# Patient Record
Sex: FEMALE | Race: WHITE | NOT HISPANIC OR LATINO | Employment: OTHER | URBAN - METROPOLITAN AREA
[De-identification: names, ages, dates, MRNs, and addresses within clinical notes are randomized per-mention and may not be internally consistent; named-entity substitution may affect disease eponyms.]

---

## 2017-07-06 ENCOUNTER — TRANSCRIBE ORDERS (OUTPATIENT)
Dept: ADMINISTRATIVE | Facility: HOSPITAL | Age: 62
End: 2017-07-06

## 2017-07-06 DIAGNOSIS — Z12.31 VISIT FOR SCREENING MAMMOGRAM: Primary | ICD-10-CM

## 2017-07-14 ENCOUNTER — APPOINTMENT (OUTPATIENT)
Dept: MAMMOGRAPHY | Facility: HOSPITAL | Age: 62
End: 2017-07-14
Attending: FAMILY MEDICINE

## 2017-10-10 ENCOUNTER — HOSPITAL ENCOUNTER (OUTPATIENT)
Dept: MAMMOGRAPHY | Facility: HOSPITAL | Age: 62
Discharge: HOME | End: 2017-10-10
Attending: FAMILY MEDICINE | Admitting: FAMILY MEDICINE

## 2017-10-10 DIAGNOSIS — Z12.31 VISIT FOR SCREENING MAMMOGRAM: ICD-10-CM

## 2017-10-10 PROCEDURE — G0202 SCR MAMMO BI INCL CAD: HCPCS

## 2017-10-10 PROCEDURE — 77063 BREAST TOMOSYNTHESIS BI: CPT

## 2018-01-22 ENCOUNTER — TRANSCRIBE ORDERS (OUTPATIENT)
Dept: ADMINISTRATIVE | Facility: HOSPITAL | Age: 63
End: 2018-01-22

## 2018-01-22 ENCOUNTER — LAB (OUTPATIENT)
Dept: LAB | Facility: HOSPITAL | Age: 63
End: 2018-01-22

## 2018-01-22 DIAGNOSIS — Z79.899 ENCOUNTER FOR LONG-TERM (CURRENT) USE OF MEDICATIONS: Primary | ICD-10-CM

## 2018-01-22 DIAGNOSIS — L40.0 PSORIASIS VULGARIS: ICD-10-CM

## 2018-01-22 DIAGNOSIS — Z79.899 ENCOUNTER FOR LONG-TERM (CURRENT) USE OF MEDICATIONS: ICD-10-CM

## 2018-01-22 LAB
ALBUMIN SERPL-MCNC: 4.4 G/DL (ref 3.5–5.2)
ALBUMIN/GLOB SERPL: 1.7 G/DL
ALP SERPL-CCNC: 121 U/L (ref 40–129)
ALT SERPL W P-5'-P-CCNC: 19 U/L (ref 5–33)
ANION GAP SERPL CALCULATED.3IONS-SCNC: 14.1 MMOL/L
AST SERPL-CCNC: 17 U/L (ref 5–32)
BASOPHILS # BLD AUTO: 0.07 10*3/MM3 (ref 0–0.2)
BASOPHILS NFR BLD AUTO: 0.8 % (ref 0–2)
BILIRUB SERPL-MCNC: 0.4 MG/DL (ref 0.2–1.2)
BUN BLD-MCNC: 17 MG/DL (ref 8–23)
BUN/CREAT SERPL: 15.2 (ref 7–25)
CALCIUM SPEC-SCNC: 9.4 MG/DL (ref 8.8–10.5)
CHLORIDE SERPL-SCNC: 101 MMOL/L (ref 98–107)
CO2 SERPL-SCNC: 24.9 MMOL/L (ref 22–29)
CREAT BLD-MCNC: 1.12 MG/DL (ref 0.57–1)
DEPRECATED RDW RBC AUTO: 41.6 FL (ref 37–54)
EOSINOPHIL # BLD AUTO: 0.18 10*3/MM3 (ref 0.1–0.3)
EOSINOPHIL NFR BLD AUTO: 2 % (ref 0–4)
ERYTHROCYTE [DISTWIDTH] IN BLOOD BY AUTOMATED COUNT: 13 % (ref 11.5–14.5)
GFR SERPL CREATININE-BSD FRML MDRD: 49 ML/MIN/1.73
GLOBULIN UR ELPH-MCNC: 2.6 GM/DL
GLUCOSE BLD-MCNC: 208 MG/DL (ref 65–99)
HCT VFR BLD AUTO: 38.1 % (ref 37–47)
HGB BLD-MCNC: 12.8 G/DL (ref 12–16)
IMM GRANULOCYTES # BLD: 0.05 10*3/MM3 (ref 0–0.03)
IMM GRANULOCYTES NFR BLD: 0.6 % (ref 0–0.5)
LYMPHOCYTES # BLD AUTO: 2.49 10*3/MM3 (ref 0.6–4.8)
LYMPHOCYTES NFR BLD AUTO: 28.3 % (ref 20–45)
MCH RBC QN AUTO: 29.8 PG (ref 27–31)
MCHC RBC AUTO-ENTMCNC: 33.6 G/DL (ref 31–37)
MCV RBC AUTO: 88.6 FL (ref 81–99)
MONOCYTES # BLD AUTO: 0.7 10*3/MM3 (ref 0–1)
MONOCYTES NFR BLD AUTO: 8 % (ref 3–8)
NEUTROPHILS # BLD AUTO: 5.31 10*3/MM3 (ref 1.5–8.3)
NEUTROPHILS NFR BLD AUTO: 60.3 % (ref 45–70)
NRBC BLD MANUAL-RTO: 0 /100 WBC (ref 0–0)
PLATELET # BLD AUTO: 340 10*3/MM3 (ref 140–500)
PMV BLD AUTO: 10.2 FL (ref 7.4–10.4)
POTASSIUM BLD-SCNC: 4.3 MMOL/L (ref 3.5–5.2)
PROT SERPL-MCNC: 7 G/DL (ref 6–8.5)
RBC # BLD AUTO: 4.3 10*6/MM3 (ref 4.2–5.4)
SODIUM BLD-SCNC: 140 MMOL/L (ref 136–145)
WBC NRBC COR # BLD: 8.8 10*3/MM3 (ref 4.8–10.8)

## 2018-01-22 PROCEDURE — 80074 ACUTE HEPATITIS PANEL: CPT

## 2018-01-22 PROCEDURE — 86480 TB TEST CELL IMMUN MEASURE: CPT

## 2018-01-22 PROCEDURE — 85025 COMPLETE CBC W/AUTO DIFF WBC: CPT

## 2018-01-22 PROCEDURE — 36415 COLL VENOUS BLD VENIPUNCTURE: CPT

## 2018-01-22 PROCEDURE — 80053 COMPREHEN METABOLIC PANEL: CPT

## 2018-01-23 LAB
HAV IGM SERPL QL IA: NORMAL
HBV CORE IGM SERPL QL IA: NORMAL
HBV SURFACE AG SERPL QL IA: NORMAL
HCV AB SER DONR QL: NORMAL

## 2018-01-24 LAB
INTERPRETATION: NORMAL
M TB TUBERC IFN-G BLD QL: NEGATIVE
QFT TB AG MINUS NIL VALUE: <0 IU/ML
QUANTIFERON CRITERIA: NORMAL
QUANTIFERON INCUBATION: NORMAL
QUANTIFERON MITOGEN VALUE: >10 IU/ML
QUANTIFERON NIL VALUE: 0.06 IU/ML
QUANTIFERON TB AG VALUE: 0.04 IU/ML

## 2018-03-05 ENCOUNTER — TRANSCRIBE ORDERS (OUTPATIENT)
Dept: ADMINISTRATIVE | Facility: HOSPITAL | Age: 63
End: 2018-03-05

## 2018-03-05 DIAGNOSIS — K21.9 CHRONIC GERD: Primary | ICD-10-CM

## 2018-04-12 ENCOUNTER — APPOINTMENT (OUTPATIENT)
Dept: GENERAL RADIOLOGY | Facility: HOSPITAL | Age: 63
End: 2018-04-12
Attending: FAMILY MEDICINE

## 2018-07-09 ENCOUNTER — TRANSCRIBE ORDERS (OUTPATIENT)
Dept: ADMINISTRATIVE | Facility: HOSPITAL | Age: 63
End: 2018-07-09

## 2018-07-09 DIAGNOSIS — D86.9 SARCOIDOSIS: Primary | ICD-10-CM

## 2018-07-16 ENCOUNTER — HOSPITAL ENCOUNTER (OUTPATIENT)
Dept: CT IMAGING | Facility: HOSPITAL | Age: 63
Discharge: HOME | End: 2018-07-16
Attending: FAMILY MEDICINE | Admitting: FAMILY MEDICINE

## 2018-07-16 DIAGNOSIS — D86.9 SARCOIDOSIS: ICD-10-CM

## 2018-07-16 PROCEDURE — 71260 CT THORAX DX C+: CPT

## 2018-07-16 PROCEDURE — 0 IOPAMIDOL PER 1 ML: Performed by: FAMILY MEDICINE

## 2018-07-16 RX ADMIN — IOPAMIDOL 100 ML: 755 INJECTION, SOLUTION INTRAVENOUS at 13:30

## 2018-12-26 ENCOUNTER — TRANSCRIBE ORDERS (OUTPATIENT)
Dept: ADMINISTRATIVE | Facility: HOSPITAL | Age: 63
End: 2018-12-26

## 2018-12-26 DIAGNOSIS — Z12.39 SCREENING BREAST EXAMINATION: Primary | ICD-10-CM

## 2018-12-31 ENCOUNTER — LAB (OUTPATIENT)
Dept: LAB | Facility: HOSPITAL | Age: 63
End: 2018-12-31

## 2018-12-31 ENCOUNTER — TRANSCRIBE ORDERS (OUTPATIENT)
Dept: ADMINISTRATIVE | Facility: HOSPITAL | Age: 63
End: 2018-12-31

## 2018-12-31 DIAGNOSIS — Z79.899 HIGH RISK MEDICATION USE: ICD-10-CM

## 2018-12-31 DIAGNOSIS — L40.0 PSORIASIS VULGARIS: Primary | ICD-10-CM

## 2018-12-31 DIAGNOSIS — L40.0 PSORIASIS VULGARIS: ICD-10-CM

## 2018-12-31 LAB
ALBUMIN SERPL-MCNC: 4.6 G/DL (ref 3.5–5.2)
ALBUMIN/GLOB SERPL: 1.4 G/DL
ALP SERPL-CCNC: 128 U/L (ref 40–129)
ALT SERPL W P-5'-P-CCNC: 38 U/L (ref 5–33)
ANION GAP SERPL CALCULATED.3IONS-SCNC: 13.2 MMOL/L
AST SERPL-CCNC: 26 U/L (ref 5–32)
BASOPHILS # BLD AUTO: 0.07 10*3/MM3 (ref 0–0.2)
BASOPHILS NFR BLD AUTO: 0.9 % (ref 0–2)
BILIRUB SERPL-MCNC: 0.5 MG/DL (ref 0.2–1.2)
BUN BLD-MCNC: 28 MG/DL (ref 8–23)
BUN/CREAT SERPL: 24.6 (ref 7–25)
CALCIUM SPEC-SCNC: 10 MG/DL (ref 8.8–10.5)
CHLORIDE SERPL-SCNC: 99 MMOL/L (ref 98–107)
CO2 SERPL-SCNC: 25.8 MMOL/L (ref 22–29)
CREAT BLD-MCNC: 1.14 MG/DL (ref 0.57–1)
DEPRECATED RDW RBC AUTO: 43 FL (ref 37–54)
EOSINOPHIL # BLD AUTO: 0.16 10*3/MM3 (ref 0.1–0.3)
EOSINOPHIL NFR BLD AUTO: 2.2 % (ref 0–4)
ERYTHROCYTE [DISTWIDTH] IN BLOOD BY AUTOMATED COUNT: 13.2 % (ref 11.5–14.5)
GFR SERPL CREATININE-BSD FRML MDRD: 48 ML/MIN/1.73
GLOBULIN UR ELPH-MCNC: 3.4 GM/DL
GLUCOSE BLD-MCNC: 211 MG/DL (ref 65–99)
HCT VFR BLD AUTO: 41 % (ref 37–47)
HGB BLD-MCNC: 13.5 G/DL (ref 12–16)
IMM GRANULOCYTES # BLD AUTO: 0.07 10*3/MM3 (ref 0–0.03)
IMM GRANULOCYTES NFR BLD AUTO: 0.9 % (ref 0–0.5)
LYMPHOCYTES # BLD AUTO: 2.98 10*3/MM3 (ref 0.6–4.8)
LYMPHOCYTES NFR BLD AUTO: 40.2 % (ref 20–45)
MCH RBC QN AUTO: 29.5 PG (ref 27–31)
MCHC RBC AUTO-ENTMCNC: 32.9 G/DL (ref 31–37)
MCV RBC AUTO: 89.7 FL (ref 81–99)
MONOCYTES # BLD AUTO: 0.57 10*3/MM3 (ref 0–1)
MONOCYTES NFR BLD AUTO: 7.7 % (ref 3–8)
NEUTROPHILS # BLD AUTO: 3.57 10*3/MM3 (ref 1.5–8.3)
NEUTROPHILS NFR BLD AUTO: 48.1 % (ref 45–70)
NRBC BLD AUTO-RTO: 0 /100 WBC (ref 0–0)
PLATELET # BLD AUTO: 275 10*3/MM3 (ref 140–500)
PMV BLD AUTO: 10.2 FL (ref 7.4–10.4)
POTASSIUM BLD-SCNC: 4.3 MMOL/L (ref 3.5–5.2)
PROT SERPL-MCNC: 8 G/DL (ref 6–8.5)
RBC # BLD AUTO: 4.57 10*6/MM3 (ref 4.2–5.4)
SODIUM BLD-SCNC: 138 MMOL/L (ref 136–145)
WBC NRBC COR # BLD: 7.42 10*3/MM3 (ref 4.8–10.8)

## 2018-12-31 PROCEDURE — 36415 COLL VENOUS BLD VENIPUNCTURE: CPT

## 2018-12-31 PROCEDURE — 80053 COMPREHEN METABOLIC PANEL: CPT

## 2018-12-31 PROCEDURE — 85025 COMPLETE CBC W/AUTO DIFF WBC: CPT

## 2018-12-31 PROCEDURE — 86480 TB TEST CELL IMMUN MEASURE: CPT

## 2019-01-03 LAB
QUANTIFERON CRITERIA: NORMAL
QUANTIFERON INCUBATION: NORMAL
QUANTIFERON MITOGEN VALUE: >10 IU/ML
QUANTIFERON NIL VALUE: 0.02 IU/ML
QUANTIFERON TB1 AG VALUE: 0.05 IU/ML
QUANTIFERON TB2 AG VALUE: 0.04 IU/ML
QUANTIFERON-TB GOLD PLUS: NEGATIVE

## 2019-01-16 ENCOUNTER — HOSPITAL ENCOUNTER (OUTPATIENT)
Dept: MAMMOGRAPHY | Facility: HOSPITAL | Age: 64
Discharge: HOME | End: 2019-01-16
Attending: FAMILY MEDICINE | Admitting: FAMILY MEDICINE

## 2019-01-16 DIAGNOSIS — Z12.39 SCREENING BREAST EXAMINATION: ICD-10-CM

## 2019-01-16 PROCEDURE — 77067 SCR MAMMO BI INCL CAD: CPT

## 2019-01-16 PROCEDURE — 77063 BREAST TOMOSYNTHESIS BI: CPT

## 2019-04-11 ENCOUNTER — TRANSCRIBE ORDERS (OUTPATIENT)
Dept: ADMINISTRATIVE | Facility: HOSPITAL | Age: 64
End: 2019-04-11

## 2019-04-11 DIAGNOSIS — Z78.0 MENOPAUSE: Primary | ICD-10-CM

## 2019-06-17 ENCOUNTER — LAB (OUTPATIENT)
Dept: LAB | Facility: HOSPITAL | Age: 64
End: 2019-06-17

## 2019-06-17 ENCOUNTER — TRANSCRIBE ORDERS (OUTPATIENT)
Dept: ADMINISTRATIVE | Facility: HOSPITAL | Age: 64
End: 2019-06-17

## 2019-06-17 DIAGNOSIS — Z79.899 ENCOUNTER FOR LONG-TERM (CURRENT) USE OF MEDICATIONS: ICD-10-CM

## 2019-06-17 DIAGNOSIS — L40.0 PSORIASIS VULGARIS: ICD-10-CM

## 2019-06-17 DIAGNOSIS — Z79.899 ENCOUNTER FOR LONG-TERM (CURRENT) USE OF MEDICATIONS: Primary | ICD-10-CM

## 2019-06-17 LAB
ALBUMIN SERPL-MCNC: 4.7 G/DL (ref 3.5–5.2)
ALBUMIN/GLOB SERPL: 1.6 G/DL
ALP SERPL-CCNC: 95 U/L (ref 39–117)
ALT SERPL W P-5'-P-CCNC: 34 U/L (ref 1–33)
ANION GAP SERPL CALCULATED.3IONS-SCNC: 12.2 MMOL/L
AST SERPL-CCNC: 25 U/L (ref 1–32)
BASOPHILS # BLD AUTO: 0.08 10*3/MM3 (ref 0–0.2)
BASOPHILS NFR BLD AUTO: 1 % (ref 0–1.5)
BILIRUB SERPL-MCNC: 0.5 MG/DL (ref 0.2–1.2)
BUN BLD-MCNC: 28 MG/DL (ref 8–23)
BUN/CREAT SERPL: 25.2 (ref 7–25)
CALCIUM SPEC-SCNC: 10.2 MG/DL (ref 8.6–10.5)
CHLORIDE SERPL-SCNC: 97 MMOL/L (ref 98–107)
CO2 SERPL-SCNC: 27.8 MMOL/L (ref 22–29)
CREAT BLD-MCNC: 1.11 MG/DL (ref 0.57–1)
DEPRECATED RDW RBC AUTO: 43.2 FL (ref 37–54)
EOSINOPHIL # BLD AUTO: 0.14 10*3/MM3 (ref 0–0.4)
EOSINOPHIL NFR BLD AUTO: 1.7 % (ref 0.3–6.2)
ERYTHROCYTE [DISTWIDTH] IN BLOOD BY AUTOMATED COUNT: 13.2 % (ref 12.3–15.4)
GFR SERPL CREATININE-BSD FRML MDRD: 49 ML/MIN/1.73
GLOBULIN UR ELPH-MCNC: 2.9 GM/DL
GLUCOSE BLD-MCNC: 105 MG/DL (ref 65–99)
HCT VFR BLD AUTO: 40.2 % (ref 34–46.6)
HGB BLD-MCNC: 13.2 G/DL (ref 12–15.9)
IMM GRANULOCYTES # BLD AUTO: 0.07 10*3/MM3 (ref 0–0.05)
IMM GRANULOCYTES NFR BLD AUTO: 0.8 % (ref 0–0.5)
LYMPHOCYTES # BLD AUTO: 3.38 10*3/MM3 (ref 0.7–3.1)
LYMPHOCYTES NFR BLD AUTO: 40.4 % (ref 19.6–45.3)
MCH RBC QN AUTO: 29.9 PG (ref 26.6–33)
MCHC RBC AUTO-ENTMCNC: 32.8 G/DL (ref 31.5–35.7)
MCV RBC AUTO: 91.2 FL (ref 79–97)
MONOCYTES # BLD AUTO: 0.61 10*3/MM3 (ref 0.1–0.9)
MONOCYTES NFR BLD AUTO: 7.3 % (ref 5–12)
NEUTROPHILS # BLD AUTO: 4.08 10*3/MM3 (ref 1.7–7)
NEUTROPHILS NFR BLD AUTO: 48.8 % (ref 42.7–76)
NRBC BLD AUTO-RTO: 0 /100 WBC (ref 0–0.2)
PLATELET # BLD AUTO: 277 10*3/MM3 (ref 140–450)
PMV BLD AUTO: 11.2 FL (ref 6–12)
POTASSIUM BLD-SCNC: 3.9 MMOL/L (ref 3.5–5.2)
PROT SERPL-MCNC: 7.6 G/DL (ref 6–8.5)
RBC # BLD AUTO: 4.41 10*6/MM3 (ref 3.77–5.28)
SODIUM BLD-SCNC: 137 MMOL/L (ref 136–145)
WBC NRBC COR # BLD: 8.36 10*3/MM3 (ref 3.4–10.8)

## 2019-06-17 PROCEDURE — 36415 COLL VENOUS BLD VENIPUNCTURE: CPT

## 2019-06-17 PROCEDURE — 80053 COMPREHEN METABOLIC PANEL: CPT

## 2019-06-17 PROCEDURE — 85025 COMPLETE CBC W/AUTO DIFF WBC: CPT

## 2019-07-24 ENCOUNTER — APPOINTMENT (OUTPATIENT)
Dept: BONE DENSITY | Facility: HOSPITAL | Age: 64
End: 2019-07-24

## 2019-07-24 DIAGNOSIS — Z78.0 MENOPAUSE: ICD-10-CM

## 2019-07-24 PROCEDURE — 77080 DXA BONE DENSITY AXIAL: CPT

## 2019-12-17 ENCOUNTER — LAB (OUTPATIENT)
Dept: LAB | Facility: HOSPITAL | Age: 64
End: 2019-12-17

## 2019-12-17 ENCOUNTER — TRANSCRIBE ORDERS (OUTPATIENT)
Dept: ADMINISTRATIVE | Facility: HOSPITAL | Age: 64
End: 2019-12-17

## 2019-12-17 DIAGNOSIS — Z79.899 LONG TERM USE OF DRUG: ICD-10-CM

## 2019-12-17 DIAGNOSIS — L40.0 PSORIASIS VULGARIS: ICD-10-CM

## 2019-12-17 DIAGNOSIS — L40.0 PSORIASIS VULGARIS: Primary | ICD-10-CM

## 2019-12-17 LAB
ALBUMIN SERPL-MCNC: 4.3 G/DL (ref 3.5–5.2)
ALBUMIN/GLOB SERPL: 1.1 G/DL
ALP SERPL-CCNC: 102 U/L (ref 39–117)
ALT SERPL W P-5'-P-CCNC: 34 U/L (ref 1–33)
ANION GAP SERPL CALCULATED.3IONS-SCNC: 13.6 MMOL/L (ref 5–15)
AST SERPL-CCNC: 29 U/L (ref 1–32)
BASOPHILS # BLD AUTO: 0.09 10*3/MM3 (ref 0–0.2)
BASOPHILS NFR BLD AUTO: 1 % (ref 0–1.5)
BILIRUB SERPL-MCNC: 0.5 MG/DL (ref 0.2–1.2)
BUN BLD-MCNC: 33 MG/DL (ref 8–23)
BUN/CREAT SERPL: 26.6 (ref 7–25)
CALCIUM SPEC-SCNC: 10.2 MG/DL (ref 8.6–10.5)
CHLORIDE SERPL-SCNC: 96 MMOL/L (ref 98–107)
CO2 SERPL-SCNC: 25.4 MMOL/L (ref 22–29)
CREAT BLD-MCNC: 1.24 MG/DL (ref 0.57–1)
DEPRECATED RDW RBC AUTO: 42.8 FL (ref 37–54)
EOSINOPHIL # BLD AUTO: 0.15 10*3/MM3 (ref 0–0.4)
EOSINOPHIL NFR BLD AUTO: 1.6 % (ref 0.3–6.2)
ERYTHROCYTE [DISTWIDTH] IN BLOOD BY AUTOMATED COUNT: 13 % (ref 12.3–15.4)
GFR SERPL CREATININE-BSD FRML MDRD: 44 ML/MIN/1.73
GLOBULIN UR ELPH-MCNC: 3.9 GM/DL
GLUCOSE BLD-MCNC: 206 MG/DL (ref 65–99)
HCT VFR BLD AUTO: 38.6 % (ref 34–46.6)
HGB BLD-MCNC: 12.8 G/DL (ref 12–15.9)
IMM GRANULOCYTES # BLD AUTO: 0.1 10*3/MM3 (ref 0–0.05)
IMM GRANULOCYTES NFR BLD AUTO: 1.1 % (ref 0–0.5)
LYMPHOCYTES # BLD AUTO: 3.61 10*3/MM3 (ref 0.7–3.1)
LYMPHOCYTES NFR BLD AUTO: 39.5 % (ref 19.6–45.3)
MCH RBC QN AUTO: 29.7 PG (ref 26.6–33)
MCHC RBC AUTO-ENTMCNC: 33.2 G/DL (ref 31.5–35.7)
MCV RBC AUTO: 89.6 FL (ref 79–97)
MONOCYTES # BLD AUTO: 0.76 10*3/MM3 (ref 0.1–0.9)
MONOCYTES NFR BLD AUTO: 8.3 % (ref 5–12)
NEUTROPHILS # BLD AUTO: 4.44 10*3/MM3 (ref 1.7–7)
NEUTROPHILS NFR BLD AUTO: 48.5 % (ref 42.7–76)
NRBC BLD AUTO-RTO: 0 /100 WBC (ref 0–0.2)
PLATELET # BLD AUTO: 296 10*3/MM3 (ref 140–450)
PMV BLD AUTO: 10.7 FL (ref 6–12)
POTASSIUM BLD-SCNC: 4.1 MMOL/L (ref 3.5–5.2)
PROT SERPL-MCNC: 8.2 G/DL (ref 6–8.5)
RBC # BLD AUTO: 4.31 10*6/MM3 (ref 3.77–5.28)
SODIUM BLD-SCNC: 135 MMOL/L (ref 136–145)
WBC NRBC COR # BLD: 9.15 10*3/MM3 (ref 3.4–10.8)

## 2019-12-17 PROCEDURE — 86480 TB TEST CELL IMMUN MEASURE: CPT

## 2019-12-17 PROCEDURE — 85025 COMPLETE CBC W/AUTO DIFF WBC: CPT

## 2019-12-17 PROCEDURE — 36415 COLL VENOUS BLD VENIPUNCTURE: CPT

## 2019-12-17 PROCEDURE — 80053 COMPREHEN METABOLIC PANEL: CPT

## 2019-12-20 LAB
QUANTIFERON CRITERIA: NORMAL
QUANTIFERON INCUBATION: NORMAL
QUANTIFERON MITOGEN VALUE: >10 IU/ML
QUANTIFERON NIL VALUE: 0.04 IU/ML
QUANTIFERON TB1 AG VALUE: 0.08 IU/ML
QUANTIFERON TB2 AG VALUE: 0.05 IU/ML
QUANTIFERON-TB GOLD PLUS: NEGATIVE

## 2020-07-06 ENCOUNTER — TRANSCRIBE ORDERS (OUTPATIENT)
Dept: ADMINISTRATIVE | Facility: HOSPITAL | Age: 65
End: 2020-07-06

## 2020-07-06 DIAGNOSIS — N18.30 CHRONIC KIDNEY DISEASE, STAGE III (MODERATE) (HCC): Primary | ICD-10-CM

## 2020-07-16 ENCOUNTER — HOSPITAL ENCOUNTER (OUTPATIENT)
Dept: ULTRASOUND IMAGING | Facility: HOSPITAL | Age: 65
Discharge: HOME | End: 2020-07-16
Admitting: FAMILY MEDICINE

## 2020-07-16 DIAGNOSIS — N18.30 CHRONIC KIDNEY DISEASE, STAGE III (MODERATE) (HCC): ICD-10-CM

## 2020-07-16 PROCEDURE — 76775 US EXAM ABDO BACK WALL LIM: CPT

## 2021-02-03 ENCOUNTER — APPOINTMENT (OUTPATIENT)
Dept: ULTRASOUND IMAGING | Facility: HOSPITAL | Age: 66
End: 2021-02-03

## 2021-02-03 ENCOUNTER — APPOINTMENT (OUTPATIENT)
Dept: GENERAL RADIOLOGY | Facility: HOSPITAL | Age: 66
End: 2021-02-03

## 2021-02-03 ENCOUNTER — HOSPITAL ENCOUNTER (INPATIENT)
Facility: HOSPITAL | Age: 66
LOS: 8 days | Discharge: HOME | End: 2021-02-11
Attending: FAMILY MEDICINE | Admitting: FAMILY MEDICINE

## 2021-02-03 ENCOUNTER — HOSPITAL ENCOUNTER (OUTPATIENT)
Dept: INFUSION THERAPY | Facility: HOSPITAL | Age: 66
Discharge: HOME | End: 2021-02-03

## 2021-02-03 ENCOUNTER — APPOINTMENT (OUTPATIENT)
Dept: CT IMAGING | Facility: HOSPITAL | Age: 66
End: 2021-02-03

## 2021-02-03 ENCOUNTER — PREP FOR SURGERY (OUTPATIENT)
Dept: OTHER | Facility: HOSPITAL | Age: 66
End: 2021-02-03

## 2021-02-03 VITALS
HEART RATE: 68 BPM | TEMPERATURE: 97.8 F | SYSTOLIC BLOOD PRESSURE: 94 MMHG | WEIGHT: 180 LBS | HEIGHT: 60 IN | OXYGEN SATURATION: 96 % | DIASTOLIC BLOOD PRESSURE: 62 MMHG | RESPIRATION RATE: 16 BRPM | BODY MASS INDEX: 35.34 KG/M2

## 2021-02-03 DIAGNOSIS — K81.0 ACUTE GANGRENOUS CHOLECYSTITIS: Primary | ICD-10-CM

## 2021-02-03 DIAGNOSIS — A08.4 VIRAL ENTERITIS: ICD-10-CM

## 2021-02-03 DIAGNOSIS — N17.9 ACUTE RENAL FAILURE, UNSPECIFIED ACUTE RENAL FAILURE TYPE (HCC): Primary | ICD-10-CM

## 2021-02-03 PROBLEM — K52.9 GE (GASTROENTERITIS): Status: ACTIVE | Noted: 2021-02-03

## 2021-02-03 LAB
ADV 40+41 DNA STL QL NAA+NON-PROBE: NOT DETECTED
ALBUMIN SERPL-MCNC: 3.6 G/DL (ref 3.5–5.2)
ALBUMIN/GLOB SERPL: 0.7 G/DL
ALP SERPL-CCNC: 487 U/L (ref 39–117)
ALT SERPL W P-5'-P-CCNC: 43 U/L (ref 1–33)
ANION GAP SERPL CALCULATED.3IONS-SCNC: 12.5 MMOL/L (ref 5–15)
AST SERPL-CCNC: 39 U/L (ref 1–32)
ASTRO TYP 1-8 RNA STL QL NAA+NON-PROBE: NOT DETECTED
BASOPHILS # BLD AUTO: 0.16 10*3/MM3 (ref 0–0.2)
BASOPHILS NFR BLD AUTO: 0.9 % (ref 0–1.5)
BILIRUB SERPL-MCNC: 0.6 MG/DL (ref 0–1.2)
BUN SERPL-MCNC: 29 MG/DL (ref 8–23)
BUN/CREAT SERPL: 20.7 (ref 7–25)
C CAYETANENSIS DNA STL QL NAA+NON-PROBE: NOT DETECTED
CALCIUM SPEC-SCNC: 10 MG/DL (ref 8.6–10.5)
CAMPY SP DNA.DIARRHEA STL QL NAA+PROBE: NOT DETECTED
CHLORIDE SERPL-SCNC: 93 MMOL/L (ref 98–107)
CO2 SERPL-SCNC: 29.5 MMOL/L (ref 22–29)
CREAT SERPL-MCNC: 1.4 MG/DL (ref 0.57–1)
CRYPTOSP STL CULT: NOT DETECTED
D-LACTATE SERPL-SCNC: 1 MMOL/L (ref 0.5–2)
DEPRECATED RDW RBC AUTO: 44.2 FL (ref 37–54)
E HISTOLYT AG STL-ACNC: NOT DETECTED
EAEC PAA PLAS AGGR+AATA ST NAA+NON-PRB: NOT DETECTED
EC STX1 + STX2 GENES STL NAA+PROBE: NOT DETECTED
EOSINOPHIL # BLD AUTO: 0.05 10*3/MM3 (ref 0–0.4)
EOSINOPHIL NFR BLD AUTO: 0.3 % (ref 0.3–6.2)
EPEC EAE GENE STL QL NAA+NON-PROBE: NOT DETECTED
ERYTHROCYTE [DISTWIDTH] IN BLOOD BY AUTOMATED COUNT: 13.1 % (ref 12.3–15.4)
ETEC LTA+ST1A+ST1B TOX ST NAA+NON-PROBE: NOT DETECTED
G LAMBLIA DNA SPEC QL NAA+PROBE: NOT DETECTED
GFR SERPL CREATININE-BSD FRML MDRD: 38 ML/MIN/1.73
GLOBULIN UR ELPH-MCNC: 5 GM/DL
GLUCOSE BLDC GLUCOMTR-MCNC: 150 MG/DL (ref 70–130)
GLUCOSE SERPL-MCNC: 158 MG/DL (ref 65–99)
HCT VFR BLD AUTO: 36.4 % (ref 34–46.6)
HGB BLD-MCNC: 11.7 G/DL (ref 12–15.9)
IMM GRANULOCYTES # BLD AUTO: 0.97 10*3/MM3 (ref 0–0.05)
IMM GRANULOCYTES NFR BLD AUTO: 5.5 % (ref 0–0.5)
LYMPHOCYTES # BLD AUTO: 2.54 10*3/MM3 (ref 0.7–3.1)
LYMPHOCYTES NFR BLD AUTO: 14.3 % (ref 19.6–45.3)
MCH RBC QN AUTO: 29.4 PG (ref 26.6–33)
MCHC RBC AUTO-ENTMCNC: 32.1 G/DL (ref 31.5–35.7)
MCV RBC AUTO: 91.5 FL (ref 79–97)
MONOCYTES # BLD AUTO: 1.24 10*3/MM3 (ref 0.1–0.9)
MONOCYTES NFR BLD AUTO: 7 % (ref 5–12)
NEUTROPHILS NFR BLD AUTO: 12.78 10*3/MM3 (ref 1.7–7)
NEUTROPHILS NFR BLD AUTO: 72 % (ref 42.7–76)
NOROVIRUS GI+II RNA STL QL NAA+NON-PROBE: NOT DETECTED
NRBC BLD AUTO-RTO: 0 /100 WBC (ref 0–0.2)
P SHIGELLOIDES DNA STL QL NAA+PROBE: NOT DETECTED
PLAT MORPH BLD: NORMAL
PLATELET # BLD AUTO: 519 10*3/MM3 (ref 140–450)
PMV BLD AUTO: 9.3 FL (ref 6–12)
POTASSIUM SERPL-SCNC: 3.6 MMOL/L (ref 3.5–5.2)
PROT SERPL-MCNC: 8.6 G/DL (ref 6–8.5)
RBC # BLD AUTO: 3.98 10*6/MM3 (ref 3.77–5.28)
RBC MORPH BLD: NORMAL
RV RNA STL NAA+PROBE: NOT DETECTED
SALMONELLA DNA SPEC QL NAA+PROBE: NOT DETECTED
SAPO I+II+IV+V RNA STL QL NAA+NON-PROBE: NOT DETECTED
SARS-COV-2 RNA PNL SPEC NAA+PROBE: NOT DETECTED
SHIGELLA SP+EIEC IPAH STL QL NAA+PROBE: NOT DETECTED
SODIUM SERPL-SCNC: 135 MMOL/L (ref 136–145)
V CHOLERAE DNA SPEC QL NAA+PROBE: NOT DETECTED
VIBRIO DNA SPEC NAA+PROBE: NOT DETECTED
WBC # BLD AUTO: 17.74 10*3/MM3 (ref 3.4–10.8)
WBC MORPH BLD: NORMAL
YERSINIA STL CULT: NOT DETECTED

## 2021-02-03 PROCEDURE — 76705 ECHO EXAM OF ABDOMEN: CPT

## 2021-02-03 PROCEDURE — 63710000001 INSULIN ASPART PER 5 UNITS: Performed by: FAMILY MEDICINE

## 2021-02-03 PROCEDURE — 36415 COLL VENOUS BLD VENIPUNCTURE: CPT

## 2021-02-03 PROCEDURE — 85007 BL SMEAR W/DIFF WBC COUNT: CPT | Performed by: FAMILY MEDICINE

## 2021-02-03 PROCEDURE — 25010000002 CEFTRIAXONE SODIUM-DEXTROSE 1-3.74 GM-%(50ML) RECONSTITUTED SOLUTION: Performed by: FAMILY MEDICINE

## 2021-02-03 PROCEDURE — 96361 HYDRATE IV INFUSION ADD-ON: CPT

## 2021-02-03 PROCEDURE — 83605 ASSAY OF LACTIC ACID: CPT | Performed by: FAMILY MEDICINE

## 2021-02-03 PROCEDURE — 85025 COMPLETE CBC W/AUTO DIFF WBC: CPT | Performed by: FAMILY MEDICINE

## 2021-02-03 PROCEDURE — 71046 X-RAY EXAM CHEST 2 VIEWS: CPT

## 2021-02-03 PROCEDURE — 0 DIATRIZOATE MEGLUMINE & SODIUM PER 1 ML: Performed by: FAMILY MEDICINE

## 2021-02-03 PROCEDURE — 82962 GLUCOSE BLOOD TEST: CPT

## 2021-02-03 PROCEDURE — 74176 CT ABD & PELVIS W/O CONTRAST: CPT

## 2021-02-03 PROCEDURE — 96360 HYDRATION IV INFUSION INIT: CPT

## 2021-02-03 PROCEDURE — 87635 SARS-COV-2 COVID-19 AMP PRB: CPT | Performed by: FAMILY MEDICINE

## 2021-02-03 PROCEDURE — 0097U HC BIOFIRE FILMARRAY GI PANEL: CPT | Performed by: FAMILY MEDICINE

## 2021-02-03 PROCEDURE — 80053 COMPREHEN METABOLIC PANEL: CPT | Performed by: FAMILY MEDICINE

## 2021-02-03 RX ORDER — ACETAMINOPHEN 325 MG/1
650 TABLET ORAL EVERY 4 HOURS PRN
Status: DISCONTINUED | OUTPATIENT
Start: 2021-02-03 | End: 2021-02-04

## 2021-02-03 RX ORDER — ACETAMINOPHEN 650 MG/1
650 SUPPOSITORY RECTAL EVERY 4 HOURS PRN
Status: DISCONTINUED | OUTPATIENT
Start: 2021-02-03 | End: 2021-02-04

## 2021-02-03 RX ORDER — ACETAMINOPHEN 325 MG/1
650 TABLET ORAL EVERY 4 HOURS PRN
Status: CANCELLED | OUTPATIENT
Start: 2021-02-03

## 2021-02-03 RX ORDER — SODIUM CHLORIDE 0.9 % (FLUSH) 0.9 %
10 SYRINGE (ML) INJECTION AS NEEDED
Status: CANCELLED | OUTPATIENT
Start: 2021-02-03

## 2021-02-03 RX ORDER — SODIUM CHLORIDE 9 MG/ML
150 INJECTION, SOLUTION INTRAVENOUS ONCE
Status: COMPLETED | OUTPATIENT
Start: 2021-02-03 | End: 2021-02-03

## 2021-02-03 RX ORDER — SODIUM CHLORIDE 0.9 % (FLUSH) 0.9 %
10 SYRINGE (ML) INJECTION EVERY 12 HOURS SCHEDULED
Status: DISCONTINUED | OUTPATIENT
Start: 2021-02-03 | End: 2021-02-11 | Stop reason: HOSPADM

## 2021-02-03 RX ORDER — SODIUM CHLORIDE 0.9 % (FLUSH) 0.9 %
1-10 SYRINGE (ML) INJECTION AS NEEDED
Status: CANCELLED | OUTPATIENT
Start: 2021-02-03

## 2021-02-03 RX ORDER — SODIUM CHLORIDE, SODIUM LACTATE, POTASSIUM CHLORIDE, CALCIUM CHLORIDE 600; 310; 30; 20 MG/100ML; MG/100ML; MG/100ML; MG/100ML
100 INJECTION, SOLUTION INTRAVENOUS CONTINUOUS
Status: CANCELLED | OUTPATIENT
Start: 2021-02-03

## 2021-02-03 RX ORDER — ONDANSETRON 4 MG/1
4 TABLET, FILM COATED ORAL EVERY 6 HOURS PRN
Status: CANCELLED | OUTPATIENT
Start: 2021-02-03

## 2021-02-03 RX ORDER — DEXTROSE MONOHYDRATE 25 G/50ML
25 INJECTION, SOLUTION INTRAVENOUS
Status: DISCONTINUED | OUTPATIENT
Start: 2021-02-03 | End: 2021-02-11 | Stop reason: HOSPADM

## 2021-02-03 RX ORDER — PREGABALIN 75 MG/1
75 CAPSULE ORAL EVERY 12 HOURS SCHEDULED
Status: DISCONTINUED | OUTPATIENT
Start: 2021-02-03 | End: 2021-02-11 | Stop reason: HOSPADM

## 2021-02-03 RX ORDER — SODIUM CHLORIDE 0.9 % (FLUSH) 0.9 %
10 SYRINGE (ML) INJECTION EVERY 12 HOURS SCHEDULED
Status: CANCELLED | OUTPATIENT
Start: 2021-02-03

## 2021-02-03 RX ORDER — ONDANSETRON 2 MG/ML
4 INJECTION INTRAMUSCULAR; INTRAVENOUS EVERY 6 HOURS PRN
Status: CANCELLED | OUTPATIENT
Start: 2021-02-03

## 2021-02-03 RX ORDER — CEFTRIAXONE 1 G/50ML
1 INJECTION, SOLUTION INTRAVENOUS EVERY 24 HOURS
Status: DISCONTINUED | OUTPATIENT
Start: 2021-02-03 | End: 2021-02-09

## 2021-02-03 RX ORDER — PANTOPRAZOLE SODIUM 40 MG/1
40 TABLET, DELAYED RELEASE ORAL EVERY MORNING
Status: DISCONTINUED | OUTPATIENT
Start: 2021-02-04 | End: 2021-02-11 | Stop reason: HOSPADM

## 2021-02-03 RX ORDER — ACETAMINOPHEN 160 MG/5ML
650 SOLUTION ORAL EVERY 4 HOURS PRN
Status: DISCONTINUED | OUTPATIENT
Start: 2021-02-03 | End: 2021-02-04

## 2021-02-03 RX ORDER — ACETAMINOPHEN 650 MG/1
650 SUPPOSITORY RECTAL EVERY 4 HOURS PRN
Status: CANCELLED | OUTPATIENT
Start: 2021-02-03

## 2021-02-03 RX ORDER — ALLOPURINOL 100 MG/1
100 TABLET ORAL 2 TIMES DAILY
Status: DISCONTINUED | OUTPATIENT
Start: 2021-02-03 | End: 2021-02-11 | Stop reason: HOSPADM

## 2021-02-03 RX ORDER — ACETAMINOPHEN 160 MG/5ML
650 SOLUTION ORAL EVERY 4 HOURS PRN
Status: CANCELLED | OUTPATIENT
Start: 2021-02-03

## 2021-02-03 RX ORDER — LISINOPRIL 2.5 MG/1
2.5 TABLET ORAL DAILY
COMMUNITY

## 2021-02-03 RX ORDER — ONDANSETRON 2 MG/ML
4 INJECTION INTRAMUSCULAR; INTRAVENOUS EVERY 6 HOURS PRN
Status: DISCONTINUED | OUTPATIENT
Start: 2021-02-03 | End: 2021-02-04

## 2021-02-03 RX ORDER — NICOTINE POLACRILEX 4 MG
15 LOZENGE BUCCAL
Status: DISCONTINUED | OUTPATIENT
Start: 2021-02-03 | End: 2021-02-11 | Stop reason: HOSPADM

## 2021-02-03 RX ORDER — SODIUM CHLORIDE, SODIUM LACTATE, POTASSIUM CHLORIDE, CALCIUM CHLORIDE 600; 310; 30; 20 MG/100ML; MG/100ML; MG/100ML; MG/100ML
100 INJECTION, SOLUTION INTRAVENOUS CONTINUOUS
Status: DISCONTINUED | OUTPATIENT
Start: 2021-02-03 | End: 2021-02-04

## 2021-02-03 RX ORDER — SODIUM CHLORIDE 0.9 % (FLUSH) 0.9 %
10 SYRINGE (ML) INJECTION AS NEEDED
Status: DISCONTINUED | OUTPATIENT
Start: 2021-02-03 | End: 2021-02-11 | Stop reason: HOSPADM

## 2021-02-03 RX ORDER — ATORVASTATIN CALCIUM 20 MG/1
20 TABLET, FILM COATED ORAL DAILY
Status: DISCONTINUED | OUTPATIENT
Start: 2021-02-03 | End: 2021-02-09

## 2021-02-03 RX ORDER — SODIUM CHLORIDE 9 MG/ML
40 INJECTION, SOLUTION INTRAVENOUS AS NEEDED
Status: DISCONTINUED | OUTPATIENT
Start: 2021-02-03 | End: 2021-02-11 | Stop reason: HOSPADM

## 2021-02-03 RX ORDER — PREGABALIN 75 MG/1
75 CAPSULE ORAL
COMMUNITY

## 2021-02-03 RX ORDER — SODIUM CHLORIDE 0.9 % (FLUSH) 0.9 %
1-10 SYRINGE (ML) INJECTION AS NEEDED
Status: DISCONTINUED | OUTPATIENT
Start: 2021-02-03 | End: 2021-02-11 | Stop reason: HOSPADM

## 2021-02-03 RX ORDER — PREDNISONE 1 MG/1
1 TABLET ORAL DAILY
Status: DISCONTINUED | OUTPATIENT
Start: 2021-02-03 | End: 2021-02-11 | Stop reason: HOSPADM

## 2021-02-03 RX ORDER — ONDANSETRON 4 MG/1
4 TABLET, FILM COATED ORAL EVERY 6 HOURS PRN
Status: DISCONTINUED | OUTPATIENT
Start: 2021-02-03 | End: 2021-02-04

## 2021-02-03 RX ORDER — SODIUM CHLORIDE 9 MG/ML
40 INJECTION, SOLUTION INTRAVENOUS AS NEEDED
Status: CANCELLED | OUTPATIENT
Start: 2021-02-03

## 2021-02-03 RX ADMIN — PREGABALIN 75 MG: 75 CAPSULE ORAL at 21:43

## 2021-02-03 RX ADMIN — SODIUM CHLORIDE, POTASSIUM CHLORIDE, SODIUM LACTATE AND CALCIUM CHLORIDE 100 ML/HR: 600; 310; 30; 20 INJECTION, SOLUTION INTRAVENOUS at 18:41

## 2021-02-03 RX ADMIN — ACETAMINOPHEN 650 MG: 325 TABLET, FILM COATED ORAL at 21:43

## 2021-02-03 RX ADMIN — INSULIN ASPART 2 UNITS: 100 INJECTION, SOLUTION INTRAVENOUS; SUBCUTANEOUS at 17:33

## 2021-02-03 RX ADMIN — SODIUM CHLORIDE, POTASSIUM CHLORIDE, SODIUM LACTATE AND CALCIUM CHLORIDE 100 ML/HR: 600; 310; 30; 20 INJECTION, SOLUTION INTRAVENOUS at 13:51

## 2021-02-03 RX ADMIN — CEFTRIAXONE 1 G: 1 INJECTION, SOLUTION INTRAVENOUS at 17:31

## 2021-02-03 RX ADMIN — ATORVASTATIN CALCIUM 20 MG: 20 TABLET, FILM COATED ORAL at 17:33

## 2021-02-03 RX ADMIN — DIATRIZOATE MEGLUMINE AND DIATRIZOATE SODIUM 30 ML: 600; 100 SOLUTION ORAL; RECTAL at 14:00

## 2021-02-03 RX ADMIN — SODIUM CHLORIDE 200 ML/HR: 9 INJECTION, SOLUTION INTRAVENOUS at 10:55

## 2021-02-03 NOTE — H&P
HISTORY AND PHYSICAL      Patient Care Team:  Hazel Fan MD as PCP - General  Hazel Fan MD as PCP - Family Medicine    CHIEF COMPLAINT: Diarrhea weakness     HISTORY OF PRESENT ILLNESS:    66-year-old white female with history of diabetes hypertension hyperlipidemia chronic kidney disease who became ill a week ago with nausea 1 episode of vomiting and fatigue.  She had several episodes of diarrhea was seen in urgent care diagnosed with a viral infection was Covid negative.  Patient continued feeling extremely fatigued and worn out  throughout the week with intermittent episodes of diarrhea was seen in my office on 2/1/2021.  Exam was unremarkable but labs done in the office showed worsening renal functions with creatinine 1.6 baseline being between 1.2-1.3 and BUN elevated to 50 elevated liver enzymes are marked elevated alk phos..  She denies any abdominal pain vomiting.  Initial plan for with the patient to get some IV fluids and be discharged home but her white count came back 17,000 and CT of the abdomen and ultrasound gallbladder showed possible gangrenous cholecystitis.  Patient is being admitted for further evaluation and treatment.  She has no abdominal symptoms consistent with chronic cholecystits or cholelithiasis.    Past Medical History:   Diagnosis Date   • Arthritis    • Diabetes mellitus (CMS/HCC)    • Gout    • Hyperlipidemia    • Hypertension      Past Surgical History:   Procedure Laterality Date   • HERNIA REPAIR     • HYSTERECTOMY       Family History   Problem Relation Age of Onset   • Breast cancer Neg Hx      Social History     Tobacco Use   • Smoking status: Never Smoker   • Smokeless tobacco: Never Used   Substance Use Topics   • Alcohol use: Never     Frequency: Never   • Drug use: Never     Medications Prior to Admission   Medication Sig Dispense Refill Last Dose   • allopurinol (ZYLOPRIM) 100 MG tablet Take 100 mg by mouth 2 (Two) Times a Day.    2/2/2021 at Unknown time   • atenolol-chlorthalidone (TENORETIC) 50-25 MG per tablet Take 1 tablet by mouth Daily.   2/2/2021 at Unknown time   • atorvastatin (LIPITOR) 20 MG tablet Take  by mouth Daily.   2/2/2021 at Unknown time   • glipizide (GLUCOTROL XL) 10 MG 24 hr tablet Take 10 mg by mouth Daily.   2/2/2021 at Unknown time   • lisinopril (PRINIVIL,ZESTRIL) 2.5 MG tablet Take 2.5 mg by mouth Daily.   2/2/2021 at Unknown time   • omeprazole (priLOSEC) 20 MG capsule Take 20 mg by mouth Daily.   2/2/2021 at Unknown time   • predniSONE (DELTASONE) 1 MG tablet Take  by mouth Daily.   2/2/2021 at Unknown time   • pregabalin (LYRICA) 75 MG capsule Take 75 mg by mouth every night at bedtime.   2/2/2021 at Unknown time   • Tresiba FlexTouch 200 UNIT/ML solution pen-injector pen injection INJECT 25 UNITS UNDER SKIN EVERY DAY.   2/2/2021 at Unknown time   • clobetasol (TEMOVATE) 0.05 % cream APPLY THIN LAYER TO SCALP AND RIGHT FOREARM TWICE DAILY AS NEEDED   Unknown at Unknown time     Allergies:  Patient has no known allergies.     Review of Systems   Constitutional: Positive for fatigue. Negative for activity change and appetite change.   HENT: Negative for congestion.    Respiratory: Negative for cough, chest tightness, shortness of breath and wheezing.    Cardiovascular: Negative for chest pain.   Gastrointestinal: Positive for diarrhea. Negative for abdominal distention, abdominal pain, nausea and vomiting.   Endocrine: Negative for polyphagia and polyuria.   Genitourinary: Negative for frequency.   Skin: Negative for rash.   Neurological: Positive for weakness (Generalized). Negative for light-headedness.   Hematological: Does not bruise/bleed easily.   Psychiatric/Behavioral: Negative for agitation and behavioral problems.       Vital Signs  Temp:  [97.8 °F (36.6 °C)-97.9 °F (36.6 °C)] 97.9 °F (36.6 °C)  Heart Rate:  [68-72] 72  Resp:  [16-18] 18  BP: ()/(62-63) 125/63  Oxygen Therapy  SpO2: 100 %  Device  "(Oxygen Therapy): room air}  Body mass index is 34.76 kg/m².  Flowsheet Rows      First Filed Value   Admission Height  152.4 cm (60\") Documented at 02/03/2021 1330   Admission Weight  80.7 kg (178 lb) Documented at 02/03/2021 1330                 Physical Exam  Vitals signs and nursing note reviewed.   Constitutional:       Appearance: She is well-developed. She is obese.   HENT:      Head: Normocephalic.   Eyes:      Conjunctiva/sclera: Conjunctivae normal.   Neck:      Musculoskeletal: Normal range of motion.      Thyroid: No thyromegaly.      Vascular: No JVD.   Cardiovascular:      Rate and Rhythm: Normal rate and regular rhythm.      Heart sounds: Normal heart sounds. No murmur.   Pulmonary:      Effort: Pulmonary effort is normal. No respiratory distress.      Breath sounds: Normal breath sounds. No wheezing or rales.   Abdominal:      General: Bowel sounds are normal. There is no distension.      Palpations: Abdomen is soft.      Tenderness: There is no abdominal tenderness. There is no guarding.   Skin:     General: Skin is warm and dry.      Findings: No rash.   Neurological:      Mental Status: She is alert.          Debilities/Disabilities Identified: None    Emotional Behavior: Appropriate    Results Review:    I reviewed the patient's new clinical results.  Lab Results (most recent)     None          Imaging Results (Most Recent)     Procedure Component Value Units Date/Time    XR Chest 2 View [[id number]] Resulted: 02/03/21 1503     Updated: 02/03/21 1503        reviewed chest x-ray from previous admission    ECG/EMG Results (most recent)     None        Not reviewed    Assessment/Plan       1.  Acute possible gangrenous cholecystitis patient was kept n.p.o. surgical consult be obtained IV fluids IV antibiotics    2.  Intermittent episodes of diarrhea likely viral etiology GI panel C. difficile been ordered    3.  Adult-onset diabetes mellitus insulin-dependent controlled continue with Accu-Chek " sliding cell insulin    4.  Hypertension nothing acute home medication will be continued      5.  Chronic kidney stage III recent mild elevation creatinine back to baseline at this time will monitor    6.  History of pulmonary sarcoidosis in remission continue with low-dose steroids      7.  Hyperuricemia nothing acute resume allopurinol once taking p.o.    8.  DVT prophylaxis SCD      I discussed the patients findings and my recommendations with patient and and Dr. Ricki Fan MD  02/03/21  15:05 EST    Much of this encounter note is an electronic transcription/translation of spoken language to printed text using Dragon Software

## 2021-02-03 NOTE — NURSING NOTE
NURSING PROGRESS NOTE: Patient arrived to [facility] from MD office at 0945.  Here for LABS and hydration.  States is not having any diarrhea at this time and only seems to have any if she eats anything.  History and medications reviewed.  Resting quietly in recliner for infusion. STEWART Keita.  Received call from Dr. Fan at 1200.  Patient to be admitted when room is available.  Neela RN

## 2021-02-03 NOTE — NURSING NOTE
Patient transferred to room 1310 per W/C.  Normal Saline continues to infuse at 125ml/hr as ordered.  Report given to STEWART Tolbetr

## 2021-02-04 ENCOUNTER — ANESTHESIA EVENT (OUTPATIENT)
Dept: PERIOP | Facility: HOSPITAL | Age: 66
End: 2021-02-04

## 2021-02-04 ENCOUNTER — APPOINTMENT (OUTPATIENT)
Dept: GENERAL RADIOLOGY | Facility: HOSPITAL | Age: 66
End: 2021-02-04

## 2021-02-04 ENCOUNTER — ANESTHESIA (OUTPATIENT)
Dept: PERIOP | Facility: HOSPITAL | Age: 66
End: 2021-02-04

## 2021-02-04 LAB
ALBUMIN SERPL-MCNC: 3 G/DL (ref 3.5–5.2)
ALP SERPL-CCNC: 455 U/L (ref 39–117)
ALT SERPL W P-5'-P-CCNC: 45 U/L (ref 1–33)
ANION GAP SERPL CALCULATED.3IONS-SCNC: 11.5 MMOL/L (ref 5–15)
ANION GAP SERPL CALCULATED.3IONS-SCNC: 18 MMOL/L (ref 5–15)
AST SERPL-CCNC: 45 U/L (ref 1–32)
BILIRUB CONJ SERPL-MCNC: 0.2 MG/DL (ref 0–0.3)
BILIRUB INDIRECT SERPL-MCNC: 0.4 MG/DL
BILIRUB SERPL-MCNC: 0.6 MG/DL (ref 0–1.2)
BUN SERPL-MCNC: 24 MG/DL (ref 8–23)
BUN SERPL-MCNC: 24 MG/DL (ref 8–23)
BUN/CREAT SERPL: 16.6 (ref 7–25)
BUN/CREAT SERPL: 20.7 (ref 7–25)
C DIFF GDH STL QL: NEGATIVE
CALCIUM SPEC-SCNC: 9.3 MG/DL (ref 8.6–10.5)
CALCIUM SPEC-SCNC: 9.5 MG/DL (ref 8.6–10.5)
CHLORIDE SERPL-SCNC: 92 MMOL/L (ref 98–107)
CHLORIDE SERPL-SCNC: 94 MMOL/L (ref 98–107)
CO2 SERPL-SCNC: 23 MMOL/L (ref 22–29)
CO2 SERPL-SCNC: 30.5 MMOL/L (ref 22–29)
CREAT SERPL-MCNC: 1.16 MG/DL (ref 0.57–1)
CREAT SERPL-MCNC: 1.45 MG/DL (ref 0.57–1)
DEPRECATED RDW RBC AUTO: 44 FL (ref 37–54)
ERYTHROCYTE [DISTWIDTH] IN BLOOD BY AUTOMATED COUNT: 13.1 % (ref 12.3–15.4)
GFR SERPL CREATININE-BSD FRML MDRD: 36 ML/MIN/1.73
GFR SERPL CREATININE-BSD FRML MDRD: 47 ML/MIN/1.73
GLUCOSE BLDC GLUCOMTR-MCNC: 116 MG/DL (ref 70–130)
GLUCOSE BLDC GLUCOMTR-MCNC: 119 MG/DL (ref 70–130)
GLUCOSE BLDC GLUCOMTR-MCNC: 126 MG/DL (ref 70–130)
GLUCOSE SERPL-MCNC: 116 MG/DL (ref 65–99)
GLUCOSE SERPL-MCNC: 183 MG/DL (ref 65–99)
HCT VFR BLD AUTO: 33.4 % (ref 34–46.6)
HGB BLD-MCNC: 10.6 G/DL (ref 12–15.9)
MCH RBC QN AUTO: 29.2 PG (ref 26.6–33)
MCHC RBC AUTO-ENTMCNC: 31.7 G/DL (ref 31.5–35.7)
MCV RBC AUTO: 92 FL (ref 79–97)
PLATELET # BLD AUTO: 430 10*3/MM3 (ref 140–450)
PMV BLD AUTO: 9.4 FL (ref 6–12)
POTASSIUM SERPL-SCNC: 3.8 MMOL/L (ref 3.5–5.2)
POTASSIUM SERPL-SCNC: 4 MMOL/L (ref 3.5–5.2)
PROT SERPL-MCNC: 7.7 G/DL (ref 6–8.5)
RBC # BLD AUTO: 3.63 10*6/MM3 (ref 3.77–5.28)
SODIUM SERPL-SCNC: 133 MMOL/L (ref 136–145)
SODIUM SERPL-SCNC: 136 MMOL/L (ref 136–145)
WBC # BLD AUTO: 14.88 10*3/MM3 (ref 3.4–10.8)

## 2021-02-04 PROCEDURE — 87324 CLOSTRIDIUM AG IA: CPT | Performed by: FAMILY MEDICINE

## 2021-02-04 PROCEDURE — 0FJ44ZZ INSPECTION OF GALLBLADDER, PERCUTANEOUS ENDOSCOPIC APPROACH: ICD-10-PCS | Performed by: SURGERY

## 2021-02-04 PROCEDURE — 94799 UNLISTED PULMONARY SVC/PX: CPT

## 2021-02-04 PROCEDURE — 25010000002 FENTANYL CITRATE (PF) 100 MCG/2ML SOLUTION: Performed by: NURSE ANESTHETIST, CERTIFIED REGISTERED

## 2021-02-04 PROCEDURE — 0FT40ZZ RESECTION OF GALLBLADDER, OPEN APPROACH: ICD-10-PCS | Performed by: SURGERY

## 2021-02-04 PROCEDURE — 25010000002 ONDANSETRON PER 1 MG: Performed by: NURSE ANESTHETIST, CERTIFIED REGISTERED

## 2021-02-04 PROCEDURE — 25010000002 PROPOFOL 10 MG/ML EMULSION: Performed by: NURSE ANESTHETIST, CERTIFIED REGISTERED

## 2021-02-04 PROCEDURE — 80048 BASIC METABOLIC PNL TOTAL CA: CPT | Performed by: FAMILY MEDICINE

## 2021-02-04 PROCEDURE — 25010000002 DEXAMETHASONE PER 1 MG: Performed by: NURSE ANESTHETIST, CERTIFIED REGISTERED

## 2021-02-04 PROCEDURE — 88304 TISSUE EXAM BY PATHOLOGIST: CPT | Performed by: SURGERY

## 2021-02-04 PROCEDURE — 99221 1ST HOSP IP/OBS SF/LOW 40: CPT | Performed by: SURGERY

## 2021-02-04 PROCEDURE — 47605 CHOLECYSTECTOMY W/CHOLANG: CPT | Performed by: SURGERY

## 2021-02-04 PROCEDURE — 80076 HEPATIC FUNCTION PANEL: CPT | Performed by: FAMILY MEDICINE

## 2021-02-04 PROCEDURE — 80048 BASIC METABOLIC PNL TOTAL CA: CPT | Performed by: SURGERY

## 2021-02-04 PROCEDURE — 93005 ELECTROCARDIOGRAM TRACING: CPT | Performed by: NURSE ANESTHETIST, CERTIFIED REGISTERED

## 2021-02-04 PROCEDURE — 25010000002 HYDROMORPHONE PER 4 MG: Performed by: NURSE ANESTHETIST, CERTIFIED REGISTERED

## 2021-02-04 PROCEDURE — 25010000002 IOPAMIDOL 61 % SOLUTION: Performed by: SURGERY

## 2021-02-04 PROCEDURE — 25010000002 SUCCINYLCHOLINE PER 20 MG: Performed by: NURSE ANESTHETIST, CERTIFIED REGISTERED

## 2021-02-04 PROCEDURE — 87449 NOS EACH ORGANISM AG IA: CPT | Performed by: FAMILY MEDICINE

## 2021-02-04 PROCEDURE — 93010 ELECTROCARDIOGRAM REPORT: CPT | Performed by: INTERNAL MEDICINE

## 2021-02-04 PROCEDURE — 25010000002 NEOSTIGMINE 10 MG/10ML SOLUTION: Performed by: NURSE ANESTHETIST, CERTIFIED REGISTERED

## 2021-02-04 PROCEDURE — 82962 GLUCOSE BLOOD TEST: CPT

## 2021-02-04 PROCEDURE — 85027 COMPLETE CBC AUTOMATED: CPT | Performed by: FAMILY MEDICINE

## 2021-02-04 PROCEDURE — 47605 CHOLECYSTECTOMY W/CHOLANG: CPT | Performed by: SPECIALIST/TECHNOLOGIST, OTHER

## 2021-02-04 DEVICE — FLOSEAL HEMOSTATIC MATRIX, 5ML
Type: IMPLANTABLE DEVICE | Site: ABDOMEN | Status: FUNCTIONAL
Brand: FLOSEAL HEMOSTATIC MATRIX

## 2021-02-04 DEVICE — CLIP LIGAT VASC HORIZON TI MD/LG GRN 6CT: Type: IMPLANTABLE DEVICE | Site: ABDOMEN | Status: FUNCTIONAL

## 2021-02-04 RX ORDER — HYDROCODONE BITARTRATE AND ACETAMINOPHEN 7.5; 325 MG/1; MG/1
1 TABLET ORAL EVERY 4 HOURS PRN
Status: DISCONTINUED | OUTPATIENT
Start: 2021-02-04 | End: 2021-02-11 | Stop reason: HOSPADM

## 2021-02-04 RX ORDER — SODIUM CHLORIDE 9 MG/ML
INJECTION, SOLUTION INTRAVENOUS AS NEEDED
Status: DISCONTINUED | OUTPATIENT
Start: 2021-02-04 | End: 2021-02-04 | Stop reason: HOSPADM

## 2021-02-04 RX ORDER — HYDROMORPHONE HCL 110MG/55ML
0.5 PATIENT CONTROLLED ANALGESIA SYRINGE INTRAVENOUS
Status: DISCONTINUED | OUTPATIENT
Start: 2021-02-04 | End: 2021-02-10

## 2021-02-04 RX ORDER — KETAMINE HYDROCHLORIDE 10 MG/ML
INJECTION INTRAMUSCULAR; INTRAVENOUS AS NEEDED
Status: DISCONTINUED | OUTPATIENT
Start: 2021-02-04 | End: 2021-02-04 | Stop reason: SURG

## 2021-02-04 RX ORDER — SUCCINYLCHOLINE CHLORIDE 20 MG/ML
INJECTION INTRAMUSCULAR; INTRAVENOUS AS NEEDED
Status: DISCONTINUED | OUTPATIENT
Start: 2021-02-04 | End: 2021-02-04 | Stop reason: SURG

## 2021-02-04 RX ORDER — ACETAMINOPHEN 500 MG
1000 TABLET ORAL ONCE
Status: COMPLETED | OUTPATIENT
Start: 2021-02-04 | End: 2021-02-04

## 2021-02-04 RX ORDER — FAMOTIDINE 10 MG/ML
20 INJECTION, SOLUTION INTRAVENOUS
Status: DISCONTINUED | OUTPATIENT
Start: 2021-02-04 | End: 2021-02-04 | Stop reason: HOSPADM

## 2021-02-04 RX ORDER — SODIUM CHLORIDE, SODIUM LACTATE, POTASSIUM CHLORIDE, CALCIUM CHLORIDE 600; 310; 30; 20 MG/100ML; MG/100ML; MG/100ML; MG/100ML
50 INJECTION, SOLUTION INTRAVENOUS CONTINUOUS
Status: DISCONTINUED | OUTPATIENT
Start: 2021-02-04 | End: 2021-02-07

## 2021-02-04 RX ORDER — ACETAMINOPHEN 650 MG/1
650 SUPPOSITORY RECTAL EVERY 4 HOURS PRN
Status: DISCONTINUED | OUTPATIENT
Start: 2021-02-04 | End: 2021-02-11 | Stop reason: HOSPADM

## 2021-02-04 RX ORDER — HYDROMORPHONE HYDROCHLORIDE 1 MG/ML
0.5 INJECTION, SOLUTION INTRAMUSCULAR; INTRAVENOUS; SUBCUTANEOUS
Status: DISCONTINUED | OUTPATIENT
Start: 2021-02-04 | End: 2021-02-04 | Stop reason: HOSPADM

## 2021-02-04 RX ORDER — ONDANSETRON 4 MG/1
4 TABLET, FILM COATED ORAL 4 TIMES DAILY PRN
Status: DISCONTINUED | OUTPATIENT
Start: 2021-02-04 | End: 2021-02-11 | Stop reason: HOSPADM

## 2021-02-04 RX ORDER — ONDANSETRON 2 MG/ML
4 INJECTION INTRAMUSCULAR; INTRAVENOUS ONCE AS NEEDED
Status: DISCONTINUED | OUTPATIENT
Start: 2021-02-04 | End: 2021-02-04 | Stop reason: HOSPADM

## 2021-02-04 RX ORDER — DEXAMETHASONE SODIUM PHOSPHATE 4 MG/ML
4 INJECTION, SOLUTION INTRA-ARTICULAR; INTRALESIONAL; INTRAMUSCULAR; INTRAVENOUS; SOFT TISSUE ONCE AS NEEDED
Status: COMPLETED | OUTPATIENT
Start: 2021-02-04 | End: 2021-02-04

## 2021-02-04 RX ORDER — NALOXONE HCL 0.4 MG/ML
0.1 VIAL (ML) INJECTION
Status: DISCONTINUED | OUTPATIENT
Start: 2021-02-04 | End: 2021-02-10

## 2021-02-04 RX ORDER — HYDROMORPHONE HYDROCHLORIDE 1 MG/ML
0.25 INJECTION, SOLUTION INTRAMUSCULAR; INTRAVENOUS; SUBCUTANEOUS
Status: DISCONTINUED | OUTPATIENT
Start: 2021-02-04 | End: 2021-02-04 | Stop reason: HOSPADM

## 2021-02-04 RX ORDER — FENTANYL CITRATE 50 UG/ML
INJECTION, SOLUTION INTRAMUSCULAR; INTRAVENOUS AS NEEDED
Status: DISCONTINUED | OUTPATIENT
Start: 2021-02-04 | End: 2021-02-04 | Stop reason: SURG

## 2021-02-04 RX ORDER — ROCURONIUM BROMIDE 10 MG/ML
INJECTION, SOLUTION INTRAVENOUS AS NEEDED
Status: DISCONTINUED | OUTPATIENT
Start: 2021-02-04 | End: 2021-02-04 | Stop reason: SURG

## 2021-02-04 RX ORDER — ONDANSETRON 2 MG/ML
4 INJECTION INTRAMUSCULAR; INTRAVENOUS ONCE
Status: COMPLETED | OUTPATIENT
Start: 2021-02-04 | End: 2021-02-04

## 2021-02-04 RX ORDER — BUPIVACAINE HYDROCHLORIDE AND EPINEPHRINE 5; 5 MG/ML; UG/ML
INJECTION, SOLUTION EPIDURAL; INTRACAUDAL; PERINEURAL AS NEEDED
Status: DISCONTINUED | OUTPATIENT
Start: 2021-02-04 | End: 2021-02-04 | Stop reason: HOSPADM

## 2021-02-04 RX ORDER — NEOSTIGMINE METHYLSULFATE 1 MG/ML
INJECTION, SOLUTION INTRAVENOUS AS NEEDED
Status: DISCONTINUED | OUTPATIENT
Start: 2021-02-04 | End: 2021-02-04 | Stop reason: SURG

## 2021-02-04 RX ORDER — ACETAMINOPHEN 325 MG/1
650 TABLET ORAL EVERY 4 HOURS PRN
Status: DISCONTINUED | OUTPATIENT
Start: 2021-02-04 | End: 2021-02-11 | Stop reason: HOSPADM

## 2021-02-04 RX ORDER — ONDANSETRON 2 MG/ML
4 INJECTION INTRAMUSCULAR; INTRAVENOUS 4 TIMES DAILY PRN
Status: DISCONTINUED | OUTPATIENT
Start: 2021-02-04 | End: 2021-02-11 | Stop reason: HOSPADM

## 2021-02-04 RX ORDER — FENTANYL CITRATE 50 UG/ML
50 INJECTION, SOLUTION INTRAMUSCULAR; INTRAVENOUS
Status: DISCONTINUED | OUTPATIENT
Start: 2021-02-04 | End: 2021-02-04 | Stop reason: HOSPADM

## 2021-02-04 RX ORDER — HYDROCODONE BITARTRATE AND ACETAMINOPHEN 5; 325 MG/1; MG/1
1 TABLET ORAL EVERY 4 HOURS PRN
Status: DISCONTINUED | OUTPATIENT
Start: 2021-02-04 | End: 2021-02-11 | Stop reason: HOSPADM

## 2021-02-04 RX ORDER — PROPOFOL 10 MG/ML
VIAL (ML) INTRAVENOUS AS NEEDED
Status: DISCONTINUED | OUTPATIENT
Start: 2021-02-04 | End: 2021-02-04 | Stop reason: SURG

## 2021-02-04 RX ORDER — LIDOCAINE HYDROCHLORIDE 20 MG/ML
INJECTION, SOLUTION INFILTRATION; PERINEURAL AS NEEDED
Status: DISCONTINUED | OUTPATIENT
Start: 2021-02-04 | End: 2021-02-04 | Stop reason: SURG

## 2021-02-04 RX ORDER — ESMOLOL HYDROCHLORIDE 10 MG/ML
INJECTION INTRAVENOUS AS NEEDED
Status: DISCONTINUED | OUTPATIENT
Start: 2021-02-04 | End: 2021-02-04 | Stop reason: SURG

## 2021-02-04 RX ORDER — GLYCOPYRROLATE 0.2 MG/ML
INJECTION INTRAMUSCULAR; INTRAVENOUS AS NEEDED
Status: DISCONTINUED | OUTPATIENT
Start: 2021-02-04 | End: 2021-02-04 | Stop reason: SURG

## 2021-02-04 RX ORDER — MAGNESIUM HYDROXIDE 1200 MG/15ML
LIQUID ORAL AS NEEDED
Status: DISCONTINUED | OUTPATIENT
Start: 2021-02-04 | End: 2021-02-04 | Stop reason: HOSPADM

## 2021-02-04 RX ADMIN — KETAMINE HYDROCHLORIDE 20 MG: 10 INJECTION, SOLUTION INTRAMUSCULAR; INTRAVENOUS at 15:19

## 2021-02-04 RX ADMIN — ESMOLOL HYDROCHLORIDE 30 MG: 10 INJECTION, SOLUTION INTRAVENOUS at 15:27

## 2021-02-04 RX ADMIN — ONDANSETRON 4 MG: 2 INJECTION, SOLUTION INTRAMUSCULAR; INTRAVENOUS at 15:09

## 2021-02-04 RX ADMIN — NEOSTIGMINE METHYLSULFATE 2.5 MG: 1 INJECTION INTRAVENOUS at 17:20

## 2021-02-04 RX ADMIN — FENTANYL CITRATE 50 MCG: 50 INJECTION INTRAMUSCULAR; INTRAVENOUS at 18:42

## 2021-02-04 RX ADMIN — PANTOPRAZOLE SODIUM 40 MG: 40 TABLET, DELAYED RELEASE ORAL at 06:16

## 2021-02-04 RX ADMIN — GLYCOPYRROLATE 0.4 MG: 0.2 INJECTION INTRAMUSCULAR; INTRAVENOUS at 17:20

## 2021-02-04 RX ADMIN — DEXAMETHASONE SODIUM PHOSPHATE 4 MG: 4 INJECTION, SOLUTION INTRAMUSCULAR; INTRAVENOUS at 15:09

## 2021-02-04 RX ADMIN — FAMOTIDINE 20 MG: 10 INJECTION, SOLUTION INTRAVENOUS at 15:09

## 2021-02-04 RX ADMIN — PROPOFOL 150 MG: 10 INJECTION, EMULSION INTRAVENOUS at 15:19

## 2021-02-04 RX ADMIN — SODIUM CHLORIDE, PRESERVATIVE FREE 10 ML: 5 INJECTION INTRAVENOUS at 20:52

## 2021-02-04 RX ADMIN — SODIUM CHLORIDE, PRESERVATIVE FREE 10 ML: 5 INJECTION INTRAVENOUS at 20:51

## 2021-02-04 RX ADMIN — ALLOPURINOL 100 MG: 100 TABLET ORAL at 20:11

## 2021-02-04 RX ADMIN — FENTANYL CITRATE 50 MCG: 50 INJECTION, SOLUTION INTRAMUSCULAR; INTRAVENOUS at 15:43

## 2021-02-04 RX ADMIN — ROCURONIUM BROMIDE 30 MG: 10 INJECTION, SOLUTION INTRAVENOUS at 15:25

## 2021-02-04 RX ADMIN — SODIUM CHLORIDE, POTASSIUM CHLORIDE, SODIUM LACTATE AND CALCIUM CHLORIDE 100 ML/HR: 600; 310; 30; 20 INJECTION, SOLUTION INTRAVENOUS at 20:11

## 2021-02-04 RX ADMIN — ACETAMINOPHEN 1000 MG: 500 TABLET, FILM COATED ORAL at 15:08

## 2021-02-04 RX ADMIN — FENTANYL CITRATE 100 MCG: 50 INJECTION, SOLUTION INTRAMUSCULAR; INTRAVENOUS at 17:24

## 2021-02-04 RX ADMIN — HYDROCODONE BITARTRATE AND ACETAMINOPHEN 1 TABLET: 7.5; 325 TABLET ORAL at 20:58

## 2021-02-04 RX ADMIN — METOPROLOL TARTRATE 2.5 MG: 1 INJECTION, SOLUTION INTRAVENOUS at 15:36

## 2021-02-04 RX ADMIN — SUCCINYLCHOLINE CHLORIDE 100 MG: 20 INJECTION, SOLUTION INTRAMUSCULAR; INTRAVENOUS at 15:19

## 2021-02-04 RX ADMIN — FENTANYL CITRATE 50 MCG: 50 INJECTION, SOLUTION INTRAMUSCULAR; INTRAVENOUS at 15:19

## 2021-02-04 RX ADMIN — PREGABALIN 75 MG: 75 CAPSULE ORAL at 20:11

## 2021-02-04 RX ADMIN — HYDROMORPHONE HYDROCHLORIDE 0.25 MG: 1 INJECTION, SOLUTION INTRAMUSCULAR; INTRAVENOUS; SUBCUTANEOUS at 18:16

## 2021-02-04 RX ADMIN — METOPROLOL TARTRATE 2.5 MG: 1 INJECTION, SOLUTION INTRAVENOUS at 15:44

## 2021-02-04 RX ADMIN — ROCURONIUM BROMIDE 10 MG: 10 INJECTION, SOLUTION INTRAVENOUS at 16:18

## 2021-02-04 RX ADMIN — SODIUM CHLORIDE, POTASSIUM CHLORIDE, SODIUM LACTATE AND CALCIUM CHLORIDE 100 ML/HR: 600; 310; 30; 20 INJECTION, SOLUTION INTRAVENOUS at 04:15

## 2021-02-04 RX ADMIN — SODIUM CHLORIDE, POTASSIUM CHLORIDE, SODIUM LACTATE AND CALCIUM CHLORIDE 100 ML/HR: 600; 310; 30; 20 INJECTION, SOLUTION INTRAVENOUS at 15:00

## 2021-02-04 RX ADMIN — HYDROMORPHONE HYDROCHLORIDE 0.5 MG: 1 INJECTION, SOLUTION INTRAMUSCULAR; INTRAVENOUS; SUBCUTANEOUS at 18:30

## 2021-02-04 RX ADMIN — LIDOCAINE HYDROCHLORIDE 100 MG: 20 INJECTION, SOLUTION INFILTRATION; PERINEURAL at 15:19

## 2021-02-04 RX ADMIN — FENTANYL CITRATE 50 MCG: 50 INJECTION INTRAMUSCULAR; INTRAVENOUS at 19:02

## 2021-02-04 NOTE — PROGRESS NOTES
"Daily Progress Note:      Chief complaint: Follow-up of cholecystitis, hypertension, chronic kidney disease, diarrhea    Subjective: Continues to have intermittent diarrhea.  Patient does complain of some mild right upper quadrant abdominal pain since admission.  No nausea vomiting     LOS: 1 day     Vital Signs  Temp:  [97.6 °F (36.4 °C)-100.6 °F (38.1 °C)] 97.9 °F (36.6 °C)  Heart Rate:  [68-77] 73  Resp:  [16-18] 18  BP: ()/(61-71) 127/68  Oxygen Therapy  SpO2: 95 %  Device (Oxygen Therapy): room air}  Body mass index is 34.76 kg/m².  Flowsheet Rows      First Filed Value   Admission Height  152.4 cm (60\") Documented at 02/03/2021 1330   Admission Weight  80.7 kg (178 lb) Documented at 02/03/2021 1330                   Documented weights    02/03/21 1330   Weight: 80.7 kg (178 lb)           Patient Vitals for the past 24 hrs:   BP Temp Temp src Pulse Resp SpO2 Height Weight   02/04/21 0500 127/68 97.9 °F (36.6 °C) Oral 73 18 95 % -- --   02/03/21 2253 -- 97.6 °F (36.4 °C) Oral -- -- -- -- --   02/03/21 2057 144/71 (!) 100.6 °F (38.1 °C) Oral 77 18 98 % -- --   02/03/21 1535 120/61 97.6 °F (36.4 °C) Oral 68 18 100 % -- --   02/03/21 1412 -- -- -- -- -- 100 % -- --   02/03/21 1330 125/63 97.9 °F (36.6 °C) Oral 72 18 (!) 84 % 152.4 cm (60\") 80.7 kg (178 lb)       80.7 kg (178 lb)      Intake/Output Summary (Last 24 hours) at 2/4/2021 0730  Last data filed at 2/3/2021 1841  Gross per 24 hour   Intake 1450 ml   Output 100 ml   Net 1350 ml       Review of Systems   Constitutional: Positive for fatigue. Negative for activity change and appetite change.   HENT: Negative for congestion.    Respiratory: Negative for cough, chest tightness, shortness of breath and wheezing.    Cardiovascular: Negative for chest pain.   Gastrointestinal: Positive for abdominal pain and diarrhea. Negative for abdominal distention, nausea and vomiting.   Endocrine: Negative for polyphagia and polyuria.   Genitourinary: Negative for " frequency.   Skin: Negative for rash.   Neurological: Positive for weakness. Negative for light-headedness.   Hematological: Does not bruise/bleed easily.   Psychiatric/Behavioral: Negative for agitation and behavioral problems.       Physical Exam  Vitals signs and nursing note reviewed.   Constitutional:       Appearance: She is well-developed. She is obese.   HENT:      Head: Normocephalic.   Eyes:      Conjunctiva/sclera: Conjunctivae normal.   Neck:      Musculoskeletal: Normal range of motion.      Thyroid: No thyromegaly.      Vascular: No JVD.   Cardiovascular:      Rate and Rhythm: Normal rate and regular rhythm.      Heart sounds: Normal heart sounds. No murmur.   Pulmonary:      Effort: Pulmonary effort is normal. No respiratory distress.      Breath sounds: Normal breath sounds. No wheezing or rales.   Abdominal:      General: Bowel sounds are normal. There is no distension.      Palpations: Abdomen is soft.      Tenderness: There is abdominal tenderness in the right upper quadrant. There is no guarding.   Skin:     General: Skin is warm and dry.      Findings: No rash.   Neurological:      Mental Status: She is alert.         Medication Review:   I have reviewed the patient's current medication list  Scheduled Meds:allopurinol, 100 mg, Oral, BID  atorvastatin, 20 mg, Oral, Daily  cefTRIAXone, 1 g, Intravenous, Q24H  insulin aspart, 0-7 Units, Subcutaneous, TID AC  pantoprazole, 40 mg, Oral, QAM  predniSONE, 1 mg, Oral, Daily  pregabalin, 75 mg, Oral, Q12H  sodium chloride, 10 mL, Intravenous, Q12H  sodium chloride, 10 mL, Intravenous, Q12H      Continuous Infusions:lactated ringers, 100 mL/hr, Last Rate: 100 mL/hr (02/04/21 2084)      PRN Meds:.•  acetaminophen **OR** acetaminophen **OR** acetaminophen  •  dextrose  •  dextrose  •  glucagon (human recombinant)  •  ondansetron **OR** ondansetron  •  sodium chloride  •  sodium chloride  •  sodium chloride      Labs:  Results from last 7 days   Lab Units  02/04/21  0351 02/03/21  1032   WBC 10*3/mm3 14.88* 17.74*   HEMOGLOBIN g/dL 10.6* 11.7*   HEMATOCRIT % 33.4* 36.4   PLATELETS 10*3/mm3 430 519*     Results from last 7 days   Lab Units 02/04/21  0351 02/03/21  1032   SODIUM mmol/L 136 135*   POTASSIUM mmol/L 3.8 3.6   CHLORIDE mmol/L 94* 93*   CO2 mmol/L 30.5* 29.5*   BUN mg/dL 24* 29*   CREATININE mg/dL 1.16* 1.40*   CALCIUM mg/dL 9.5 10.0   BILIRUBIN mg/dL 0.6 0.6   ALK PHOS U/L 455* 487*   ALT (SGPT) U/L 45* 43*   AST (SGOT) U/L 45* 39*   GLUCOSE mg/dL 116* 158*           Results from last 7 days   Lab Units 02/04/21  0351 02/03/21  1540 02/03/21  1032   AST (SGOT) U/L 45*  --  39*   ALT (SGPT) U/L 45*  --  43*   LACTATE mmol/L  --  1.0  --    PLATELETS 10*3/mm3 430  --  519*         Lab Results (last 24 hours)     Procedure Component Value Units Date/Time    POC Glucose Once [[id number]]  (Normal) Collected: 02/04/21 0717    Specimen: Blood Updated: 02/04/21 0724     Glucose 116 mg/dL     Hepatic Function Panel [[id number]]  (Abnormal) Collected: 02/04/21 0351    Specimen: Blood Updated: 02/04/21 0721     Total Protein 7.7 g/dL      Albumin 3.00 g/dL      ALT (SGPT) 45 U/L      AST (SGOT) 45 U/L      Alkaline Phosphatase 455 U/L      Total Bilirubin 0.6 mg/dL      Bilirubin, Direct 0.2 mg/dL      Bilirubin, Indirect 0.4 mg/dL     Basic Metabolic Panel [[id number]]  (Abnormal) Collected: 02/04/21 0351    Specimen: Blood Updated: 02/04/21 0620     Glucose 116 mg/dL      BUN 24 mg/dL      Creatinine 1.16 mg/dL      Sodium 136 mmol/L      Potassium 3.8 mmol/L      Chloride 94 mmol/L      CO2 30.5 mmol/L      Calcium 9.5 mg/dL      eGFR Non African Amer 47 mL/min/1.73      BUN/Creatinine Ratio 20.7     Anion Gap 11.5 mmol/L     Narrative:      GFR Normal >60  Chronic Kidney Disease <60  Kidney Failure <15      CBC (No Diff) [[id number]]  (Abnormal) Collected: 02/04/21 0351    Specimen: Blood Updated: 02/04/21 0450     WBC 14.88 10*3/mm3      RBC 3.63 10*6/mm3       Hemoglobin 10.6 g/dL      Hematocrit 33.4 %      MCV 92.0 fL      MCH 29.2 pg      MCHC 31.7 g/dL      RDW 13.1 %      RDW-SD 44.0 fl      MPV 9.4 fL      Platelets 430 10*3/mm3     POC Glucose Once [[id number]]  (Normal) Collected: 02/04/21 0005    Specimen: Blood Updated: 02/04/21 0011     Glucose 126 mg/dL     COVID PRE-OP / PRE-PROCEDURE SCREENING ORDER (NO ISOLATION) - Swab, Nasal Cavity [[id number]]  (Normal) Collected: 02/03/21 2146    Specimen: Swab from Nasal Cavity Updated: 02/03/21 2237    Narrative:      The following orders were created for panel order COVID PRE-OP / PRE-PROCEDURE SCREENING ORDER (NO ISOLATION) - Swab, Nasal Cavity.  Procedure                               Abnormality         Status                     ---------                               -----------         ------                     COVID-19,[facility]...[[id number]]  Normal              Final result                 Please view results for these tests on the individual orders.    COVID-19,[facility] IN-HOUSE,Nasal Swab No Transport Media 3-4 HR TAT - Swab, Nasal Cavity [[id number]]  (Normal) Collected: 02/03/21 2146    Specimen: Swab from Nasal Cavity Updated: 02/03/21 2237     COVID19 Not Detected    Narrative:      Fact sheet for providers: https://www.fda.gov/media/823202/download     Fact sheet for patients: https://www.fda.gov/media/145011/download    Test performed by PCR.    Consider negative results in combination with clinical observations, patient history, and epidemiological information.    Gastrointestinal Panel, PCR - Stool, Per Rectum [[id number]]  (Normal) Collected: 02/03/21 1627    Specimen: Stool from Per Rectum Updated: 02/03/21 2058     Campylobacter Not Detected     Plesiomonas shigelloides Not Detected     Salmonella Not Detected     Vibrio Not Detected     Vibrio cholerae Not Detected     Yersinia enterocolitica Not Detected     Enteroaggregative E. coli (EAEC) Not Detected     Enteropathogenic E. coli (EPEC)  Not Detected     Enterotoxigenic E. coli (ETEC) lt/st Not Detected     Shiga-like toxin-producing E. coli (STEC) stx1/stx2 Not Detected     Shigella/Enteroinvasive E. coli (EIEC) Not Detected     Cryptosporidium Not Detected     Cyclospora cayetanensis Not Detected     Entamoeba histolytica Not Detected     Giardia lamblia Not Detected     Adenovirus F40/41 Not Detected     Astrovirus Not Detected     Norovirus GI/GII Not Detected     Rotavirus A Not Detected     Sapovirus (I, II, IV or V) Not Detected    Narrative:      If Aeromonas, Staphylococcus aureus or Bacillus cereus are suspected, please order [id number]: Stool Culture, Aeromonas, S aureus, B Cereus.    POC Glucose Once [[id number]]  (Abnormal) Collected: 02/03/21 1629    Specimen: Blood Updated: 02/03/21 1643     Glucose 150 mg/dL     Lactic Acid, Plasma [[id number]]  (Normal) Collected: 02/03/21 1540    Specimen: Blood Updated: 02/03/21 1606     Lactate 1.0 mmol/L                                         Glucose   Date/Time Value Ref Range Status   02/04/2021 0717 116 70 - 130 mg/dL Final   02/04/2021 0005 126 70 - 130 mg/dL Final   02/03/2021 1629 150 (H) 70 - 130 mg/dL Final     Results from last 7 days   Lab Units 02/03/21  1540   LACTATE mmol/L 1.0             Results from last 7 days   Lab Units 02/03/21  1627   ADENOVIRUS  Not Detected         Radiology:  Imaging Results (Last 24 Hours)     Procedure Component Value Units Date/Time    XR Chest 2 View [[id number]] Collected: 02/03/21 1521     Updated: 02/03/21 1523    Narrative:      CR Chest 2 Vws    INDICATION:    66-year-old female with shortness of air for several weeks.    COMPARISON:    Chest 8/9/2013    FINDINGS:   PA and lateral views of the chest.  Heart and mediastinal contours are normal. The lungs are clear. No pneumothorax or pleural effusion.        Impression:      No acute cardiopulmonary findings.    Signer Name: Sonu Bautista MD   Signed: 2/3/2021 3:21 PM   Workstation Name: [id number]     [facility]          Cardiology:  ECG/EMG Results (last 24 hours)     ** No results found for the last 24 hours. **          I have reviewed recent labs results and consult notes.  Parts of this note may have been copied and pasted but patient was examined and interviewed by me today    Assessment and Plan:  1.  Acute possible gangrenous cholecystitis white count is improved now with some abdominal discomfort await surgical evaluation     2.  Intermittent episodes of diarrhea likely viral etiology GI panel C. difficile been ordered     3.  Adult-onset diabetes mellitus insulin-dependent controlled continue with Accu-Chek sliding cell insulin     4.  Hypertension nothing acute home medication will be continued        5.  Chronic kidney stage III recent mild elevation creatinine back to baseline at this time will monitor     6.  History of pulmonary sarcoidosis in remission continue with low-dose steroids        7.  Hyperuricemia nothing acute resume allopurinol once taking p.o.     8.  DVT prophylaxis SCD    Much of this encounter note is an electronic transcription/translation of spoken language to printed text using Dragon Software

## 2021-02-04 NOTE — OP NOTE
Op note:     Preop diagnosis: Gangrenous gallbladder    Postop diagnosis: Same    Procedure: Laparoscopic converted to open cholecystectomy    Surgeon:  Andie Robison MD    Assist: Hudson Mota    Anesthesia:  GET    Specimen: Gallbladder    Complications: None    EBL: 500 cc    Findings: Gangrenous gallbladder, gallbladder was full of pus and was not necrotic    Indications: This is a pleasant 66-year-old female patient who is having ongoing diarrhea.  Patient was admitted for further work-up with a CT scan and blood work.  Patient had white count of 17,000 and a CT scan that revealed a gangrenous gallbladder.    Procedure:   After general endotracheal anesthesia, patient was prepped and draped in the usual sterile fashion.  A periumbilical skin incision was performed with an 11 blade scalpel.  The subcutaneous tissue was bluntly dissected using S retractors exposing the fascia.  The fascia then was grasped with tonsils to the right and left and incised with an 11 blade scalpel.  Under direct visualization, a 10 mm trocar was placed and the abdomen was insufflated 15 mmHg.  Upon entering the abdomen, limited visual exploration was performed.  Which revealed a gallbladder with multiple omental adhesions.  A 10 mm trocar was placed in the subxiphoid area as well as two 5 mm trochars in the right upper quadrant all under direct visualization.  The gallbladder was quite tense.  A decompressive needle was attempted for drainage.  However upon trying to grasp the gallbladder it was necrotic and fell apart expelling copious amounts of purulent fluid.  Approximately, 200 cc of purulent fluid was suctioned.  Multiple attempts at grasping the gallbladder for retraction was performed however the gallbladder was necrotic.  Therefore, the procedure was converted to open.  All trochars were removed and the abdomen was desufflated.  A right upper quadrant skin incision was performed extending from the lateral 5 mm trocar to  the subxiphoid area.  Subcutaneous tissue was dissected using cautery.  The anterior rectus abdominis sheath then was incised exposing the rectus muscle.  Using a [employer] retractor, the rectus abdominis muscle was elevated and divided.  Peritoneum then was opened and extended throughout the length of the incision.  A rolled lap was then placed above the liver for exposure.  The gallbladder was unable to be grasped with instruments secondary to necrosis.  Therefore, finger fracture manipulation of the gallbladder from the gallbladder bed was carried out as well as anteriorly off of the transverse colon.  This was performed from the dome down from fundus to neck of the gallbladder.  The neck of the gallbladder was dissected using a right angle and blunt dissection with peanut applicators.  Once the cystic duct was identified, it was dissected in a circumferential manner.  The right angle was placed posterior to the cystic duct.  A 2-0 silk tie then was placed proximally and distally.  The duct then was divided with Metzenbaum scissors.  A right angle was placed on the proximal end of the cystic duct and a second 2-0 silk tie was secured.  The cystic artery then had double clips placed proximally and one distally was transected.  The gallbladder then was able to be delivered and submitted to pathology.  Copious irrigations was carried out with warm saline.  Hemostasis was obtained using cautery and FloSeal.  A 19 Tel[place] Shaw drain then was laid in the gallbladder fossa and right gutter.  It was brought up through the trocar site at the subxiphoid area and secured to the skin with a 2-0 silk suture.  All needles and sponge counts were correct x2.  The rectus abdominis muscle was closed with a running #1 PDS.  The subcutaneous tissue was closed with 3-0 Vicryl.  The subcutaneous tissue was irrigated with saline and Betadine.  All skin incisions were closed with staples.  The infraumbilical trocar site was closed in a  2 layer fashion.  Closing the fascia with figure 8-0 Vicryl.  The skin was closed with staples.  Sterile dressings were applied and the patient was transferred to recovery room in stable condition.      Andie Robison MD  General, Minimally Invasive and Endoscopic Surgery  [facility] [place] [id number] [address]    [address]  [address] [address]    P: [phone]  F: [phone]    Cc:  Hazel Fan MD

## 2021-02-04 NOTE — NURSING NOTE
Discharge Planning Assessment   Ashlyn Hilliard     Patient Name: Lisbeth Lucas  MRN: [id number]  Today's Date: 2/4/2021    Admit Date: 2/3/2021    Discharge Needs Assessment     Row Name 02/04/21 0930       Living Environment    Lives With  spouse    Current Living Arrangements  home/apartment/condo    Primary Care Provided by  self    Provides Primary Care For  no one    Family Caregiver if Needed  child(adarsh), adult;spouse    Quality of Family Relationships  helpful;involved;supportive    Able to Return to Prior Arrangements  yes       Resource/Environmental Concerns    Resource/Environmental Concerns  none       Transition Planning    Patient/Family Anticipates Transition to  home with family    Transportation Anticipated  family or friend will provide       Discharge Needs Assessment    Readmission Within the Last 30 Days  no previous admission in last 30 days    Equipment Currently Used at Home  none    Concerns to be Addressed  discharge planning    Anticipated Changes Related to Illness  none    Equipment Needed After Discharge  none        Discharge Plan     Row Name 02/04/21 0931       Plan    Plan  Home when stable    Provided Post Acute Provider List?  N/A    N/A Provider List Comment  Not at this time    Patient/Family in Agreement with Plan  yes    Plan Comments  Spoke with Mrs Lucas at bedside.  She and her  live in a home in [place].  She does not have any DME, oxygen or home health.  She is independent with her ADL's and drives herself. Her pharmacy is [facility] in [place]. She has an advanced directive on file.  Plan is home when stable.  Will continue to follow        Continued Care and Services - Admitted Since 2/3/2021    Coordination has not been started for this encounter.         Demographic Summary     Row Name 02/04/21 0926       General Information    Admission Type  inpatient    Arrived From  home    Required Notices Provided  Important Message from Medicare Signed by patient     Referral Source  admission list    Reason for Consult  discharge planning    Preferred Language  English     Used During This Interaction  no       Contact Information    Permission Granted to Share Info With          Functional Status    No documentation.       Psychosocial    No documentation.       Abuse/Neglect    No documentation.       Legal    No documentation.       Substance Abuse    No documentation.       Patient Forms    No documentation.           Ramila Avendaño RN

## 2021-02-04 NOTE — CONSULTS
CC:  Diarrhea     Patient is a 66 y.o. female referred by Hazel Fan MD for evaluation of gangrenous cholecystitis.  Patient had started having problems about a week ago with an episode of nausea and vomiting x1.  Then she began having intermittent diarrhea.  Patient had been evaluated at urgent care center and was Covid negative.  Patient then presented to Dr. Fan for continued diarrhea and fatigue.  Patient underwent CT scan that revealed an gangrenous cholecystitis with elevated white count of 17.  Patient denied any abdominal pain.  Patient denies jaundice, edward colored stools or dark urine.  Patient denies fever or chills.    Past Medical History:   Diagnosis Date   • Arthritis    • Diabetes mellitus (CMS/HCC)    • Gout    • Hyperlipidemia    • Hypertension      Past Surgical History:   Procedure Laterality Date   • HERNIA REPAIR     • HYSTERECTOMY       Family History   Problem Relation Age of Onset   • Breast cancer Neg Hx      Social History     Tobacco Use   • Smoking status: Never Smoker   • Smokeless tobacco: Never Used   Substance Use Topics   • Alcohol use: Never     Frequency: Never   • Drug use: Never     No Known Allergies    Current Facility-Administered Medications:   •  acetaminophen (TYLENOL) tablet 650 mg, 650 mg, Oral, Q4H PRN, 650 mg at 02/03/21 2143 **OR** acetaminophen (TYLENOL) 160 MG/5ML solution 650 mg, 650 mg, Oral, Q4H PRN **OR** acetaminophen (TYLENOL) suppository 650 mg, 650 mg, Rectal, Q4H PRN, Hazel Fan MD  •  allopurinol (ZYLOPRIM) tablet 100 mg, 100 mg, Oral, BID, Hazel Fan MD  •  atorvastatin (LIPITOR) tablet 20 mg, 20 mg, Oral, Daily, Hazel Fan MD, 20 mg at 02/03/21 1733  •  cefTRIAXone (ROCEPHIN) IVPB 1 g/50ml dextrose (premix), 1 g, Intravenous, Q24H, Hazel Fan MD, Last Rate: 100 mL/hr at 02/03/21 1731, 1 g at 02/03/21 1731  •  dextrose (D50W) 25 g/ 50mL Intravenous Solution 25 g, 25 g,  Intravenous, Q15 Min PRN, Hazel Fan MD  •  dextrose (GLUTOSE) oral gel 15 g, 15 g, Oral, Q15 Min PRN, Hazel Fan MD  •  glucagon (GLUCAGEN) injection 1 mg, 1 mg, Subcutaneous, Q15 Min PRN, Hazel Fan MD  •  insulin aspart (novoLOG) injection 0-7 Units, 0-7 Units, Subcutaneous, TID AC, Hazel Fan MD, 2 Units at 02/03/21 1733  •  lactated ringers infusion, 100 mL/hr, Intravenous, Continuous, Hazel Fan MD, Last Rate: 100 mL/hr at 02/04/21 0415, 100 mL/hr at 02/04/21 0415  •  ondansetron (ZOFRAN) tablet 4 mg, 4 mg, Oral, Q6H PRN **OR** ondansetron (ZOFRAN) injection 4 mg, 4 mg, Intravenous, Q6H PRN, Hazel Fan MD  •  pantoprazole (PROTONIX) EC tablet 40 mg, 40 mg, Oral, QAM, Hazel Fan MD, 40 mg at 02/04/21 0616  •  predniSONE (DELTASONE) tablet 1 mg, 1 mg, Oral, Daily, Hazel Fan MD  •  pregabalin (LYRICA) capsule 75 mg, 75 mg, Oral, Q12H, Hazel Fan MD, 75 mg at 02/03/21 2143  •  sodium chloride 0.9 % flush 1-10 mL, 1-10 mL, Intravenous, PRN, Hazel Fan MD  •  sodium chloride 0.9 % flush 10 mL, 10 mL, Intravenous, Q12H, Hazel Fan MD  •  sodium chloride 0.9 % flush 10 mL, 10 mL, Intravenous, PRN, Hazel Fan MD  •  sodium chloride 0.9 % flush 10 mL, 10 mL, Intravenous, Q12H, Hazel Fan MD  •  sodium chloride 0.9 % infusion 40 mL, 40 mL, Intravenous, PRN, Hazel Fan MD    Review of Systems  General: Patient reports fatigue  Eyes: Wears corrective lenses  Ears, nose, mouth and throat: No rhinitis, no hearing problems, no chronic cough  Cardiovascular/heart: Denies palpitations, syncope or chest pain  Respiratory/lung: Denies shortness of breath, hemoptysis, dyspnea on exertion   Genital/urinary: No frequency, hematuria or dysuria  Hematological/lymphatic: Denies anemia or other  problems  Musculoskeletal: No joint pain, no defects  Skin: No psoriasis or other skin issues  Neurological: No seizures or other neurological problems  Psychiatric: None  Endocrine: Negative  Gastro-intestinal: No constipation, positive diarrhea, no melena, no hematochezia  Vitals:    02/03/21 1535 02/03/21 2057 02/03/21 2253 02/04/21 0500   BP: 120/61 144/71  127/68   BP Location: Right arm Right arm  Right arm   Patient Position: Lying Lying  Lying   Pulse: 68 77  73   Resp: 18 18  18   Temp: 97.6 °F (36.4 °C) (!) 100.6 °F (38.1 °C) 97.6 °F (36.4 °C) 97.9 °F (36.6 °C)   TempSrc: Oral Oral Oral Oral   SpO2: 100% 98%  95%   Weight:       Height:           Physical Exam  General/physcological:   Alert and oriented x3, in no acute distress  HEENT: Normal cephalic, atraumatic, PERRLA, EOMI, sclera anicteric, moist mucous membranes, neck is supple, no JVD, no carotid bruits, no thyromegaly no adenopathy  Respiratory: CTA and percussion  CVA: RRR, normal S1-S2, no murmurs, no gallops or rubs  GI: Positive BS, soft, nondistended, nontender, no rebound, no guarding, no hernias, no organomegaly and no masses  Musculoskeletal: Moves all 4 ext, no clubbing, no cyanosis or edema  Neurovascular: Grossly intact  Debilities: none  Emotional behavior: appropriate   Results from last 7 days   Lab Units 02/04/21  0351   WBC 10*3/mm3 14.88*   HEMOGLOBIN g/dL 10.6*   HEMATOCRIT % 33.4*   PLATELETS 10*3/mm3 430     Results from last 7 days   Lab Units 02/04/21  0351   SODIUM mmol/L 136   POTASSIUM mmol/L 3.8   CHLORIDE mmol/L 94*   CO2 mmol/L 30.5*   BUN mg/dL 24*   CREATININE mg/dL 1.16*   CALCIUM mg/dL 9.5   BILIRUBIN mg/dL 0.6   ALK PHOS U/L 455*   ALT (SGPT) U/L 45*   AST (SGOT) U/L 45*   GLUCOSE mg/dL 116*     CT scan was reviewed which reveals grossly dilated thickened inflamed gallbladder    Assessment:  Acute gangrenous cholecystitis  Plan:  I have discussed this diagnosis with the patient and advised the patient that she  should undergo a laparoscopic cholecystectomy and intraoperative cholangiogram.  I have advised the patient that sometimes when the gallbladder is are gangrenous we do have to convert to an open cholecystectomy.  I have discussed the procedures in detail.  I have discussed the risks, benefits and alternatives.  I have discussed the risk of anesthesia, bleeding, infection, bowel injuries and common bile duct injuries.  Patient understands these risks, benefits and alternatives and wishes to proceed.    Andie Robison MD  General, Minimally Invasive and Endoscopic Surgery  [facility]      [address] [facility] [address] [place] [address]   [address]    [address]  [address] [address]    P: [phone]  F: [phone]    Cc:  Hazel Fan MD

## 2021-02-04 NOTE — PAYOR COMM NOTE
"Lisbeth Blanca (66 y.o. Female)    ATTN; NURSE REVIEWER   RE:  INPATIENT AUTH REQUEST FOR INPATIENT FOR [facility]  REF#[id number]  PLS REPLY TO GUILLERMO GREEN [phone] FAX# [phone]       Date of Birth Social Security Number Address Home Phone MRN    1955  [address] [place] [address] [phone] [id number]    Sikh Marital Status          None        Admission Date Admission Type Admitting Provider Attending Provider Department, Room/Bed    2/3/21 Urgent Hazel Fan MD Kemparajurs, Plavakeerthi, MD [facility] MED SURG, 1419/1    Discharge Date Discharge Disposition Discharge Destination                       Attending Provider: Hazel Fan MD    Allergies: No Known Allergies    Isolation: Spore   Infection: C.difficile (rule out) (02/04/21)   Code Status: CPR    Ht: 152.4 cm (60\")   Wt: 80.7 kg (178 lb)    Admission Cmt: None   Principal Problem: None                Active Insurance as of 2/3/2021     Primary Coverage     Payor Plan Insurance Group Employer/Plan Group    [facility]IC[facility]PLAC[facility]      Payor Plan Address Payor Plan Phone Number Payor Plan Fax Number Effective Dates    [address] [address] [phone]  7/8/2020 - None Entered    [facility] [address]       Subscriber Name Subscriber Birth Date Member ID       LISBETH BLANCA 1955 [id number]                 Emergency Contacts      (Rel.) Home Phone Work Phone Mobile Phone    Nate Ramos (Son) [phone] -- --    HalMilana etienne (Daughter) [phone] -- --               History & Physical      Hazel Fan MD at 02/03/21 1505              HISTORY AND PHYSICAL      Patient Care Team:  Hazel Fan MD as PCP - General  Hazel Fan MD as PCP - Family Medicine    CHIEF COMPLAINT: Diarrhea weakness     HISTORY OF PRESENT ILLNESS:    66-year-old white female with " history of diabetes hypertension hyperlipidemia chronic kidney disease who became ill a week ago with nausea 1 episode of vomiting and fatigue.  She had several episodes of diarrhea was seen in urgent care diagnosed with a viral infection was Covid negative.  Patient continued feeling extremely fatigued and worn out  throughout the week with intermittent episodes of diarrhea was seen in my office on 2/1/2021.  Exam was unremarkable but labs done in the office showed worsening renal functions with creatinine 1.6 baseline being between 1.2-1.3 and BUN elevated to 50 elevated liver enzymes are marked elevated alk phos..  She denies any abdominal pain vomiting.  Initial plan for with the patient to get some IV fluids and be discharged home but her white count came back 17,000 and CT of the abdomen and ultrasound gallbladder showed possible gangrenous cholecystitis.  Patient is being admitted for further evaluation and treatment.  She has no abdominal symptoms consistent with chronic cholecystits or cholelithiasis.    Past Medical History:   Diagnosis Date   • Arthritis    • Diabetes mellitus (CMS/HCC)    • Gout    • Hyperlipidemia    • Hypertension      Past Surgical History:   Procedure Laterality Date   • HERNIA REPAIR     • HYSTERECTOMY       Family History   Problem Relation Age of Onset   • Breast cancer Neg Hx      Social History     Tobacco Use   • Smoking status: Never Smoker   • Smokeless tobacco: Never Used   Substance Use Topics   • Alcohol use: Never     Frequency: Never   • Drug use: Never     Medications Prior to Admission   Medication Sig Dispense Refill Last Dose   • allopurinol (ZYLOPRIM) 100 MG tablet Take 100 mg by mouth 2 (Two) Times a Day.   2/2/2021 at Unknown time   • atenolol-chlorthalidone (TENORETIC) 50-25 MG per tablet Take 1 tablet by mouth Daily.   2/2/2021 at Unknown time   • atorvastatin (LIPITOR) 20 MG tablet Take  by mouth Daily.   2/2/2021 at Unknown time   • glipizide (GLUCOTROL XL) 10  "MG 24 hr tablet Take 10 mg by mouth Daily.   2/2/2021 at Unknown time   • lisinopril (PRINIVIL,ZESTRIL) 2.5 MG tablet Take 2.5 mg by mouth Daily.   2/2/2021 at Unknown time   • omeprazole (priLOSEC) 20 MG capsule Take 20 mg by mouth Daily.   2/2/2021 at Unknown time   • predniSONE (DELTASONE) 1 MG tablet Take  by mouth Daily.   2/2/2021 at Unknown time   • pregabalin (LYRICA) 75 MG capsule Take 75 mg by mouth every night at bedtime.   2/2/2021 at Unknown time   • Tresiba FlexTouch 200 UNIT/ML solution pen-injector pen injection INJECT 25 UNITS UNDER SKIN EVERY DAY.   2/2/2021 at Unknown time   • clobetasol (TEMOVATE) 0.05 % cream APPLY THIN LAYER TO SCALP AND RIGHT FOREARM TWICE DAILY AS NEEDED   Unknown at Unknown time     Allergies:  Patient has no known allergies.     Review of Systems   Constitutional: Positive for fatigue. Negative for activity change and appetite change.   HENT: Negative for congestion.    Respiratory: Negative for cough, chest tightness, shortness of breath and wheezing.    Cardiovascular: Negative for chest pain.   Gastrointestinal: Positive for diarrhea. Negative for abdominal distention, abdominal pain, nausea and vomiting.   Endocrine: Negative for polyphagia and polyuria.   Genitourinary: Negative for frequency.   Skin: Negative for rash.   Neurological: Positive for weakness (Generalized). Negative for light-headedness.   Hematological: Does not bruise/bleed easily.   Psychiatric/Behavioral: Negative for agitation and behavioral problems.       Vital Signs  Temp:  [97.8 °F (36.6 °C)-97.9 °F (36.6 °C)] 97.9 °F (36.6 °C)  Heart Rate:  [68-72] 72  Resp:  [16-18] 18  BP: ()/(62-63) 125/63  Oxygen Therapy  SpO2: 100 %  Device (Oxygen Therapy): room air}  Body mass index is 34.76 kg/m².  Flowsheet Rows      First Filed Value   Admission Height  152.4 cm (60\") Documented at 02/03/2021 1330   Admission Weight  80.7 kg (178 lb) Documented at 02/03/2021 1330                 Physical " Exam  Vitals signs and nursing note reviewed.   Constitutional:       Appearance: She is well-developed. She is obese.   HENT:      Head: Normocephalic.   Eyes:      Conjunctiva/sclera: Conjunctivae normal.   Neck:      Musculoskeletal: Normal range of motion.      Thyroid: No thyromegaly.      Vascular: No JVD.   Cardiovascular:      Rate and Rhythm: Normal rate and regular rhythm.      Heart sounds: Normal heart sounds. No murmur.   Pulmonary:      Effort: Pulmonary effort is normal. No respiratory distress.      Breath sounds: Normal breath sounds. No wheezing or rales.   Abdominal:      General: Bowel sounds are normal. There is no distension.      Palpations: Abdomen is soft.      Tenderness: There is no abdominal tenderness. There is no guarding.   Skin:     General: Skin is warm and dry.      Findings: No rash.   Neurological:      Mental Status: She is alert.          Debilities/Disabilities Identified: None    Emotional Behavior: Appropriate    Results Review:    I reviewed the patient's new clinical results.  Lab Results (most recent)     None          Imaging Results (Most Recent)     Procedure Component Value Units Date/Time    XR Chest 2 View [[id number]] Resulted: 02/03/21 1503     Updated: 02/03/21 1503        reviewed chest x-ray from previous admission    ECG/EMG Results (most recent)     None        Not reviewed    Assessment/Plan       1.  Acute possible gangrenous cholecystitis patient was kept n.p.o. surgical consult be obtained IV fluids IV antibiotics    2.  Intermittent episodes of diarrhea likely viral etiology GI panel C. difficile been ordered    3.  Adult-onset diabetes mellitus insulin-dependent controlled continue with Accu-Chek sliding cell insulin    4.  Hypertension nothing acute home medication will be continued      5.  Chronic kidney stage III recent mild elevation creatinine back to baseline at this time will monitor    6.  History of pulmonary sarcoidosis in remission continue  with low-dose steroids      7.  Hyperuricemia nothing acute resume allopurinol once taking p.o.    8.  DVT prophylaxis SCD      I discussed the patients findings and my recommendations with patient and and Dr. Ricki Fan MD  02/03/21  15:05 EST    Much of this encounter note is an electronic transcription/translation of spoken language to printed text using Dragon Software      Electronically signed by Hazel Fan MD at 02/04/21 0729       Vital Signs (last day)     Date/Time   Temp   Temp src   Pulse   Resp   BP   Patient Position   SpO2    02/04/21 1135   98.9 (37.2)   Oral   79   20   126/66   Lying   97    02/04/21 0500   97.9 (36.6)   Oral   73   18   127/68   Lying   95    02/03/21 2253   97.6 (36.4)   Oral   --   --   --   --   --    02/03/21 2057   (!) 100.6 (38.1)   Oral   77   18   144/71   Lying   98    02/03/21 1535   97.6 (36.4)   Oral   68   18   120/61   Lying   100    02/03/21 1412   --   --   --   --   --   --   100    02/03/21 1330   97.9 (36.6)   Oral   72   18   125/63   Sitting   (!) 84              Lines, Drains & Airways    Active LDAs     Name:   Placement date:   Placement time:   Site:   Days:    Peripheral IV 02/03/21 1025 Left Antecubital   02/03/21    1025    Antecubital   1         Inactive LDAs     None                  Facility-Administered Medications as of 2/4/2021   Medication Dose Route Frequency Provider Last Rate Last Admin   • acetaminophen (TYLENOL) tablet 650 mg  650 mg Oral Q4H PRN Hazel Fan MD   650 mg at 02/03/21 2143    Or   • acetaminophen (TYLENOL) 160 MG/5ML solution 650 mg  650 mg Oral Q4H PRN Hazel Fan MD        Or   • acetaminophen (TYLENOL) suppository 650 mg  650 mg Rectal Q4H PRN Hazel Fan MD       • allopurinol (ZYLOPRIM) tablet 100 mg  100 mg Oral BID Hazel Fan MD       • atorvastatin (LIPITOR) tablet 20 mg  20 mg Oral Daily Hazel Fan MD    20 mg at 02/03/21 1733   • cefTRIAXone (ROCEPHIN) IVPB 1 g/50ml dextrose (premix)  1 g Intravenous Q24H Hazel Fan  mL/hr at 02/03/21 1731 1 g at 02/03/21 1731   • dextrose (D50W) 25 g/ 50mL Intravenous Solution 25 g  25 g Intravenous Q15 Min PRN Hazel Fan MD       • dextrose (GLUTOSE) oral gel 15 g  15 g Oral Q15 Min PRN Hazel Fan MD       • [COMPLETED] diatrizoate meglumine-sodium (GASTROGRAFIN) 66-10 % solution 30 mL  30 mL Oral Once Hazel Fan MD   30 mL at 02/03/21 1400   • glucagon (GLUCAGEN) injection 1 mg  1 mg Subcutaneous Q15 Min PRN Hazel Fan MD       • insulin aspart (novoLOG) injection 0-7 Units  0-7 Units Subcutaneous TID AC Hazel Fan MD   2 Units at 02/03/21 1733   • lactated ringers infusion  100 mL/hr Intravenous Continuous Hazel Fan  mL/hr at 02/04/21 0415 100 mL/hr at 02/04/21 0415   • ondansetron (ZOFRAN) tablet 4 mg  4 mg Oral Q6H PRN Hazel Fan MD        Or   • ondansetron (ZOFRAN) injection 4 mg  4 mg Intravenous Q6H PRN Hazel Fan MD       • pantoprazole (PROTONIX) EC tablet 40 mg  40 mg Oral QAM Hazel Fan MD   40 mg at 02/04/21 0616   • predniSONE (DELTASONE) tablet 1 mg  1 mg Oral Daily Hazel Fan MD       • pregabalin (LYRICA) capsule 75 mg  75 mg Oral Q12H Hazel Fan MD   75 mg at 02/03/21 2143   • sodium chloride 0.9 % flush 1-10 mL  1-10 mL Intravenous PRN Hazel Fan MD       • sodium chloride 0.9 % flush 10 mL  10 mL Intravenous Q12H Hazel Fan MD       • sodium chloride 0.9 % flush 10 mL  10 mL Intravenous PRN Hazel Fan MD       • sodium chloride 0.9 % flush 10 mL  10 mL Intravenous Q12H Hazel Fan MD       • sodium chloride 0.9 % infusion 40 mL  40 mL Intravenous PRN Hazel Fan MD       •  [COMPLETED] sodium chloride 0.9 % infusion  150 mL/hr Intravenous Once Hazel Fan  mL/hr at 02/03/21 1204 150 mL/hr at 02/03/21 1204     Blood Administration Record (From admission, onward)    None          Lab Results (last 24 hours)     Procedure Component Value Units Date/Time    POC Glucose Once [[id number]]  (Normal) Collected: 02/04/21 1220    Specimen: Blood Updated: 02/04/21 1235     Glucose 119 mg/dL     Clostridium Difficile Toxin - Stool, Per Rectum [[id number]]  (Normal) Collected: 02/04/21 0959    Specimen: Stool from Per Rectum Updated: 02/04/21 1116    Narrative:      The following orders were created for panel order Clostridium Difficile Toxin - Stool, Per Rectum.  Procedure                               Abnormality         Status                     ---------                               -----------         ------                     Clostridium Difficile EI...[[id number]]  Normal              Final result                 Please view results for these tests on the individual orders.    Clostridium Difficile EIA - Stool, Per Rectum [[id number]]  (Normal) Collected: 02/04/21 0959    Specimen: Stool from Per Rectum Updated: 02/04/21 1116     C Diff GDH / Toxin Negative    POC Glucose Once [[id number]]  (Normal) Collected: 02/04/21 0717    Specimen: Blood Updated: 02/04/21 0724     Glucose 116 mg/dL     Hepatic Function Panel [[id number]]  (Abnormal) Collected: 02/04/21 0351    Specimen: Blood Updated: 02/04/21 0721     Total Protein 7.7 g/dL      Albumin 3.00 g/dL      ALT (SGPT) 45 U/L      AST (SGOT) 45 U/L      Alkaline Phosphatase 455 U/L      Total Bilirubin 0.6 mg/dL      Bilirubin, Direct 0.2 mg/dL      Bilirubin, Indirect 0.4 mg/dL     Basic Metabolic Panel [[id number]]  (Abnormal) Collected: 02/04/21 0351    Specimen: Blood Updated: 02/04/21 0620     Glucose 116 mg/dL      BUN 24 mg/dL      Creatinine 1.16 mg/dL      Sodium 136 mmol/L      Potassium 3.8 mmol/L      Chloride 94  mmol/L      CO2 30.5 mmol/L      Calcium 9.5 mg/dL      eGFR Non African Amer 47 mL/min/1.73      BUN/Creatinine Ratio 20.7     Anion Gap 11.5 mmol/L     Narrative:      GFR Normal >60  Chronic Kidney Disease <60  Kidney Failure <15      CBC (No Diff) [[id number]]  (Abnormal) Collected: 02/04/21 0351    Specimen: Blood Updated: 02/04/21 0450     WBC 14.88 10*3/mm3      RBC 3.63 10*6/mm3      Hemoglobin 10.6 g/dL      Hematocrit 33.4 %      MCV 92.0 fL      MCH 29.2 pg      MCHC 31.7 g/dL      RDW 13.1 %      RDW-SD 44.0 fl      MPV 9.4 fL      Platelets 430 10*3/mm3     POC Glucose Once [[id number]]  (Normal) Collected: 02/04/21 0005    Specimen: Blood Updated: 02/04/21 0011     Glucose 126 mg/dL     COVID PRE-OP / PRE-PROCEDURE SCREENING ORDER (NO ISOLATION) - Swab, Nasal Cavity [[id number]]  (Normal) Collected: 02/03/21 2146    Specimen: Swab from Nasal Cavity Updated: 02/03/21 2237    Narrative:      The following orders were created for panel order COVID PRE-OP / PRE-PROCEDURE SCREENING ORDER (NO ISOLATION) - Swab, Nasal Cavity.  Procedure                               Abnormality         Status                     ---------                               -----------         ------                     COVID-19,[facility] IN-NAYELI...[[id number]]  Normal              Final result                 Please view results for these tests on the individual orders.    COVID-19,[facility] IN-HOUSE,Nasal Swab No Transport Media 3-4 HR TAT - Swab, Nasal Cavity [[id number]]  (Normal) Collected: 02/03/21 2146    Specimen: Swab from Nasal Cavity Updated: 02/03/21 2237     COVID19 Not Detected    Narrative:      Fact sheet for providers: https://www.fda.gov/media/007129/download     Fact sheet for patients: https://www.fda.gov/media/887304/download    Test performed by PCR.    Consider negative results in combination with clinical observations, patient history, and epidemiological information.    Gastrointestinal Panel, PCR - Stool, Per  Rectum [[id number]]  (Normal) Collected: 02/03/21 1627    Specimen: Stool from Per Rectum Updated: 02/03/21 2058     Campylobacter Not Detected     Plesiomonas shigelloides Not Detected     Salmonella Not Detected     Vibrio Not Detected     Vibrio cholerae Not Detected     Yersinia enterocolitica Not Detected     Enteroaggregative E. coli (EAEC) Not Detected     Enteropathogenic E. coli (EPEC) Not Detected     Enterotoxigenic E. coli (ETEC) lt/st Not Detected     Shiga-like toxin-producing E. coli (STEC) stx1/stx2 Not Detected     Shigella/Enteroinvasive E. coli (EIEC) Not Detected     Cryptosporidium Not Detected     Cyclospora cayetanensis Not Detected     Entamoeba histolytica Not Detected     Giardia lamblia Not Detected     Adenovirus F40/41 Not Detected     Astrovirus Not Detected     Norovirus GI/GII Not Detected     Rotavirus A Not Detected     Sapovirus (I, II, IV or V) Not Detected    Narrative:      If Aeromonas, Staphylococcus aureus or Bacillus cereus are suspected, please order [id number]: Stool Culture, Aeromonas, S aureus, B Cereus.    POC Glucose Once [[id number]]  (Abnormal) Collected: 02/03/21 1629    Specimen: Blood Updated: 02/03/21 1643     Glucose 150 mg/dL     Lactic Acid, Plasma [[id number]]  (Normal) Collected: 02/03/21 1540    Specimen: Blood Updated: 02/03/21 1606     Lactate 1.0 mmol/L         Imaging Results (Last 24 Hours)     Procedure Component Value Units Date/Time    XR Chest 2 View [[id number]] Collected: 02/03/21 1521     Updated: 02/03/21 1523    Narrative:      CR Chest 2 Vws    INDICATION:    66-year-old female with shortness of air for several weeks.    COMPARISON:    Chest 8/9/2013    FINDINGS:   PA and lateral views of the chest.  Heart and mediastinal contours are normal. The lungs are clear. No pneumothorax or pleural effusion.        Impression:      No acute cardiopulmonary findings.    Signer Name: Sonu Bautista MD   Signed: 2/3/2021 3:21 PM   Workstation Name: [email or web address]     "[facility]        ECG/EMG Results (last 24 hours)     ** No results found for the last 24 hours. **        Ventilator/Non-Invasive Ventilation Settings (From admission, onward)    None        Operative/Procedure Notes (last 24 hours) (Notes from 02/03/21 1325 through 02/04/21 1325)    No notes of this type exist for this encounter.            Physician Progress Notes (last 24 hours) (Notes from 02/03/21 1325 through 02/04/21 1325)      Hazel Fan MD at 02/04/21 0730          Daily Progress Note:      Chief complaint: Follow-up of cholecystitis, hypertension, chronic kidney disease, diarrhea    Subjective: Continues to have intermittent diarrhea.  Patient does complain of some mild right upper quadrant abdominal pain since admission.  No nausea vomiting     LOS: 1 day     Vital Signs  Temp:  [97.6 °F (36.4 °C)-100.6 °F (38.1 °C)] 97.9 °F (36.6 °C)  Heart Rate:  [68-77] 73  Resp:  [16-18] 18  BP: ()/(61-71) 127/68  Oxygen Therapy  SpO2: 95 %  Device (Oxygen Therapy): room air}  Body mass index is 34.76 kg/m².  Flowsheet Rows      First Filed Value   Admission Height  152.4 cm (60\") Documented at 02/03/2021 1330   Admission Weight  80.7 kg (178 lb) Documented at 02/03/2021 1330                   Documented weights    02/03/21 1330   Weight: 80.7 kg (178 lb)           Patient Vitals for the past 24 hrs:   BP Temp Temp src Pulse Resp SpO2 Height Weight   02/04/21 0500 127/68 97.9 °F (36.6 °C) Oral 73 18 95 % -- --   02/03/21 2253 -- 97.6 °F (36.4 °C) Oral -- -- -- -- --   02/03/21 2057 144/71 (!) 100.6 °F (38.1 °C) Oral 77 18 98 % -- --   02/03/21 1535 120/61 97.6 °F (36.4 °C) Oral 68 18 100 % -- --   02/03/21 1412 -- -- -- -- -- 100 % -- --   02/03/21 1330 125/63 97.9 °F (36.6 °C) Oral 72 18 (!) 84 % 152.4 cm (60\") 80.7 kg (178 lb)       80.7 kg (178 lb)      Intake/Output Summary (Last 24 hours) at 2/4/2021 0730  Last data filed at 2/3/2021 1841  Gross per 24 hour   Intake " 1450 ml   Output 100 ml   Net 1350 ml       Review of Systems   Constitutional: Positive for fatigue. Negative for activity change and appetite change.   HENT: Negative for congestion.    Respiratory: Negative for cough, chest tightness, shortness of breath and wheezing.    Cardiovascular: Negative for chest pain.   Gastrointestinal: Positive for abdominal pain and diarrhea. Negative for abdominal distention, nausea and vomiting.   Endocrine: Negative for polyphagia and polyuria.   Genitourinary: Negative for frequency.   Skin: Negative for rash.   Neurological: Positive for weakness. Negative for light-headedness.   Hematological: Does not bruise/bleed easily.   Psychiatric/Behavioral: Negative for agitation and behavioral problems.       Physical Exam  Vitals signs and nursing note reviewed.   Constitutional:       Appearance: She is well-developed. She is obese.   HENT:      Head: Normocephalic.   Eyes:      Conjunctiva/sclera: Conjunctivae normal.   Neck:      Musculoskeletal: Normal range of motion.      Thyroid: No thyromegaly.      Vascular: No JVD.   Cardiovascular:      Rate and Rhythm: Normal rate and regular rhythm.      Heart sounds: Normal heart sounds. No murmur.   Pulmonary:      Effort: Pulmonary effort is normal. No respiratory distress.      Breath sounds: Normal breath sounds. No wheezing or rales.   Abdominal:      General: Bowel sounds are normal. There is no distension.      Palpations: Abdomen is soft.      Tenderness: There is abdominal tenderness in the right upper quadrant. There is no guarding.   Skin:     General: Skin is warm and dry.      Findings: No rash.   Neurological:      Mental Status: She is alert.         Medication Review:   I have reviewed the patient's current medication list  Scheduled Meds:allopurinol, 100 mg, Oral, BID  atorvastatin, 20 mg, Oral, Daily  cefTRIAXone, 1 g, Intravenous, Q24H  insulin aspart, 0-7 Units, Subcutaneous, TID AC  pantoprazole, 40 mg, Oral,  QAM  predniSONE, 1 mg, Oral, Daily  pregabalin, 75 mg, Oral, Q12H  sodium chloride, 10 mL, Intravenous, Q12H  sodium chloride, 10 mL, Intravenous, Q12H      Continuous Infusions:lactated ringers, 100 mL/hr, Last Rate: 100 mL/hr (02/04/21 0415)      PRN Meds:.•  acetaminophen **OR** acetaminophen **OR** acetaminophen  •  dextrose  •  dextrose  •  glucagon (human recombinant)  •  ondansetron **OR** ondansetron  •  sodium chloride  •  sodium chloride  •  sodium chloride      Labs:  Results from last 7 days   Lab Units 02/04/21  0351 02/03/21  1032   WBC 10*3/mm3 14.88* 17.74*   HEMOGLOBIN g/dL 10.6* 11.7*   HEMATOCRIT % 33.4* 36.4   PLATELETS 10*3/mm3 430 519*     Results from last 7 days   Lab Units 02/04/21  0351 02/03/21  1032   SODIUM mmol/L 136 135*   POTASSIUM mmol/L 3.8 3.6   CHLORIDE mmol/L 94* 93*   CO2 mmol/L 30.5* 29.5*   BUN mg/dL 24* 29*   CREATININE mg/dL 1.16* 1.40*   CALCIUM mg/dL 9.5 10.0   BILIRUBIN mg/dL 0.6 0.6   ALK PHOS U/L 455* 487*   ALT (SGPT) U/L 45* 43*   AST (SGOT) U/L 45* 39*   GLUCOSE mg/dL 116* 158*           Results from last 7 days   Lab Units 02/04/21  0351 02/03/21  1540 02/03/21  1032   AST (SGOT) U/L 45*  --  39*   ALT (SGPT) U/L 45*  --  43*   LACTATE mmol/L  --  1.0  --    PLATELETS 10*3/mm3 430  --  519*         Lab Results (last 24 hours)     Procedure Component Value Units Date/Time    POC Glucose Once [[id number]]  (Normal) Collected: 02/04/21 0717    Specimen: Blood Updated: 02/04/21 0724     Glucose 116 mg/dL     Hepatic Function Panel [[id number]]  (Abnormal) Collected: 02/04/21 0351    Specimen: Blood Updated: 02/04/21 0721     Total Protein 7.7 g/dL      Albumin 3.00 g/dL      ALT (SGPT) 45 U/L      AST (SGOT) 45 U/L      Alkaline Phosphatase 455 U/L      Total Bilirubin 0.6 mg/dL      Bilirubin, Direct 0.2 mg/dL      Bilirubin, Indirect 0.4 mg/dL     Basic Metabolic Panel [[id number]]  (Abnormal) Collected: 02/04/21 0351    Specimen: Blood Updated: 02/04/21 0620     Glucose  116 mg/dL      BUN 24 mg/dL      Creatinine 1.16 mg/dL      Sodium 136 mmol/L      Potassium 3.8 mmol/L      Chloride 94 mmol/L      CO2 30.5 mmol/L      Calcium 9.5 mg/dL      eGFR Non African Amer 47 mL/min/1.73      BUN/Creatinine Ratio 20.7     Anion Gap 11.5 mmol/L     Narrative:      GFR Normal >60  Chronic Kidney Disease <60  Kidney Failure <15      CBC (No Diff) [[id number]]  (Abnormal) Collected: 02/04/21 0351    Specimen: Blood Updated: 02/04/21 0450     WBC 14.88 10*3/mm3      RBC 3.63 10*6/mm3      Hemoglobin 10.6 g/dL      Hematocrit 33.4 %      MCV 92.0 fL      MCH 29.2 pg      MCHC 31.7 g/dL      RDW 13.1 %      RDW-SD 44.0 fl      MPV 9.4 fL      Platelets 430 10*3/mm3     POC Glucose Once [[id number]]  (Normal) Collected: 02/04/21 0005    Specimen: Blood Updated: 02/04/21 0011     Glucose 126 mg/dL     COVID PRE-OP / PRE-PROCEDURE SCREENING ORDER (NO ISOLATION) - Swab, Nasal Cavity [[id number]]  (Normal) Collected: 02/03/21 2146    Specimen: Swab from Nasal Cavity Updated: 02/03/21 2237    Narrative:      The following orders were created for panel order COVID PRE-OP / PRE-PROCEDURE SCREENING ORDER (NO ISOLATION) - Swab, Nasal Cavity.  Procedure                               Abnormality         Status                     ---------                               -----------         ------                     COVID-19,[facility] IN-NAYELI...[[id number]]  Normal              Final result                 Please view results for these tests on the individual orders.    COVID-19,[facility] IN-HOUSE,Nasal Swab No Transport Media 3-4 HR TAT - Swab, Nasal Cavity [[id number]]  (Normal) Collected: 02/03/21 2146    Specimen: Swab from Nasal Cavity Updated: 02/03/21 2237     COVID19 Not Detected    Narrative:      Fact sheet for providers: https://www.fda.gov/media/943154/download     Fact sheet for patients: https://www.fda.gov/media/159695/download    Test performed by PCR.    Consider negative results in combination with  clinical observations, patient history, and epidemiological information.    Gastrointestinal Panel, PCR - Stool, Per Rectum [[id number]]  (Normal) Collected: 02/03/21 1627    Specimen: Stool from Per Rectum Updated: 02/03/21 2058     Campylobacter Not Detected     Plesiomonas shigelloides Not Detected     Salmonella Not Detected     Vibrio Not Detected     Vibrio cholerae Not Detected     Yersinia enterocolitica Not Detected     Enteroaggregative E. coli (EAEC) Not Detected     Enteropathogenic E. coli (EPEC) Not Detected     Enterotoxigenic E. coli (ETEC) lt/st Not Detected     Shiga-like toxin-producing E. coli (STEC) stx1/stx2 Not Detected     Shigella/Enteroinvasive E. coli (EIEC) Not Detected     Cryptosporidium Not Detected     Cyclospora cayetanensis Not Detected     Entamoeba histolytica Not Detected     Giardia lamblia Not Detected     Adenovirus F40/41 Not Detected     Astrovirus Not Detected     Norovirus GI/GII Not Detected     Rotavirus A Not Detected     Sapovirus (I, II, IV or V) Not Detected    Narrative:      If Aeromonas, Staphylococcus aureus or Bacillus cereus are suspected, please order [id number]: Stool Culture, Aeromonas, S aureus, B Cereus.    POC Glucose Once [[id number]]  (Abnormal) Collected: 02/03/21 1629    Specimen: Blood Updated: 02/03/21 1643     Glucose 150 mg/dL     Lactic Acid, Plasma [[id number]]  (Normal) Collected: 02/03/21 1540    Specimen: Blood Updated: 02/03/21 1606     Lactate 1.0 mmol/L                                         Glucose   Date/Time Value Ref Range Status   02/04/2021 0717 116 70 - 130 mg/dL Final   02/04/2021 0005 126 70 - 130 mg/dL Final   02/03/2021 1629 150 (H) 70 - 130 mg/dL Final     Results from last 7 days   Lab Units 02/03/21  1540   LACTATE mmol/L 1.0             Results from last 7 days   Lab Units 02/03/21  1627   ADENOVIRUS  Not Detected         Radiology:  Imaging Results (Last 24 Hours)     Procedure Component Value Units Date/Time    XR Chest 2 View  [[id number]] Collected: 02/03/21 1521     Updated: 02/03/21 1523    Narrative:      CR Chest 2 Vws    INDICATION:    66-year-old female with shortness of air for several weeks.    COMPARISON:    Chest 8/9/2013    FINDINGS:   PA and lateral views of the chest.  Heart and mediastinal contours are normal. The lungs are clear. No pneumothorax or pleural effusion.        Impression:      No acute cardiopulmonary findings.    Signer Name: Sonu Bautista MD   Signed: 2/3/2021 3:21 PM   Workstation Name: [id number]    [facility]          Cardiology:  ECG/EMG Results (last 24 hours)     ** No results found for the last 24 hours. **          I have reviewed recent labs results and consult notes.  Parts of this note may have been copied and pasted but patient was examined and interviewed by me today    Assessment and Plan:  1.  Acute possible gangrenous cholecystitis white count is improved now with some abdominal discomfort await surgical evaluation     2.  Intermittent episodes of diarrhea likely viral etiology GI panel C. difficile been ordered     3.  Adult-onset diabetes mellitus insulin-dependent controlled continue with Accu-Chek sliding cell insulin     4.  Hypertension nothing acute home medication will be continued        5.  Chronic kidney stage III recent mild elevation creatinine back to baseline at this time will monitor     6.  History of pulmonary sarcoidosis in remission continue with low-dose steroids        7.  Hyperuricemia nothing acute resume allopurinol once taking p.o.     8.  DVT prophylaxis SCD    Much of this encounter note is an electronic transcription/translation of spoken language to printed text using Dragon Software            Electronically signed by Hazel Fan MD at 02/04/21 0732          Consult Notes (last 24 hours) (Notes from 02/03/21 1325 through 02/04/21 1325)      Andie Robison MD at 02/04/21 0821          CC:  Diarrhea     Patient is a 66 y.o.  female referred by Hazel Fan MD for evaluation of gangrenous cholecystitis.  Patient had started having problems about a week ago with an episode of nausea and vomiting x1.  Then she began having intermittent diarrhea.  Patient had been evaluated at urgent care center and was Covid negative.  Patient then presented to Dr. Fan for continued diarrhea and fatigue.  Patient underwent CT scan that revealed an gangrenous cholecystitis with elevated white count of 17.  Patient denied any abdominal pain.  Patient denies jaundice, edward colored stools or dark urine.  Patient denies fever or chills.    Past Medical History:   Diagnosis Date   • Arthritis    • Diabetes mellitus (CMS/HCC)    • Gout    • Hyperlipidemia    • Hypertension      Past Surgical History:   Procedure Laterality Date   • HERNIA REPAIR     • HYSTERECTOMY       Family History   Problem Relation Age of Onset   • Breast cancer Neg Hx      Social History     Tobacco Use   • Smoking status: Never Smoker   • Smokeless tobacco: Never Used   Substance Use Topics   • Alcohol use: Never     Frequency: Never   • Drug use: Never     No Known Allergies    Current Facility-Administered Medications:   •  acetaminophen (TYLENOL) tablet 650 mg, 650 mg, Oral, Q4H PRN, 650 mg at 02/03/21 2143 **OR** acetaminophen (TYLENOL) 160 MG/5ML solution 650 mg, 650 mg, Oral, Q4H PRN **OR** acetaminophen (TYLENOL) suppository 650 mg, 650 mg, Rectal, Q4H PRN, Hazel Fan MD  •  allopurinol (ZYLOPRIM) tablet 100 mg, 100 mg, Oral, BID, Hazel Fan MD  •  atorvastatin (LIPITOR) tablet 20 mg, 20 mg, Oral, Daily, Hazel Fan MD, 20 mg at 02/03/21 1733  •  cefTRIAXone (ROCEPHIN) IVPB 1 g/50ml dextrose (premix), 1 g, Intravenous, Q24H, aHzel Fan MD, Last Rate: 100 mL/hr at 02/03/21 1731, 1 g at 02/03/21 1731  •  dextrose (D50W) 25 g/ 50mL Intravenous Solution 25 g, 25 g, Intravenous, Q15 Min PRN, Meng  MD Haezl  •  dextrose (GLUTOSE) oral gel 15 g, 15 g, Oral, Q15 Min PRN, Hazel Fan MD  •  glucagon (GLUCAGEN) injection 1 mg, 1 mg, Subcutaneous, Q15 Min PRN, Hazel Fan MD  •  insulin aspart (novoLOG) injection 0-7 Units, 0-7 Units, Subcutaneous, TID AC, Hazel Fan MD, 2 Units at 02/03/21 1733  •  lactated ringers infusion, 100 mL/hr, Intravenous, Continuous, Hazel Fan MD, Last Rate: 100 mL/hr at 02/04/21 0415, 100 mL/hr at 02/04/21 0415  •  ondansetron (ZOFRAN) tablet 4 mg, 4 mg, Oral, Q6H PRN **OR** ondansetron (ZOFRAN) injection 4 mg, 4 mg, Intravenous, Q6H PRN, Hazel Fan MD  •  pantoprazole (PROTONIX) EC tablet 40 mg, 40 mg, Oral, QAM, Hazel Fan MD, 40 mg at 02/04/21 0616  •  predniSONE (DELTASONE) tablet 1 mg, 1 mg, Oral, Daily, Hazel Fan MD  •  pregabalin (LYRICA) capsule 75 mg, 75 mg, Oral, Q12H, Hazel Fan MD, 75 mg at 02/03/21 2143  •  sodium chloride 0.9 % flush 1-10 mL, 1-10 mL, Intravenous, PRN, Hazel Fan MD  •  sodium chloride 0.9 % flush 10 mL, 10 mL, Intravenous, Q12H, Hazel Fan MD  •  sodium chloride 0.9 % flush 10 mL, 10 mL, Intravenous, PRN, Hazel Fan MD  •  sodium chloride 0.9 % flush 10 mL, 10 mL, Intravenous, Q12H, Hazel Fan MD  •  sodium chloride 0.9 % infusion 40 mL, 40 mL, Intravenous, PRN, Hazel Fan MD    Review of Systems  General: Patient reports fatigue  Eyes: Wears corrective lenses  Ears, nose, mouth and throat: No rhinitis, no hearing problems, no chronic cough  Cardiovascular/heart: Denies palpitations, syncope or chest pain  Respiratory/lung: Denies shortness of breath, hemoptysis, dyspnea on exertion   Genital/urinary: No frequency, hematuria or dysuria  Hematological/lymphatic: Denies anemia or other problems  Musculoskeletal: No joint pain, no defects  Skin:  No psoriasis or other skin issues  Neurological: No seizures or other neurological problems  Psychiatric: None  Endocrine: Negative  Gastro-intestinal: No constipation, positive diarrhea, no melena, no hematochezia  Vitals:    02/03/21 1535 02/03/21 2057 02/03/21 2253 02/04/21 0500   BP: 120/61 144/71  127/68   BP Location: Right arm Right arm  Right arm   Patient Position: Lying Lying  Lying   Pulse: 68 77  73   Resp: 18 18  18   Temp: 97.6 °F (36.4 °C) (!) 100.6 °F (38.1 °C) 97.6 °F (36.4 °C) 97.9 °F (36.6 °C)   TempSrc: Oral Oral Oral Oral   SpO2: 100% 98%  95%   Weight:       Height:           Physical Exam  General/physcological:   Alert and oriented x3, in no acute distress  HEENT: Normal cephalic, atraumatic, PERRLA, EOMI, sclera anicteric, moist mucous membranes, neck is supple, no JVD, no carotid bruits, no thyromegaly no adenopathy  Respiratory: CTA and percussion  CVA: RRR, normal S1-S2, no murmurs, no gallops or rubs  GI: Positive BS, soft, nondistended, nontender, no rebound, no guarding, no hernias, no organomegaly and no masses  Musculoskeletal: Moves all 4 ext, no clubbing, no cyanosis or edema  Neurovascular: Grossly intact  Debilities: none  Emotional behavior: appropriate   Results from last 7 days   Lab Units 02/04/21  0351   WBC 10*3/mm3 14.88*   HEMOGLOBIN g/dL 10.6*   HEMATOCRIT % 33.4*   PLATELETS 10*3/mm3 430     Results from last 7 days   Lab Units 02/04/21  0351   SODIUM mmol/L 136   POTASSIUM mmol/L 3.8   CHLORIDE mmol/L 94*   CO2 mmol/L 30.5*   BUN mg/dL 24*   CREATININE mg/dL 1.16*   CALCIUM mg/dL 9.5   BILIRUBIN mg/dL 0.6   ALK PHOS U/L 455*   ALT (SGPT) U/L 45*   AST (SGOT) U/L 45*   GLUCOSE mg/dL 116*     CT scan was reviewed which reveals grossly dilated thickened inflamed gallbladder    Assessment:  Acute gangrenous cholecystitis  Plan:  I have discussed this diagnosis with the patient and advised the patient that she should undergo a laparoscopic cholecystectomy and intraoperative  cholangiogram.  I have advised the patient that sometimes when the gallbladder is are gangrenous we do have to convert to an open cholecystectomy.  I have discussed the procedures in detail.  I have discussed the risks, benefits and alternatives.  I have discussed the risk of anesthesia, bleeding, infection, bowel injuries and common bile duct injuries.  Patient understands these risks, benefits and alternatives and wishes to proceed.    Andie Robison MD  General, Minimally Invasive and Endoscopic Surgery  [facility]      [address] [facility] [address] [place] [address]    [address]  [address] [address]    P: [phone]  F: [phone]    Cc:  Hazel Fan MD        Electronically signed by Andie Robison MD at 02/04/21 9333

## 2021-02-04 NOTE — PROGRESS NOTES
Adult Nutrition  Assessment/PES    Patient Name:  Lisbeth Lucas  YOB: 1955  MRN: [id number]  Admit Date:  2/3/2021    Assessment Date:  2/4/2021    Comments:  Advance diet as indicated post op. Will remain available and cont to follow.    Reason for Assessment     Row Name 02/04/21 1138          Reason for Assessment    Reason For Assessment  identified at risk by screening criteria     Diagnosis  surgery/postoperative complications gangenerous cholecytitis hx DM HTN CKD     Identified At Risk by Screening Criteria  MST SCORE 2+         Nutrition/Diet History     Row Name 02/04/21 1139          Nutrition/Diet History    Typical Food/Fluid Intake  Pt to have surgery this afternoon per RN.         Anthropometrics     Row Name 02/04/21 1139          Anthropometrics    Weight  -- 178#        Body Mass Index (BMI)    BMI Assessment  BMI 30-34.9: obesity grade I         Labs/Tests/Procedures/Meds     Row Name 02/04/21 1139          Labs/Procedures/Meds    Lab Results Reviewed  reviewed     Lab Results Comments  ALT 45 H, BUN 24/Creat 1.1 H        Diagnostic Tests/Procedures    Diagnostic Test/Procedure Reviewed  reviewed        Medications    Pertinent Medications Reviewed  reviewed     Pertinent Medications Comments  novolog, prednisone           Estimated/Assessed Needs     Row Name 02/04/21 1140          Estimated/Assessed Needs    Additional Documentation  Calorie Requirements (Group);Fluid Requirements (Group);Protein Requirements (Group)        Calorie Requirements    Estimated Calorie Need Method  Nash-[employer] Ferreira     Estimated Calorie Requirement Comment  1525 kcal ( mifflin 1.2 )        Protein Requirements    Est Protein Requirement Amount (gms/kg)  1.0 gm protein 81 gm pro        Fluid Requirements    Estimated Fluid Requirement Method  RDA Method 1525 ml         Nutrition Prescription Ordered     Row Name 02/04/21 1140          Nutrition Prescription PO    Current PO Diet  NPO          Evaluation of Received Nutrient/Fluid Intake     Row Name 02/04/21 1140          Fluid Intake Evaluation    IV Fluid (mL)  1400 92%        PO Evaluation    Number of Days PO Intake Evaluated  Insufficient Data               Problem/Interventions:  Problem 1     Row Name 02/04/21 1141          Nutrition Diagnoses Problem 1    Problem 1  Altered GI Function     Etiology (related to)  Medical Diagnosis     Signs/Symptoms (evidenced by)  Report/Observation;NPO               Intervention Goal     Row Name 02/04/21 1142          Intervention Goal    General  Meet nutritional needs for age/condition     PO  Establish PO;PO intake (%)     PO Intake %  50 % or greater         Nutrition Intervention     Row Name 02/04/21 1142          Nutrition Intervention    RD/Tech Action  Follow Tx progress           Education/Evaluation     Row Name 02/04/21 1142          Education    Education  Education not appropriate at this time        Monitor/Evaluation    Monitor  Per protocol;I&O;PO intake;Pertinent labs;Weight;Symptoms           Electronically signed by:  Izzy Rust RD  02/04/21 11:43 EST

## 2021-02-04 NOTE — ANESTHESIA PROCEDURE NOTES
Airway  Urgency: elective    Date/Time: 2/4/2021 3:21 PM  Airway not difficult    General Information and Staff    Patient location during procedure: OR  CRNA: Joe Cortes CRNA    Indications and Patient Condition  Indications for airway management: airway protection    Preoxygenated: yes  Mask difficulty assessment: 0 - not attempted    Final Airway Details  Final airway type: endotracheal airway      Successful airway: ETT  Cuffed: yes   Successful intubation technique: direct laryngoscopy  Endotracheal tube insertion site: oral  Blade: Ga  Blade size: 2  ETT size (mm): 7.0  Cormack-Lehane Classification: grade I - full view of glottis  Placement verified by: chest auscultation and capnometry   Measured from: lips  ETT/EBT  to lips (cm): 21  Number of attempts at approach: 1  Assessment: lips, teeth, and gum same as pre-op and atraumatic intubation

## 2021-02-04 NOTE — PLAN OF CARE
Goal Outcome Evaluation:  Plan of Care Reviewed With: patient  Progress: no change  Outcome Summary: Patient admitted to [facility]ked surg. Oriented to room and unit. Plan explained. All questions answered. Call light in reach. Abdominal and pelvic CT done today. Patient ambulating in room. Voiding without difficulty. Start IVFs and IV antibiotics.

## 2021-02-04 NOTE — ANESTHESIA PREPROCEDURE EVALUATION
Anesthesia Evaluation     Patient summary reviewed and Nursing notes reviewed   no history of anesthetic complications:  NPO Solid Status: > 8 hours  NPO Liquid Status: > 8 hours           Airway   Mallampati: II  TM distance: >3 FB  Neck ROM: full  No difficulty expected  Dental - normal exam     Pulmonary - negative pulmonary ROS and normal exam    breath sounds clear to auscultation  Cardiovascular - normal exam  Exercise tolerance: good (4-7 METS)    Rhythm: regular  Rate: normal    (+) hypertension, hyperlipidemia,       Neuro/Psych- negative ROS  GI/Hepatic/Renal/Endo    (+) obesity,  GERD well controlled,  diabetes mellitus type 2,     Musculoskeletal     Abdominal   (+) obese,    Substance History - negative use     OB/GYN negative ob/gyn ROS         Other   arthritis,                      Anesthesia Plan    ASA 2     general     intravenous induction     Anesthetic plan, all risks, benefits, and alternatives have been provided, discussed and informed consent has been obtained with: patient.  Use of blood products discussed with patient  Consented to blood products.

## 2021-02-05 LAB
ALBUMIN SERPL-MCNC: 2.5 G/DL (ref 3.5–5.2)
ALBUMIN/GLOB SERPL: 0.5 G/DL
ALP SERPL-CCNC: 375 U/L (ref 39–117)
ALT SERPL W P-5'-P-CCNC: 65 U/L (ref 1–33)
ANION GAP SERPL CALCULATED.3IONS-SCNC: 14.6 MMOL/L (ref 5–15)
AST SERPL-CCNC: 78 U/L (ref 1–32)
BASOPHILS # BLD AUTO: 0.1 10*3/MM3 (ref 0–0.2)
BASOPHILS NFR BLD AUTO: 0.4 % (ref 0–1.5)
BILIRUB SERPL-MCNC: 0.5 MG/DL (ref 0–1.2)
BUN SERPL-MCNC: 30 MG/DL (ref 8–23)
BUN/CREAT SERPL: 16.4 (ref 7–25)
CALCIUM SPEC-SCNC: 9 MG/DL (ref 8.6–10.5)
CHLORIDE SERPL-SCNC: 94 MMOL/L (ref 98–107)
CO2 SERPL-SCNC: 25.4 MMOL/L (ref 22–29)
CREAT SERPL-MCNC: 1.83 MG/DL (ref 0.57–1)
DEPRECATED RDW RBC AUTO: 44.2 FL (ref 37–54)
EOSINOPHIL # BLD AUTO: 0 10*3/MM3 (ref 0–0.4)
EOSINOPHIL NFR BLD AUTO: 0 % (ref 0.3–6.2)
ERYTHROCYTE [DISTWIDTH] IN BLOOD BY AUTOMATED COUNT: 13.2 % (ref 12.3–15.4)
GFR SERPL CREATININE-BSD FRML MDRD: 28 ML/MIN/1.73
GLOBULIN UR ELPH-MCNC: 4.6 GM/DL
GLUCOSE BLDC GLUCOMTR-MCNC: 147 MG/DL (ref 70–130)
GLUCOSE BLDC GLUCOMTR-MCNC: 168 MG/DL (ref 70–130)
GLUCOSE BLDC GLUCOMTR-MCNC: 199 MG/DL (ref 70–130)
GLUCOSE BLDC GLUCOMTR-MCNC: 204 MG/DL (ref 70–130)
GLUCOSE SERPL-MCNC: 193 MG/DL (ref 65–99)
HCT VFR BLD AUTO: 31.1 % (ref 34–46.6)
HGB BLD-MCNC: 10 G/DL (ref 12–15.9)
IMM GRANULOCYTES # BLD AUTO: 0.9 10*3/MM3 (ref 0–0.05)
IMM GRANULOCYTES NFR BLD AUTO: 3.2 % (ref 0–0.5)
LYMPHOCYTES # BLD AUTO: 1.38 10*3/MM3 (ref 0.7–3.1)
LYMPHOCYTES NFR BLD AUTO: 4.9 % (ref 19.6–45.3)
MCH RBC QN AUTO: 29.8 PG (ref 26.6–33)
MCHC RBC AUTO-ENTMCNC: 32.2 G/DL (ref 31.5–35.7)
MCV RBC AUTO: 92.6 FL (ref 79–97)
MONOCYTES # BLD AUTO: 1.07 10*3/MM3 (ref 0.1–0.9)
MONOCYTES NFR BLD AUTO: 3.8 % (ref 5–12)
NEUTROPHILS NFR BLD AUTO: 24.61 10*3/MM3 (ref 1.7–7)
NEUTROPHILS NFR BLD AUTO: 87.7 % (ref 42.7–76)
NRBC BLD AUTO-RTO: 0 /100 WBC (ref 0–0.2)
PLATELET # BLD AUTO: 512 10*3/MM3 (ref 140–450)
PMV BLD AUTO: 9.8 FL (ref 6–12)
POTASSIUM SERPL-SCNC: 4.8 MMOL/L (ref 3.5–5.2)
PROT SERPL-MCNC: 7.1 G/DL (ref 6–8.5)
QT INTERVAL: 373 MS
RBC # BLD AUTO: 3.36 10*6/MM3 (ref 3.77–5.28)
SODIUM SERPL-SCNC: 134 MMOL/L (ref 136–145)
WBC # BLD AUTO: 28.06 10*3/MM3 (ref 3.4–10.8)

## 2021-02-05 PROCEDURE — 25010000002 CEFTRIAXONE SODIUM-DEXTROSE 1-3.74 GM-%(50ML) RECONSTITUTED SOLUTION: Performed by: SURGERY

## 2021-02-05 PROCEDURE — 63710000001 INSULIN ASPART PER 5 UNITS: Performed by: SURGERY

## 2021-02-05 PROCEDURE — 63710000001 PREDNISONE PER 5 MG: Performed by: SURGERY

## 2021-02-05 PROCEDURE — 85025 COMPLETE CBC W/AUTO DIFF WBC: CPT | Performed by: SURGERY

## 2021-02-05 PROCEDURE — 80053 COMPREHEN METABOLIC PANEL: CPT | Performed by: SURGERY

## 2021-02-05 PROCEDURE — 99024 POSTOP FOLLOW-UP VISIT: CPT | Performed by: SURGERY

## 2021-02-05 PROCEDURE — 82962 GLUCOSE BLOOD TEST: CPT

## 2021-02-05 RX ADMIN — SODIUM CHLORIDE, POTASSIUM CHLORIDE, SODIUM LACTATE AND CALCIUM CHLORIDE 150 ML/HR: 600; 310; 30; 20 INJECTION, SOLUTION INTRAVENOUS at 15:24

## 2021-02-05 RX ADMIN — HYDROCODONE BITARTRATE AND ACETAMINOPHEN 1 TABLET: 7.5; 325 TABLET ORAL at 11:11

## 2021-02-05 RX ADMIN — PREDNISONE 1 MG: 1 TABLET ORAL at 08:25

## 2021-02-05 RX ADMIN — PREGABALIN 75 MG: 75 CAPSULE ORAL at 08:17

## 2021-02-05 RX ADMIN — INSULIN ASPART 3 UNITS: 100 INJECTION, SOLUTION INTRAVENOUS; SUBCUTANEOUS at 08:23

## 2021-02-05 RX ADMIN — HYDROCODONE BITARTRATE AND ACETAMINOPHEN 1 TABLET: 7.5; 325 TABLET ORAL at 17:37

## 2021-02-05 RX ADMIN — ALLOPURINOL 100 MG: 100 TABLET ORAL at 20:23

## 2021-02-05 RX ADMIN — SODIUM CHLORIDE, PRESERVATIVE FREE 10 ML: 5 INJECTION INTRAVENOUS at 20:24

## 2021-02-05 RX ADMIN — HYDROCODONE BITARTRATE AND ACETAMINOPHEN 1 TABLET: 7.5; 325 TABLET ORAL at 06:08

## 2021-02-05 RX ADMIN — CEFTRIAXONE 1 G: 1 INJECTION, SOLUTION INTRAVENOUS at 17:38

## 2021-02-05 RX ADMIN — PREGABALIN 75 MG: 75 CAPSULE ORAL at 20:23

## 2021-02-05 RX ADMIN — SODIUM CHLORIDE, PRESERVATIVE FREE 10 ML: 5 INJECTION INTRAVENOUS at 08:17

## 2021-02-05 RX ADMIN — PANTOPRAZOLE SODIUM 40 MG: 40 TABLET, DELAYED RELEASE ORAL at 06:08

## 2021-02-05 RX ADMIN — ALLOPURINOL 100 MG: 100 TABLET ORAL at 08:17

## 2021-02-05 RX ADMIN — HYDROCODONE BITARTRATE AND ACETAMINOPHEN 1 TABLET: 7.5; 325 TABLET ORAL at 00:18

## 2021-02-05 RX ADMIN — ATORVASTATIN CALCIUM 20 MG: 20 TABLET, FILM COATED ORAL at 08:17

## 2021-02-05 RX ADMIN — INSULIN ASPART 2 UNITS: 100 INJECTION, SOLUTION INTRAVENOUS; SUBCUTANEOUS at 12:15

## 2021-02-05 RX ADMIN — SODIUM CHLORIDE, POTASSIUM CHLORIDE, SODIUM LACTATE AND CALCIUM CHLORIDE 150 ML/HR: 600; 310; 30; 20 INJECTION, SOLUTION INTRAVENOUS at 08:17

## 2021-02-05 NOTE — PROGRESS NOTES
Chief Complaint: POD # 1    Subjective   Patient reports she has a lot of pain in the right upper quadrant and it is sore.  Patient denies any nausea or vomiting.    Objective     Vital signs in last 24 hours:  Temp:  [97.5 °F (36.4 °C)-98.8 °F (37.1 °C)] 97.5 °F (36.4 °C)  Heart Rate:  [71-91] 81  Resp:  [12-20] 18  BP: (102-140)/(60-81) 127/69    Intake/Output last 3 shifts:  I/O last 3 completed shifts:  In: 740 [P.O.:240; I.V.:500]  Out: 525 [Drains:25; Blood:500]    Intake/Output this shift:  I/O this shift:  In: 2068.5 [I.V.:2068.5]  Out: 225 [Urine:200; Drains:25]    Physical Exam:  General: Patient is alert and oriented x3 in no acute distress  Respiratory: CTA, good inspiratory effort  CV: RRR  Abd: Hypoactive BS, soft, ND, usual post-op tenderness, no rebound, no guarding, incision dressing is dry, FAMILIA dark blood 25 cc no evidence of bile leak    Results from last 7 days   Lab Units 02/05/21  0337   WBC 10*3/mm3 28.06*   HEMOGLOBIN g/dL 10.0*   HEMATOCRIT % 31.1*   PLATELETS 10*3/mm3 512*     Results from last 7 days   Lab Units 02/05/21  0337   SODIUM mmol/L 134*   POTASSIUM mmol/L 4.8   CHLORIDE mmol/L 94*   CO2 mmol/L 25.4   BUN mg/dL 30*   CREATININE mg/dL 1.83*   CALCIUM mg/dL 9.0   BILIRUBIN mg/dL 0.5   ALK PHOS U/L 375*   ALT (SGPT) U/L 65*   AST (SGOT) U/L 78*   GLUCOSE mg/dL 193*       Assessment/Plan   S/P laparoscopic converted to open cholecystectomy for a gangrenous necrotic gallbladder  Leukocytosis -continue IV antibiotics  Expected postoperative ileus -await bowel function -start clears -advance as tolerated  Deep breath and cough, ambulate    Andie Robison MD  General, Minimally Invasive and Endoscopic Surgery  [facility]    [address] [facility] [address] [place] [address]    [address]  [address] [place] [address]    P: [phone]  F: [phone]    Cc:  Hazel Fan MD

## 2021-02-05 NOTE — PLAN OF CARE
Goal Outcome Evaluation:  Plan of Care Reviewed With: patient  Progress: improving  Outcome Summary: Medicated x1 this shift for c/o abd/incisional pain; diet advanced to clear liquids; up ad jennifer; FAMILIA drain intact with serosang drainage; plan is home when stable

## 2021-02-05 NOTE — PLAN OF CARE
Goal Outcome Evaluation:  Plan of Care Reviewed With: patient  Progress: improving  Outcome Summary: Lap rafaela was converted to open secondary to increased fluid and fragility of gall bladder per surgeon.  Patinet in significant pain post op but does well with PO pain medication.  Patient FAMILIA drain output minimal, no drainage noted on dressing.

## 2021-02-05 NOTE — NURSING NOTE
"Continued Stay Note  FERNANDEZ Robles     Patient Name: Lisbeth Lucas  MRN: [id number]  Today's Date: 2/5/2021    Admit Date: 2/3/2021    Discharge Plan     Row Name 02/05/21 1420       Plan    Plan Comments  Patient resting in bed with no complaints.  She states \"I am so glad to get that gallbladder out.\"  Plan is to return home.  Will continue to follow        Discharge Codes    No documentation.             Ramila Avendaño RN    "

## 2021-02-05 NOTE — PROGRESS NOTES
Patient: Lisbeth Lucas  Procedure(s):  CHOLECYSTECTOMY LAPAROSCOPIC converted to open cholecystectomy  Anesthesia type: general    Patient location: [facility] Surgical Floor  Last vitals:   Vitals:    02/05/21 0620   BP: 124/81   Pulse: 77   Resp: 18   Temp: 97.8 °F (36.6 °C)   SpO2: 98%     Level of consciousness: awake, alert and oriented    Post-anesthesia pain: adequate analgesia  Airway patency: patent  Respiratory: unassisted  Cardiovascular: stable and blood pressure at baseline  Hydration: euvolemic    Anesthetic complications: no

## 2021-02-05 NOTE — ANESTHESIA POSTPROCEDURE EVALUATION
Patient: Lisbeth Lucas    Procedure Summary     Date: 02/04/21 Room / Location:  [facility] OR 3 /  [facility] OR    Anesthesia Start: 1515 Anesthesia Stop: 1735    Procedure: CHOLECYSTECTOMY LAPAROSCOPIC converted to open cholecystectomy (N/A Abdomen) Diagnosis:       Acute gangrenous cholecystitis      (Acute gangrenous cholecystitis [K81.0])    Surgeon: Andie Robison MD Provider: Joe Cortes CRNA    Anesthesia Type: general ASA Status: 2          Anesthesia Type: general    Vitals  Vitals Value Taken Time   /70 02/04/21 1910   Temp 98.1 °F (36.7 °C) 02/04/21 1733   Pulse 83 02/04/21 1910   Resp 12 02/04/21 1910   SpO2 95 % 02/04/21 1910           Post Anesthesia Care and Evaluation    Patient location during evaluation: PACU  Patient participation: complete - patient participated  Level of consciousness: awake and alert  Pain score: 3  Pain management: adequate  Airway patency: patent  Anesthetic complications: No anesthetic complications  PONV Status: none  Cardiovascular status: acceptable  Respiratory status: acceptable  Hydration status: acceptable

## 2021-02-05 NOTE — PROGRESS NOTES
"Daily Progress Note:      Chief complaint: Follow-up of cholecystitis, hypertension, chronic kidney disease, diarrhea    Subjective: No overnight events noted.  Patient was taken to the operating yesterday underwent a open cholecystectomy.  No cough congestion shortness of breath chest discomfort   LOS: 2 days     Vital Signs  Temp:  [97.6 °F (36.4 °C)-98.9 °F (37.2 °C)] 97.8 °F (36.6 °C)  Heart Rate:  [71-93] 77  Resp:  [12-20] 18  BP: (102-170)/(60-81) 124/81  Oxygen Therapy  SpO2: 98 %  Pulse Oximetry Type: Continuous  Device (Oxygen Therapy): nasal cannula with ETCO2  Flow (L/min): 2  ETCO2 (mmHg): 35 mmHg}  Body mass index is 34.76 kg/m².  Flowsheet Rows      First Filed Value   Admission Height  152.4 cm (60\") Documented at 02/03/2021 1330   Admission Weight  80.7 kg (178 lb) Documented at 02/03/2021 1330                   Documented weights    02/03/21 1330   Weight: 80.7 kg (178 lb)           Patient Vitals for the past 24 hrs:   BP Temp Temp src Pulse Resp SpO2 Weight   02/05/21 0620 124/81 97.8 °F (36.6 °C) Oral 77 18 98 % --   02/04/21 2311 117/72 97.6 °F (36.4 °C) Oral 91 18 96 % --   02/04/21 2115 113/62 98 °F (36.7 °C) Oral 86 16 98 % --   02/04/21 2045 116/67 98.5 °F (36.9 °C) Oral 87 18 97 % --   02/04/21 2030 110/63 98.7 °F (37.1 °C) Oral 83 20 96 % --   02/04/21 2025 -- -- -- -- -- 97 % --   02/04/21 2015 104/62 98.4 °F (36.9 °C) Oral 87 18 97 % --   02/04/21 2000 102/64 98.8 °F (37.1 °C) Oral 83 20 96 % --   02/04/21 1910 119/70 -- -- 83 12 95 % --   02/04/21 1905 118/63 -- -- 83 16 95 % --   02/04/21 1900 116/69 -- -- 81 14 95 % --   02/04/21 1855 123/66 -- -- 76 14 95 % --   02/04/21 1850 122/64 -- -- 80 15 95 % --   02/04/21 1845 120/64 -- -- 73 14 94 % --   02/04/21 1836 108/65 -- -- 72 14 -- --   02/04/21 1835 111/63 -- -- 71 15 94 % --   02/04/21 1830 108/60 -- -- 71 16 95 % --   02/04/21 1820 109/64 -- -- 71 14 94 % --   02/04/21 1815 108/60 -- -- 72 14 95 % --   02/04/21 1810 118/68 -- -- " 73 16 95 % --   02/04/21 1800 124/63 -- -- 76 14 96 % --   02/04/21 1757 -- -- -- -- -- 94 % --   02/04/21 1755 125/69 -- -- 79 15 (!) 89 % --   02/04/21 1751 140/71 -- -- 84 15 96 % --   02/04/21 1745 140/66 -- -- 81 16 93 % --   02/04/21 1740 138/68 -- -- 84 15 93 % --   02/04/21 1733 129/68 98.1 °F (36.7 °C) Infrared 83 14 93 % --   02/04/21 1450 170/81 98.5 °F (36.9 °C) Infrared 93 20 99 % --   02/04/21 1135 126/66 98.9 °F (37.2 °C) Oral 79 20 97 % --       80.7 kg (178 lb)      Intake/Output Summary (Last 24 hours) at 2/5/2021 0739  Last data filed at 2/5/2021 0608  Gross per 24 hour   Intake 740 ml   Output 525 ml   Net 215 ml       Review of Systems   Constitutional: Positive for fatigue. Negative for activity change and appetite change.   HENT: Negative for congestion.    Respiratory: Negative for cough, chest tightness, shortness of breath and wheezing.    Cardiovascular: Negative for chest pain.   Gastrointestinal: Positive for abdominal pain (Right upper quadrant incisional). Negative for abdominal distention, diarrhea, nausea and vomiting.   Endocrine: Negative for polyphagia and polyuria.   Genitourinary: Negative for frequency.   Skin: Negative for rash.   Neurological: Negative for light-headedness.   Hematological: Does not bruise/bleed easily.   Psychiatric/Behavioral: Negative for agitation and behavioral problems.       Physical Exam  Vitals signs and nursing note reviewed.   Constitutional:       Appearance: She is well-developed. She is obese.   HENT:      Head: Normocephalic.   Eyes:      Conjunctiva/sclera: Conjunctivae normal.   Neck:      Musculoskeletal: Normal range of motion.      Thyroid: No thyromegaly.      Vascular: No JVD.   Cardiovascular:      Rate and Rhythm: Normal rate and regular rhythm.      Heart sounds: Normal heart sounds. No murmur.   Pulmonary:      Effort: Pulmonary effort is normal. No respiratory distress.      Breath sounds: Normal breath sounds. No wheezing or rales.    Abdominal:      General: Bowel sounds are normal. There is no distension.      Palpations: Abdomen is soft.      Tenderness: There is abdominal tenderness (Dressing intact) in the right upper quadrant. There is no guarding.   Skin:     General: Skin is warm and dry.      Findings: No rash.   Neurological:      Mental Status: She is alert.         Medication Review:   I have reviewed the patient's current medication list  Scheduled Meds:allopurinol, 100 mg, Oral, BID  atorvastatin, 20 mg, Oral, Daily  cefTRIAXone, 1 g, Intravenous, Q24H  insulin aspart, 0-7 Units, Subcutaneous, TID AC  pantoprazole, 40 mg, Oral, QAM  predniSONE, 1 mg, Oral, Daily  pregabalin, 75 mg, Oral, Q12H  sodium chloride, 10 mL, Intravenous, Q12H  sodium chloride, 10 mL, Intravenous, Q12H      Continuous Infusions:lactated ringers, 100 mL/hr, Last Rate: 100 mL/hr (02/04/21 2011)      PRN Meds:.•  acetaminophen **OR** acetaminophen  •  dextrose  •  dextrose  •  glucagon (human recombinant)  •  HYDROcodone-acetaminophen  •  HYDROcodone-acetaminophen  •  HYDROmorphone **AND** naloxone  •  ondansetron **OR** ondansetron  •  sodium chloride  •  sodium chloride  •  sodium chloride      Labs:  Results from last 7 days   Lab Units 02/05/21 0337 02/04/21 0351 02/03/21  1032   WBC 10*3/mm3 28.06* 14.88* 17.74*   HEMOGLOBIN g/dL 10.0* 10.6* 11.7*   HEMATOCRIT % 31.1* 33.4* 36.4   PLATELETS 10*3/mm3 512* 430 519*     Results from last 7 days   Lab Units 02/05/21 0337 02/04/21 2013 02/04/21 0351 02/03/21  1032   SODIUM mmol/L 134* 133* 136 135*   POTASSIUM mmol/L 4.8 4.0 3.8 3.6   CHLORIDE mmol/L 94* 92* 94* 93*   CO2 mmol/L 25.4 23.0 30.5* 29.5*   BUN mg/dL 30* 24* 24* 29*   CREATININE mg/dL 1.83* 1.45* 1.16* 1.40*   CALCIUM mg/dL 9.0 9.3 9.5 10.0   BILIRUBIN mg/dL 0.5  --  0.6 0.6   ALK PHOS U/L 375*  --  455* 487*   ALT (SGPT) U/L 65*  --  45* 43*   AST (SGOT) U/L 78*  --  45* 39*   GLUCOSE mg/dL 193* 183* 116* 158*           Results from last 7  days   Lab Units 02/05/21 0337 02/04/21  0351 02/03/21  1540 02/03/21  1032   AST (SGOT) U/L 78* 45*  --  39*   ALT (SGPT) U/L 65* 45*  --  43*   LACTATE mmol/L  --   --  1.0  --    PLATELETS 10*3/mm3 512* 430  --  519*         Lab Results (last 24 hours)     Procedure Component Value Units Date/Time    Comprehensive Metabolic Panel [[id number]]  (Abnormal) Collected: 02/05/21 0337    Specimen: Blood Updated: 02/05/21 0535     Glucose 193 mg/dL      BUN 30 mg/dL      Creatinine 1.83 mg/dL      Sodium 134 mmol/L      Potassium 4.8 mmol/L      Chloride 94 mmol/L      CO2 25.4 mmol/L      Calcium 9.0 mg/dL      Total Protein 7.1 g/dL      Albumin 2.50 g/dL      ALT (SGPT) 65 U/L      AST (SGOT) 78 U/L      Alkaline Phosphatase 375 U/L      Total Bilirubin 0.5 mg/dL      eGFR Non African Amer 28 mL/min/1.73      Globulin 4.6 gm/dL      A/G Ratio 0.5 g/dL      BUN/Creatinine Ratio 16.4     Anion Gap 14.6 mmol/L     Narrative:      GFR Normal >60  Chronic Kidney Disease <60  Kidney Failure <15      CBC & Differential [[id number]]  (Abnormal) Collected: 02/05/21 0337    Specimen: Blood Updated: 02/05/21 0522    Narrative:      The following orders were created for panel order CBC & Differential.  Procedure                               Abnormality         Status                     ---------                               -----------         ------                     CBC Auto Differential[[id number]]        Abnormal            Final result                 Please view results for these tests on the individual orders.    CBC Auto Differential [[id number]]  (Abnormal) Collected: 02/05/21 0337    Specimen: Blood Updated: 02/05/21 0522     WBC 28.06 10*3/mm3      RBC 3.36 10*6/mm3      Hemoglobin 10.0 g/dL      Hematocrit 31.1 %      MCV 92.6 fL      MCH 29.8 pg      MCHC 32.2 g/dL      RDW 13.2 %      RDW-SD 44.2 fl      MPV 9.8 fL      Platelets 512 10*3/mm3      Neutrophil % 87.7 %      Lymphocyte % 4.9 %      Monocyte % 3.8 %       Eosinophil % 0.0 %      Basophil % 0.4 %      Immature Grans % 3.2 %      Neutrophils, Absolute 24.61 10*3/mm3      Lymphocytes, Absolute 1.38 10*3/mm3      Monocytes, Absolute 1.07 10*3/mm3      Eosinophils, Absolute 0.00 10*3/mm3      Basophils, Absolute 0.10 10*3/mm3      Immature Grans, Absolute 0.90 10*3/mm3      nRBC 0.0 /100 WBC     POC Glucose Once [[id number]]  (Abnormal) Collected: 02/05/21 0054    Specimen: Blood Updated: 02/05/21 0100     Glucose 199 mg/dL     Basic Metabolic Panel [[id number]]  (Abnormal) Collected: 02/04/21 2013    Specimen: Blood Updated: 02/04/21 2049     Glucose 183 mg/dL      BUN 24 mg/dL      Creatinine 1.45 mg/dL      Sodium 133 mmol/L      Potassium 4.0 mmol/L      Chloride 92 mmol/L      CO2 23.0 mmol/L      Calcium 9.3 mg/dL      eGFR Non African Amer 36 mL/min/1.73      BUN/Creatinine Ratio 16.6     Anion Gap 18.0 mmol/L     Narrative:      GFR Normal >60  Chronic Kidney Disease <60  Kidney Failure <15      Tissue Pathology Exam [[id number]] Collected: 02/04/21 1651    Specimen: Tissue from Gallbladder Updated: 02/04/21 1741    POC Glucose Once [[id number]]  (Normal) Collected: 02/04/21 1220    Specimen: Blood Updated: 02/04/21 1235     Glucose 119 mg/dL     Clostridium Difficile Toxin - Stool, Per Rectum [[id number]]  (Normal) Collected: 02/04/21 0959    Specimen: Stool from Per Rectum Updated: 02/04/21 1116    Narrative:      The following orders were created for panel order Clostridium Difficile Toxin - Stool, Per Rectum.  Procedure                               Abnormality         Status                     ---------                               -----------         ------                     Clostridium Difficile EI...[[id number]]  Normal              Final result                 Please view results for these tests on the individual orders.    Clostridium Difficile EIA - Stool, Per Rectum [[id number]]  (Normal) Collected: 02/04/21 0959    Specimen: Stool from Per Rectum  Updated: 02/04/21 1116     C Diff GDH / Toxin Negative                                        Glucose   Date/Time Value Ref Range Status   02/05/2021 0054 199 (H) 70 - 130 mg/dL Final   02/04/2021 1220 119 70 - 130 mg/dL Final   02/04/2021 0717 116 70 - 130 mg/dL Final   02/04/2021 0005 126 70 - 130 mg/dL Final   02/03/2021 1629 150 (H) 70 - 130 mg/dL Final     Results from last 7 days   Lab Units 02/03/21  1540   LACTATE mmol/L 1.0             Results from last 7 days   Lab Units 02/03/21  1627   ADENOVIRUS  Not Detected         Radiology:  Imaging Results (Last 24 Hours)     ** No results found for the last 24 hours. **          Cardiology:  ECG/EMG Results (last 24 hours)     ** No results found for the last 24 hours. **          I have reviewed recent labs results and consult notes.  Parts of this note may have been copied and pasted but patient was examined and interviewed by me today    Assessment and Plan:  1.  Acute gangrenous cholecystitis postop day #1 status post open cholecystectomy elevated white count noted expected we will continue to monitor     2.  Intermittent episodes of diarrhea resolved    3.  Adult-onset diabetes mellitus insulin-dependent controlled continue with Accu-Chek sliding cell insulin     4.  Hypertension nothing acute home medication will be continued        5.    Acute kidney injury chronic kidney stage III likely prerenal increase fluids monitor I/O's are not accurate will discuss with nursing     6.  History of pulmonary sarcoidosis in remission continue with low-dose steroids        7.  Hyperuricemia nothing acute resume allopurinol once taking p.o.     8.  DVT prophylaxis SCD    Much of this encounter note is an electronic transcription/translation of spoken language to printed text using Dragon Software

## 2021-02-06 LAB
ALBUMIN SERPL-MCNC: 2.5 G/DL (ref 3.5–5.2)
ALBUMIN/GLOB SERPL: 0.6 G/DL
ALP SERPL-CCNC: 288 U/L (ref 39–117)
ALT SERPL W P-5'-P-CCNC: 45 U/L (ref 1–33)
ANION GAP SERPL CALCULATED.3IONS-SCNC: 9.6 MMOL/L (ref 5–15)
AST SERPL-CCNC: 40 U/L (ref 1–32)
BASOPHILS # BLD AUTO: 0.06 10*3/MM3 (ref 0–0.2)
BASOPHILS NFR BLD AUTO: 0.3 % (ref 0–1.5)
BILIRUB SERPL-MCNC: 0.3 MG/DL (ref 0–1.2)
BUN SERPL-MCNC: 30 MG/DL (ref 8–23)
BUN/CREAT SERPL: 22.9 (ref 7–25)
CALCIUM SPEC-SCNC: 8.8 MG/DL (ref 8.6–10.5)
CHLORIDE SERPL-SCNC: 95 MMOL/L (ref 98–107)
CO2 SERPL-SCNC: 30.4 MMOL/L (ref 22–29)
CREAT SERPL-MCNC: 1.31 MG/DL (ref 0.57–1)
DEPRECATED RDW RBC AUTO: 44.5 FL (ref 37–54)
EOSINOPHIL # BLD AUTO: 0.05 10*3/MM3 (ref 0–0.4)
EOSINOPHIL NFR BLD AUTO: 0.3 % (ref 0.3–6.2)
ERYTHROCYTE [DISTWIDTH] IN BLOOD BY AUTOMATED COUNT: 13.2 % (ref 12.3–15.4)
GFR SERPL CREATININE-BSD FRML MDRD: 41 ML/MIN/1.73
GLOBULIN UR ELPH-MCNC: 4.1 GM/DL
GLUCOSE BLDC GLUCOMTR-MCNC: 136 MG/DL (ref 70–130)
GLUCOSE BLDC GLUCOMTR-MCNC: 155 MG/DL (ref 70–130)
GLUCOSE BLDC GLUCOMTR-MCNC: 180 MG/DL (ref 70–130)
GLUCOSE BLDC GLUCOMTR-MCNC: 193 MG/DL (ref 70–130)
GLUCOSE SERPL-MCNC: 142 MG/DL (ref 65–99)
HCT VFR BLD AUTO: 26.4 % (ref 34–46.6)
HGB BLD-MCNC: 8.4 G/DL (ref 12–15.9)
IMM GRANULOCYTES # BLD AUTO: 0.52 10*3/MM3 (ref 0–0.05)
IMM GRANULOCYTES NFR BLD AUTO: 2.8 % (ref 0–0.5)
LYMPHOCYTES # BLD AUTO: 2.13 10*3/MM3 (ref 0.7–3.1)
LYMPHOCYTES NFR BLD AUTO: 11.6 % (ref 19.6–45.3)
MCH RBC QN AUTO: 29.5 PG (ref 26.6–33)
MCHC RBC AUTO-ENTMCNC: 31.8 G/DL (ref 31.5–35.7)
MCV RBC AUTO: 92.6 FL (ref 79–97)
MONOCYTES # BLD AUTO: 1.15 10*3/MM3 (ref 0.1–0.9)
MONOCYTES NFR BLD AUTO: 6.3 % (ref 5–12)
NEUTROPHILS NFR BLD AUTO: 14.38 10*3/MM3 (ref 1.7–7)
NEUTROPHILS NFR BLD AUTO: 78.7 % (ref 42.7–76)
NRBC BLD AUTO-RTO: 0 /100 WBC (ref 0–0.2)
PLATELET # BLD AUTO: 478 10*3/MM3 (ref 140–450)
PMV BLD AUTO: 9.3 FL (ref 6–12)
POTASSIUM SERPL-SCNC: 3.9 MMOL/L (ref 3.5–5.2)
PROT SERPL-MCNC: 6.6 G/DL (ref 6–8.5)
RBC # BLD AUTO: 2.85 10*6/MM3 (ref 3.77–5.28)
SODIUM SERPL-SCNC: 135 MMOL/L (ref 136–145)
WBC # BLD AUTO: 18.29 10*3/MM3 (ref 3.4–10.8)

## 2021-02-06 PROCEDURE — 85025 COMPLETE CBC W/AUTO DIFF WBC: CPT | Performed by: SURGERY

## 2021-02-06 PROCEDURE — 80053 COMPREHEN METABOLIC PANEL: CPT | Performed by: SURGERY

## 2021-02-06 PROCEDURE — 63710000001 INSULIN ASPART PER 5 UNITS: Performed by: SURGERY

## 2021-02-06 PROCEDURE — 25010000002 CEFTRIAXONE SODIUM-DEXTROSE 1-3.74 GM-%(50ML) RECONSTITUTED SOLUTION: Performed by: SURGERY

## 2021-02-06 PROCEDURE — 63710000001 PREDNISONE PER 5 MG: Performed by: SURGERY

## 2021-02-06 PROCEDURE — 82962 GLUCOSE BLOOD TEST: CPT

## 2021-02-06 RX ADMIN — ALLOPURINOL 100 MG: 100 TABLET ORAL at 08:32

## 2021-02-06 RX ADMIN — SODIUM CHLORIDE, PRESERVATIVE FREE 10 ML: 5 INJECTION INTRAVENOUS at 20:42

## 2021-02-06 RX ADMIN — HYDROCODONE BITARTRATE AND ACETAMINOPHEN 1 TABLET: 5; 325 TABLET ORAL at 08:31

## 2021-02-06 RX ADMIN — PREGABALIN 75 MG: 75 CAPSULE ORAL at 08:32

## 2021-02-06 RX ADMIN — PREDNISONE 1 MG: 1 TABLET ORAL at 08:35

## 2021-02-06 RX ADMIN — SODIUM CHLORIDE, POTASSIUM CHLORIDE, SODIUM LACTATE AND CALCIUM CHLORIDE 100 ML/HR: 600; 310; 30; 20 INJECTION, SOLUTION INTRAVENOUS at 10:09

## 2021-02-06 RX ADMIN — CEFTRIAXONE 1 G: 1 INJECTION, SOLUTION INTRAVENOUS at 17:54

## 2021-02-06 RX ADMIN — ALLOPURINOL 100 MG: 100 TABLET ORAL at 20:42

## 2021-02-06 RX ADMIN — PREGABALIN 75 MG: 75 CAPSULE ORAL at 20:42

## 2021-02-06 RX ADMIN — PANTOPRAZOLE SODIUM 40 MG: 40 TABLET, DELAYED RELEASE ORAL at 08:31

## 2021-02-06 RX ADMIN — SODIUM CHLORIDE, PRESERVATIVE FREE 10 ML: 5 INJECTION INTRAVENOUS at 08:34

## 2021-02-06 RX ADMIN — HYDROCODONE BITARTRATE AND ACETAMINOPHEN 1 TABLET: 7.5; 325 TABLET ORAL at 00:32

## 2021-02-06 RX ADMIN — HYDROCODONE BITARTRATE AND ACETAMINOPHEN 1 TABLET: 5; 325 TABLET ORAL at 18:00

## 2021-02-06 RX ADMIN — INSULIN ASPART 2 UNITS: 100 INJECTION, SOLUTION INTRAVENOUS; SUBCUTANEOUS at 12:34

## 2021-02-06 RX ADMIN — ATORVASTATIN CALCIUM 20 MG: 20 TABLET, FILM COATED ORAL at 08:32

## 2021-02-06 RX ADMIN — INSULIN ASPART 2 UNITS: 100 INJECTION, SOLUTION INTRAVENOUS; SUBCUTANEOUS at 17:54

## 2021-02-06 RX ADMIN — HYDROCODONE BITARTRATE AND ACETAMINOPHEN 1 TABLET: 5; 325 TABLET ORAL at 14:18

## 2021-02-06 RX ADMIN — HYDROCODONE BITARTRATE AND ACETAMINOPHEN 1 TABLET: 5; 325 TABLET ORAL at 22:03

## 2021-02-06 RX ADMIN — SODIUM CHLORIDE, PRESERVATIVE FREE 10 ML: 5 INJECTION INTRAVENOUS at 08:33

## 2021-02-06 NOTE — PROGRESS NOTES
Surgery Progress Note   Chief Complaint:  POD 2    Subjective     Interval History:     I am following Lisbeth for Dr. Robison.   She is POD 2 from a laparoscopic converted to an open cholecystectomy.  She is doing well.  She is sore.  She has tolerated her clear liquids.  She has ambulated      Objective     Vital Signs  Temp:  [97.5 °F (36.4 °C)-97.8 °F (36.6 °C)] 97.8 °F (36.6 °C)  Heart Rate:  [78-84] 84  Resp:  [17-18] 18  BP: (116-127)/(68-74) 116/74  Body mass index is 34.76 kg/m².    Intake/Output Summary (Last 24 hours) at 2/6/2021 1147  Last data filed at 2/6/2021 1010  Gross per 24 hour   Intake 3657.5 ml   Output 1025 ml   Net 2632.5 ml     I/O this shift:  In: 1240 [P.O.:240; I.V.:1000]  Out: 1000 [Urine:1000]       Physical Exam:   General: patient awake, alert and cooperative   Cardiovascular: regular rhythm and rate   Pulm: clear to auscultation bilaterally   Abdomen: soft, good bowel sounds   FAMILIA: Serosanguineous drainage noted   Dressings:  Clean/dry/intact   Extremities: no rash or edema   Neurologic: Normal mood and behavior     Results Review:     I reviewed the patient's new clinical results.      WBC No results found for: WBCS   HGB Hemoglobin   Date Value Ref Range Status   02/06/2021 8.4 (L) 12.0 - 15.9 g/dL Final   02/05/2021 10.0 (L) 12.0 - 15.9 g/dL Final   02/04/2021 10.6 (L) 12.0 - 15.9 g/dL Final      HCT Hematocrit   Date Value Ref Range Status   02/06/2021 26.4 (L) 34.0 - 46.6 % Final   02/05/2021 31.1 (L) 34.0 - 46.6 % Final   02/04/2021 33.4 (L) 34.0 - 46.6 % Final      Platlets No results found for: LABPLAT     PT/INR:  No results found for: PROTIME/No results found for: INR    Sodium Sodium   Date Value Ref Range Status   02/06/2021 135 (L) 136 - 145 mmol/L Final   02/05/2021 134 (L) 136 - 145 mmol/L Final   02/04/2021 133 (L) 136 - 145 mmol/L Final   02/04/2021 136 136 - 145 mmol/L Final      Potassium Potassium   Date Value Ref Range Status   02/06/2021 3.9 3.5 - 5.2  mmol/L Final   02/05/2021 4.8 3.5 - 5.2 mmol/L Final   02/04/2021 4.0 3.5 - 5.2 mmol/L Final   02/04/2021 3.8 3.5 - 5.2 mmol/L Final      Chloride Chloride   Date Value Ref Range Status   02/06/2021 95 (L) 98 - 107 mmol/L Final   02/05/2021 94 (L) 98 - 107 mmol/L Final   02/04/2021 92 (L) 98 - 107 mmol/L Final   02/04/2021 94 (L) 98 - 107 mmol/L Final      Bicarbonate No results found for: PLASMABICARB   BUN BUN   Date Value Ref Range Status   02/06/2021 30 (H) 8 - 23 mg/dL Final   02/05/2021 30 (H) 8 - 23 mg/dL Final   02/04/2021 24 (H) 8 - 23 mg/dL Final   02/04/2021 24 (H) 8 - 23 mg/dL Final      Creatinine Creatinine   Date Value Ref Range Status   02/06/2021 1.31 (H) 0.57 - 1.00 mg/dL Final   02/05/2021 1.83 (H) 0.57 - 1.00 mg/dL Final   02/04/2021 1.45 (H) 0.57 - 1.00 mg/dL Final   02/04/2021 1.16 (H) 0.57 - 1.00 mg/dL Final      Calcium Calcium   Date Value Ref Range Status   02/06/2021 8.8 8.6 - 10.5 mg/dL Final   02/05/2021 9.0 8.6 - 10.5 mg/dL Final   02/04/2021 9.3 8.6 - 10.5 mg/dL Final   02/04/2021 9.5 8.6 - 10.5 mg/dL Final      Magnesium  AST  ALT  Bilirubin, Total  AlkPhos  Albumin    Amylase  Lipase    Radiology: No results found for: MG  No components found for: AST.*  No components found for: ALT.*  No results found for: BILIRUBIN    No components found for: ALKPHOS.*  No components found for: ALBUMIN.*      No components found for: AMYLASE.*  No components found for: LIPASE.*            Imaging Results (Most Recent)     Procedure Component Value Units Date/Time    XR Chest 2 View [[id number]] Collected: 02/03/21 1521     Updated: 02/03/21 1523    Narrative:      CR Chest 2 Vws    INDICATION:    66-year-old female with shortness of air for several weeks.    COMPARISON:    Chest 8/9/2013    FINDINGS:   PA and lateral views of the chest.  Heart and mediastinal contours are normal. The lungs are clear. No pneumothorax or pleural effusion.        Impression:      No acute cardiopulmonary  findings.    Signer Name: Sonu Bautista MD   Signed: 2/3/2021 3:21 PM   Workstation Name: [id number]    [facility]             lactated ringers, 50 mL/hr, Last Rate: 100 mL/hr (02/06/21 1009)          Assessment/Plan     Patient Active Problem List   Diagnosis Code   • GE (gastroenteritis) K52.9   • Acute gangrenous cholecystitis K81.0       POD 2  --will advance diet  --will decrease her fluids but continue due to her elevated Bun/cr  --I have encouraged ambulation and IS  --continue IV antibiotics (her WBC is improving)      Melody Gilliam DO  02/06/21  11:47 EST

## 2021-02-06 NOTE — PLAN OF CARE
Goal Outcome Evaluation:  Plan of Care Reviewed With: patient     Outcome Summary: pt up ad jennifer, ambulating about room, diet advanced to fullm tolerates without difficulty, abdominal dressing c/d/i, shabana with serosanguineous drainage noted, pain controlled with po norco. VSS.

## 2021-02-06 NOTE — PROGRESS NOTES
"Daily Progress Note:      Chief complaint: Follow-up of cholecystitis, hypertension, chronic kidney disease, diarrhea    Subjective: Postop day #2 open cholecystectomy she is without complaints except some incisional pain      LOS: 3 days     Vital Signs  Temp:  [97.5 °F (36.4 °C)-97.8 °F (36.6 °C)] 97.8 °F (36.6 °C)  Heart Rate:  [78-84] 84  Resp:  [17-18] 18  BP: (116-127)/(68-74) 116/74  Oxygen Therapy  SpO2: 91 %  Pulse Oximetry Type: Continuous  Device (Oxygen Therapy): room air  Flow (L/min): 2  ETCO2 (mmHg): 35 mmHg}  Body mass index is 34.76 kg/m².  Flowsheet Rows      First Filed Value   Admission Height  152.4 cm (60\") Documented at 02/03/2021 1330   Admission Weight  80.7 kg (178 lb) Documented at 02/03/2021 1330                   Documented weights    02/03/21 1330   Weight: 80.7 kg (178 lb)           Patient Vitals for the past 24 hrs:   BP Temp Temp src Pulse Resp SpO2   02/06/21 0627 116/74 97.8 °F (36.6 °C) Oral 84 18 91 %   02/05/21 1958 116/68 97.8 °F (36.6 °C) Oral 78 17 94 %   02/05/21 1526 127/69 97.5 °F (36.4 °C) Oral 81 18 92 %       80.7 kg (178 lb)      Intake/Output Summary (Last 24 hours) at 2/6/2021 0758  Last data filed at 2/6/2021 0500  Gross per 24 hour   Intake 3418.5 ml   Output 250 ml   Net 3168.5 ml       Review of Systems   Constitutional: Positive for fatigue. Negative for activity change and appetite change.   HENT: Negative for congestion.    Respiratory: Negative for cough, chest tightness, shortness of breath and wheezing.    Cardiovascular: Negative for chest pain.   Gastrointestinal: Positive for abdominal pain (Right upper quadrant incisional). Negative for abdominal distention, diarrhea, nausea and vomiting.   Endocrine: Negative for polyphagia and polyuria.   Genitourinary: Negative for frequency.   Skin: Negative for rash.   Neurological: Negative for light-headedness.   Hematological: Does not bruise/bleed easily.   Psychiatric/Behavioral: Negative for agitation and " behavioral problems.       Physical Exam  Vitals signs and nursing note reviewed.   Constitutional:       Appearance: She is well-developed. She is obese.   HENT:      Head: Normocephalic.   Eyes:      Conjunctiva/sclera: Conjunctivae normal.   Neck:      Musculoskeletal: Normal range of motion.      Thyroid: No thyromegaly.      Vascular: No JVD.   Cardiovascular:      Rate and Rhythm: Normal rate and regular rhythm.      Heart sounds: Normal heart sounds. No murmur.   Pulmonary:      Effort: Pulmonary effort is normal. No respiratory distress.      Breath sounds: Normal breath sounds. No wheezing or rales.   Abdominal:      General: Bowel sounds are decreased. There is no distension.      Palpations: Abdomen is soft.      Tenderness: There is abdominal tenderness (Dressing intact) in the right upper quadrant. There is no guarding.   Skin:     General: Skin is warm and dry.      Findings: No rash.   Neurological:      Mental Status: She is alert.         Medication Review:   I have reviewed the patient's current medication list  Scheduled Meds:allopurinol, 100 mg, Oral, BID  atorvastatin, 20 mg, Oral, Daily  cefTRIAXone, 1 g, Intravenous, Q24H  insulin aspart, 0-7 Units, Subcutaneous, TID AC  pantoprazole, 40 mg, Oral, QAM  predniSONE, 1 mg, Oral, Daily  pregabalin, 75 mg, Oral, Q12H  sodium chloride, 10 mL, Intravenous, Q12H  sodium chloride, 10 mL, Intravenous, Q12H      Continuous Infusions:lactated ringers, 100 mL/hr, Last Rate: 100 mL/hr (02/06/21 0033)      PRN Meds:.•  acetaminophen **OR** acetaminophen  •  dextrose  •  dextrose  •  glucagon (human recombinant)  •  HYDROcodone-acetaminophen  •  HYDROcodone-acetaminophen  •  HYDROmorphone **AND** naloxone  •  ondansetron **OR** ondansetron  •  sodium chloride  •  sodium chloride  •  sodium chloride      Labs:  Results from last 7 days   Lab Units 02/06/21  0444 02/05/21  0337 02/04/21  0351 02/03/21  1032   WBC 10*3/mm3 18.29* 28.06* 14.88* 17.74*    HEMOGLOBIN g/dL 8.4* 10.0* 10.6* 11.7*   HEMATOCRIT % 26.4* 31.1* 33.4* 36.4   PLATELETS 10*3/mm3 478* 512* 430 519*     Results from last 7 days   Lab Units 02/06/21 0444 02/05/21 0337 02/04/21 2013 02/04/21 0351 02/03/21  1032   SODIUM mmol/L 135* 134* 133* 136 135*   POTASSIUM mmol/L 3.9 4.8 4.0 3.8 3.6   CHLORIDE mmol/L 95* 94* 92* 94* 93*   CO2 mmol/L 30.4* 25.4 23.0 30.5* 29.5*   BUN mg/dL 30* 30* 24* 24* 29*   CREATININE mg/dL 1.31* 1.83* 1.45* 1.16* 1.40*   CALCIUM mg/dL 8.8 9.0 9.3 9.5 10.0   BILIRUBIN mg/dL 0.3 0.5  --  0.6 0.6   ALK PHOS U/L 288* 375*  --  455* 487*   ALT (SGPT) U/L 45* 65*  --  45* 43*   AST (SGOT) U/L 40* 78*  --  45* 39*   GLUCOSE mg/dL 142* 193* 183* 116* 158*           Results from last 7 days   Lab Units 02/06/21 0444 02/05/21 0337 02/04/21 0351 02/03/21  1540   AST (SGOT) U/L 40* 78* 45*  --    ALT (SGPT) U/L 45* 65* 45*  --    LACTATE mmol/L  --   --   --  1.0   PLATELETS 10*3/mm3 478* 512* 430  --          Lab Results (last 24 hours)     Procedure Component Value Units Date/Time    POC Glucose Once [[id number]]  (Abnormal) Collected: 02/06/21 0712    Specimen: Blood Updated: 02/06/21 0732     Glucose 136 mg/dL     Comprehensive Metabolic Panel [[id number]]  (Abnormal) Collected: 02/06/21 0444    Specimen: Blood Updated: 02/06/21 0601     Glucose 142 mg/dL      BUN 30 mg/dL      Creatinine 1.31 mg/dL      Sodium 135 mmol/L      Potassium 3.9 mmol/L      Chloride 95 mmol/L      CO2 30.4 mmol/L      Calcium 8.8 mg/dL      Total Protein 6.6 g/dL      Albumin 2.50 g/dL      ALT (SGPT) 45 U/L      AST (SGOT) 40 U/L      Alkaline Phosphatase 288 U/L      Total Bilirubin 0.3 mg/dL      eGFR Non African Amer 41 mL/min/1.73      Globulin 4.1 gm/dL      A/G Ratio 0.6 g/dL      BUN/Creatinine Ratio 22.9     Anion Gap 9.6 mmol/L     Narrative:      GFR Normal >60  Chronic Kidney Disease <60  Kidney Failure <15      CBC & Differential [[id number]]  (Abnormal) Collected: 02/06/21 0444     Specimen: Blood Updated: 02/06/21 0539    Narrative:      The following orders were created for panel order CBC & Differential.  Procedure                               Abnormality         Status                     ---------                               -----------         ------                     CBC Auto Differential[[id number]]        Abnormal            Final result                 Please view results for these tests on the individual orders.    CBC Auto Differential [[id number]]  (Abnormal) Collected: 02/06/21 0444    Specimen: Blood Updated: 02/06/21 0539     WBC 18.29 10*3/mm3      RBC 2.85 10*6/mm3      Hemoglobin 8.4 g/dL      Hematocrit 26.4 %      MCV 92.6 fL      MCH 29.5 pg      MCHC 31.8 g/dL      RDW 13.2 %      RDW-SD 44.5 fl      MPV 9.3 fL      Platelets 478 10*3/mm3      Neutrophil % 78.7 %      Lymphocyte % 11.6 %      Monocyte % 6.3 %      Eosinophil % 0.3 %      Basophil % 0.3 %      Immature Grans % 2.8 %      Neutrophils, Absolute 14.38 10*3/mm3      Lymphocytes, Absolute 2.13 10*3/mm3      Monocytes, Absolute 1.15 10*3/mm3      Eosinophils, Absolute 0.05 10*3/mm3      Basophils, Absolute 0.06 10*3/mm3      Immature Grans, Absolute 0.52 10*3/mm3      nRBC 0.0 /100 WBC     POC Glucose Once [[id number]]  (Abnormal) Collected: 02/06/21 0112    Specimen: Blood Updated: 02/06/21 0118     Glucose 155 mg/dL     POC Glucose Once [[id number]]  (Abnormal) Collected: 02/05/21 1640    Specimen: Blood Updated: 02/05/21 1648     Glucose 147 mg/dL     POC Glucose Once [[id number]]  (Abnormal) Collected: 02/05/21 1132    Specimen: Blood Updated: 02/05/21 1158     Glucose 168 mg/dL     POC Glucose Once [[id number]]  (Abnormal) Collected: 02/05/21 0810    Specimen: Blood Updated: 02/05/21 0817     Glucose 204 mg/dL                                         Glucose   Date/Time Value Ref Range Status   02/06/2021 0712 136 (H) 70 - 130 mg/dL Final   02/06/2021 0112 155 (H) 70 - 130 mg/dL Final   02/05/2021 1640 147  (H) 70 - 130 mg/dL Final   02/05/2021 1132 168 (H) 70 - 130 mg/dL Final   02/05/2021 0810 204 (H) 70 - 130 mg/dL Final   02/05/2021 0054 199 (H) 70 - 130 mg/dL Final   02/04/2021 1220 119 70 - 130 mg/dL Final   02/04/2021 0717 116 70 - 130 mg/dL Final     Results from last 7 days   Lab Units 02/03/21  1540   LACTATE mmol/L 1.0             Results from last 7 days   Lab Units 02/03/21  1627   ADENOVIRUS  Not Detected         Radiology:  Imaging Results (Last 24 Hours)     ** No results found for the last 24 hours. **          Cardiology:  ECG/EMG Results (last 24 hours)     ** No results found for the last 24 hours. **          I have reviewed recent labs results and consult notes.  Parts of this note may have been copied and pasted but patient was examined and interviewed by me today    Assessment and Plan:  1.  Acute gangrenous cholecystitis postop day #2  status post open cholecystectomy white count and liver enzymes are improving    2.    Acute kidney injury chronic kidney disease stage III renal functions are almost back to baseline continue with supportive care    3.  Adult-onset diabetes mellitus insulin-dependent controlled continue with Accu-Chek sliding cell insulin     4.  Hypertension nothing acute home medication will be continued        5.    Anemia new hemoglobin down to 8.4 incision looks intact we will monitor    6.  History of pulmonary sarcoidosis in remission continue with low-dose steroids      7.  DVT prophylaxis SCD    Much of this encounter note is an electronic transcription/translation of spoken language to printed text using Dragon Software

## 2021-02-06 NOTE — PLAN OF CARE
Goal Outcome Evaluation:        Outcome Summary: Patient resting comfortably in bed, FAMILIA drain intact with serosanguinous drainage, patient has no complaints of pain.

## 2021-02-07 LAB
ALBUMIN SERPL-MCNC: 2.7 G/DL (ref 3.5–5.2)
ALBUMIN/GLOB SERPL: 0.7 G/DL
ALP SERPL-CCNC: 658 U/L (ref 39–117)
ALT SERPL W P-5'-P-CCNC: 125 U/L (ref 1–33)
ANION GAP SERPL CALCULATED.3IONS-SCNC: 8.3 MMOL/L (ref 5–15)
AST SERPL-CCNC: 153 U/L (ref 1–32)
BASOPHILS # BLD AUTO: 0.07 10*3/MM3 (ref 0–0.2)
BASOPHILS NFR BLD AUTO: 0.5 % (ref 0–1.5)
BILIRUB SERPL-MCNC: 0.5 MG/DL (ref 0–1.2)
BUN SERPL-MCNC: 19 MG/DL (ref 8–23)
BUN/CREAT SERPL: 18.4 (ref 7–25)
CALCIUM SPEC-SCNC: 9 MG/DL (ref 8.6–10.5)
CHLORIDE SERPL-SCNC: 95 MMOL/L (ref 98–107)
CO2 SERPL-SCNC: 31.7 MMOL/L (ref 22–29)
CREAT SERPL-MCNC: 1.03 MG/DL (ref 0.57–1)
DEPRECATED RDW RBC AUTO: 45.2 FL (ref 37–54)
EOSINOPHIL # BLD AUTO: 0.16 10*3/MM3 (ref 0–0.4)
EOSINOPHIL NFR BLD AUTO: 1.2 % (ref 0.3–6.2)
ERYTHROCYTE [DISTWIDTH] IN BLOOD BY AUTOMATED COUNT: 13.2 % (ref 12.3–15.4)
GFR SERPL CREATININE-BSD FRML MDRD: 54 ML/MIN/1.73
GLOBULIN UR ELPH-MCNC: 3.8 GM/DL
GLUCOSE BLDC GLUCOMTR-MCNC: 132 MG/DL (ref 70–130)
GLUCOSE BLDC GLUCOMTR-MCNC: 181 MG/DL (ref 70–130)
GLUCOSE BLDC GLUCOMTR-MCNC: 192 MG/DL (ref 70–130)
GLUCOSE SERPL-MCNC: 139 MG/DL (ref 65–99)
HCT VFR BLD AUTO: 24.8 % (ref 34–46.6)
HGB BLD-MCNC: 8.1 G/DL (ref 12–15.9)
IMM GRANULOCYTES # BLD AUTO: 0.6 10*3/MM3 (ref 0–0.05)
IMM GRANULOCYTES NFR BLD AUTO: 4.4 % (ref 0–0.5)
LYMPHOCYTES # BLD AUTO: 2.44 10*3/MM3 (ref 0.7–3.1)
LYMPHOCYTES NFR BLD AUTO: 17.7 % (ref 19.6–45.3)
MCH RBC QN AUTO: 30.5 PG (ref 26.6–33)
MCHC RBC AUTO-ENTMCNC: 32.7 G/DL (ref 31.5–35.7)
MCV RBC AUTO: 93.2 FL (ref 79–97)
MONOCYTES # BLD AUTO: 1.08 10*3/MM3 (ref 0.1–0.9)
MONOCYTES NFR BLD AUTO: 7.8 % (ref 5–12)
NEUTROPHILS NFR BLD AUTO: 68.4 % (ref 42.7–76)
NEUTROPHILS NFR BLD AUTO: 9.41 10*3/MM3 (ref 1.7–7)
NRBC BLD AUTO-RTO: 0 /100 WBC (ref 0–0.2)
PLATELET # BLD AUTO: 439 10*3/MM3 (ref 140–450)
PMV BLD AUTO: 9.1 FL (ref 6–12)
POTASSIUM SERPL-SCNC: 3.9 MMOL/L (ref 3.5–5.2)
PROT SERPL-MCNC: 6.5 G/DL (ref 6–8.5)
RBC # BLD AUTO: 2.66 10*6/MM3 (ref 3.77–5.28)
SODIUM SERPL-SCNC: 135 MMOL/L (ref 136–145)
WBC # BLD AUTO: 13.76 10*3/MM3 (ref 3.4–10.8)

## 2021-02-07 PROCEDURE — 63710000001 INSULIN ASPART PER 5 UNITS: Performed by: SURGERY

## 2021-02-07 PROCEDURE — 25010000002 CEFTRIAXONE SODIUM-DEXTROSE 1-3.74 GM-%(50ML) RECONSTITUTED SOLUTION: Performed by: SURGERY

## 2021-02-07 PROCEDURE — 94799 UNLISTED PULMONARY SVC/PX: CPT

## 2021-02-07 PROCEDURE — 85025 COMPLETE CBC W/AUTO DIFF WBC: CPT | Performed by: SURGERY

## 2021-02-07 PROCEDURE — 63710000001 PREDNISONE PER 5 MG: Performed by: SURGERY

## 2021-02-07 PROCEDURE — 82962 GLUCOSE BLOOD TEST: CPT

## 2021-02-07 PROCEDURE — 80053 COMPREHEN METABOLIC PANEL: CPT | Performed by: SURGERY

## 2021-02-07 RX ORDER — BISACODYL 10 MG
10 SUPPOSITORY, RECTAL RECTAL ONCE
Status: COMPLETED | OUTPATIENT
Start: 2021-02-07 | End: 2021-02-07

## 2021-02-07 RX ADMIN — SODIUM CHLORIDE, PRESERVATIVE FREE 10 ML: 5 INJECTION INTRAVENOUS at 10:00

## 2021-02-07 RX ADMIN — PREGABALIN 75 MG: 75 CAPSULE ORAL at 08:13

## 2021-02-07 RX ADMIN — SODIUM CHLORIDE, PRESERVATIVE FREE 10 ML: 5 INJECTION INTRAVENOUS at 20:25

## 2021-02-07 RX ADMIN — HYDROCODONE BITARTRATE AND ACETAMINOPHEN 1 TABLET: 5; 325 TABLET ORAL at 08:16

## 2021-02-07 RX ADMIN — BISACODYL 10 MG: 10 SUPPOSITORY RECTAL at 13:53

## 2021-02-07 RX ADMIN — HYDROCODONE BITARTRATE AND ACETAMINOPHEN 1 TABLET: 5; 325 TABLET ORAL at 13:53

## 2021-02-07 RX ADMIN — ALLOPURINOL 100 MG: 100 TABLET ORAL at 20:21

## 2021-02-07 RX ADMIN — PREGABALIN 75 MG: 75 CAPSULE ORAL at 20:21

## 2021-02-07 RX ADMIN — HYDROCODONE BITARTRATE AND ACETAMINOPHEN 1 TABLET: 5; 325 TABLET ORAL at 20:21

## 2021-02-07 RX ADMIN — HYDROCODONE BITARTRATE AND ACETAMINOPHEN 1 TABLET: 5; 325 TABLET ORAL at 03:49

## 2021-02-07 RX ADMIN — INSULIN ASPART 2 UNITS: 100 INJECTION, SOLUTION INTRAVENOUS; SUBCUTANEOUS at 17:16

## 2021-02-07 RX ADMIN — ALLOPURINOL 100 MG: 100 TABLET ORAL at 08:14

## 2021-02-07 RX ADMIN — CEFTRIAXONE 1 G: 1 INJECTION, SOLUTION INTRAVENOUS at 17:16

## 2021-02-07 RX ADMIN — ATORVASTATIN CALCIUM 20 MG: 20 TABLET, FILM COATED ORAL at 08:13

## 2021-02-07 RX ADMIN — PREDNISONE 1 MG: 1 TABLET ORAL at 08:13

## 2021-02-07 RX ADMIN — PANTOPRAZOLE SODIUM 40 MG: 40 TABLET, DELAYED RELEASE ORAL at 08:13

## 2021-02-07 RX ADMIN — INSULIN ASPART 2 UNITS: 100 INJECTION, SOLUTION INTRAVENOUS; SUBCUTANEOUS at 11:50

## 2021-02-07 RX ADMIN — SODIUM CHLORIDE, POTASSIUM CHLORIDE, SODIUM LACTATE AND CALCIUM CHLORIDE 50 ML/HR: 600; 310; 30; 20 INJECTION, SOLUTION INTRAVENOUS at 03:54

## 2021-02-07 NOTE — NURSING NOTE
Continued Stay Note  FERNANDEZ Robles     Patient Name: Lisbeth Lucas  MRN: [id number]  Today's Date: 2/7/2021    Admit Date: 2/3/2021    Discharge Plan     Row Name 02/07/21 1133       Plan    Plan  Home with     Patient/Family in Agreement with Plan  yes    Plan Comments  Followed up with patient today to review discharge plans. Patient is currently sitting up in the chair with no complaints. Patient states she lives with her  and plans on returning home at discharge. She states that  can help at home as needed and will be able to provide ride home at discharge. Patient voiced no discharge needs at this time. Patient had no other questions or concerns regarding discharge plans. IMM updated. Name and number placed on white board in room. CM will continue to follow for needs.        Discharge Codes    No documentation.             Michell Lang RN

## 2021-02-07 NOTE — PLAN OF CARE
Goal Outcome Evaluation:  Plan of Care Reviewed With: patient     Outcome Summary: VSS, abdominal drsg changed per Dr. Mane jp with serousangiunous drainaige, continues to c/o abdominal pain, suppository given, awaiting results. remains on full liquid diet, encouraged to ambulate, iv saline locked.

## 2021-02-07 NOTE — PROGRESS NOTES
"Daily Progress Note:      Chief complaint: Follow-up of cholecystitis, hypertension, chronic kidney disease, diarrhea    Subjective: Postop day #3 open cholecystectomy complains of a lot of pressure and pain in the right upper quadrant.  Incisional.  No flatus      LOS: 4 days     Vital Signs  Temp:  [97.5 °F (36.4 °C)-98.5 °F (36.9 °C)] 98.5 °F (36.9 °C)  Heart Rate:  [] 96  Resp:  [18] 18  BP: ()/(52-81) 108/64  Oxygen Therapy  SpO2: 91 %  Pulse Oximetry Type: Continuous  Device (Oxygen Therapy): room air  Flow (L/min): 2  ETCO2 (mmHg): 35 mmHg}  Body mass index is 34.76 kg/m².  Flowsheet Rows      First Filed Value   Admission Height  152.4 cm (60\") Documented at 02/03/2021 1330   Admission Weight  80.7 kg (178 lb) Documented at 02/03/2021 1330                   Documented weights    02/03/21 1330   Weight: 80.7 kg (178 lb)           Patient Vitals for the past 24 hrs:   BP Temp Temp src Pulse Resp SpO2   02/07/21 0619 108/64 98.5 °F (36.9 °C) Oral 96 18 91 %   02/06/21 1942 97/52 97.5 °F (36.4 °C) Oral 100 18 94 %   02/06/21 1507 145/81 97.8 °F (36.6 °C) Oral 107 18 94 %       80.7 kg (178 lb)      Intake/Output Summary (Last 24 hours) at 2/7/2021 1128  Last data filed at 2/7/2021 1110  Gross per 24 hour   Intake 1383.33 ml   Output 1385 ml   Net -1.67 ml       Review of Systems   Constitutional: Negative for activity change, appetite change and fatigue.   HENT: Negative for congestion.    Respiratory: Negative for cough, chest tightness, shortness of breath and wheezing.    Cardiovascular: Negative for chest pain.   Gastrointestinal: Positive for abdominal pain (Right upper quadrant incisional). Negative for abdominal distention, diarrhea, nausea and vomiting.   Endocrine: Negative for polyphagia and polyuria.   Genitourinary: Negative for frequency.   Skin: Negative for rash.   Neurological: Negative for light-headedness.   Hematological: Does not bruise/bleed easily.   Psychiatric/Behavioral: " Negative for agitation and behavioral problems.       Physical Exam  Vitals signs and nursing note reviewed.   Constitutional:       Appearance: She is well-developed. She is obese.   HENT:      Head: Normocephalic.   Eyes:      Conjunctiva/sclera: Conjunctivae normal.   Neck:      Musculoskeletal: Normal range of motion.      Thyroid: No thyromegaly.      Vascular: No JVD.   Cardiovascular:      Rate and Rhythm: Normal rate and regular rhythm.      Heart sounds: Normal heart sounds. No murmur.   Pulmonary:      Effort: Pulmonary effort is normal. No respiratory distress.      Breath sounds: Normal breath sounds. No wheezing or rales.   Abdominal:      General: Bowel sounds are decreased. There is no distension.      Palpations: Abdomen is soft.      Tenderness: There is abdominal tenderness (Dressing intact) in the right upper quadrant. There is no guarding.   Skin:     General: Skin is warm and dry.      Findings: No rash.   Neurological:      Mental Status: She is alert.         Medication Review:   I have reviewed the patient's current medication list  Scheduled Meds:allopurinol, 100 mg, Oral, BID  atorvastatin, 20 mg, Oral, Daily  cefTRIAXone, 1 g, Intravenous, Q24H  insulin aspart, 0-7 Units, Subcutaneous, TID AC  pantoprazole, 40 mg, Oral, QAM  predniSONE, 1 mg, Oral, Daily  pregabalin, 75 mg, Oral, Q12H  sodium chloride, 10 mL, Intravenous, Q12H  sodium chloride, 10 mL, Intravenous, Q12H      Continuous Infusions:   PRN Meds:.•  acetaminophen **OR** acetaminophen  •  dextrose  •  dextrose  •  glucagon (human recombinant)  •  HYDROcodone-acetaminophen  •  HYDROcodone-acetaminophen  •  HYDROmorphone **AND** naloxone  •  ondansetron **OR** ondansetron  •  sodium chloride  •  sodium chloride  •  sodium chloride      Labs:  Results from last 7 days   Lab Units 02/07/21  0400 02/06/21  0444 02/05/21  0337 02/04/21  0351 02/03/21  1032   WBC 10*3/mm3 13.76* 18.29* 28.06* 14.88* 17.74*   HEMOGLOBIN g/dL 8.1* 8.4*  10.0* 10.6* 11.7*   HEMATOCRIT % 24.8* 26.4* 31.1* 33.4* 36.4   PLATELETS 10*3/mm3 439 478* 512* 430 519*     Results from last 7 days   Lab Units 02/07/21 0400 02/06/21 0444 02/05/21 0337 02/04/21 2013 02/04/21 0351 02/03/21  1032   SODIUM mmol/L 135* 135* 134* 133* 136 135*   POTASSIUM mmol/L 3.9 3.9 4.8 4.0 3.8 3.6   CHLORIDE mmol/L 95* 95* 94* 92* 94* 93*   CO2 mmol/L 31.7* 30.4* 25.4 23.0 30.5* 29.5*   BUN mg/dL 19 30* 30* 24* 24* 29*   CREATININE mg/dL 1.03* 1.31* 1.83* 1.45* 1.16* 1.40*   CALCIUM mg/dL 9.0 8.8 9.0 9.3 9.5 10.0   BILIRUBIN mg/dL 0.5 0.3 0.5  --  0.6 0.6   ALK PHOS U/L 658* 288* 375*  --  455* 487*   ALT (SGPT) U/L 125* 45* 65*  --  45* 43*   AST (SGOT) U/L 153* 40* 78*  --  45* 39*   GLUCOSE mg/dL 139* 142* 193* 183* 116* 158*           Results from last 7 days   Lab Units 02/07/21 0400 02/06/21 0444 02/05/21 0337 02/03/21  1540   AST (SGOT) U/L 153* 40* 78*   < >  --    ALT (SGPT) U/L 125* 45* 65*   < >  --    LACTATE mmol/L  --   --   --   --  1.0   PLATELETS 10*3/mm3 439 478* 512*   < >  --     < > = values in this interval not displayed.         Lab Results (last 24 hours)     Procedure Component Value Units Date/Time    POC Glucose Once [[id number]]  (Abnormal) Collected: 02/07/21 0711    Specimen: Blood Updated: 02/07/21 0727     Glucose 132 mg/dL     Comprehensive Metabolic Panel [[id number]]  (Abnormal) Collected: 02/07/21 0400    Specimen: Blood Updated: 02/07/21 0500     Glucose 139 mg/dL      BUN 19 mg/dL      Creatinine 1.03 mg/dL      Sodium 135 mmol/L      Potassium 3.9 mmol/L      Chloride 95 mmol/L      CO2 31.7 mmol/L      Calcium 9.0 mg/dL      Total Protein 6.5 g/dL      Albumin 2.70 g/dL      ALT (SGPT) 125 U/L      AST (SGOT) 153 U/L      Alkaline Phosphatase 658 U/L      Total Bilirubin 0.5 mg/dL      eGFR Non African Amer 54 mL/min/1.73      Globulin 3.8 gm/dL      A/G Ratio 0.7 g/dL      BUN/Creatinine Ratio 18.4     Anion Gap 8.3 mmol/L     Narrative:      GFR  Normal >60  Chronic Kidney Disease <60  Kidney Failure <15      CBC & Differential [[id number]]  (Abnormal) Collected: 02/07/21 0400    Specimen: Blood Updated: 02/07/21 0427    Narrative:      The following orders were created for panel order CBC & Differential.  Procedure                               Abnormality         Status                     ---------                               -----------         ------                     CBC Auto Differential[[id number]]        Abnormal            Final result                 Please view results for these tests on the individual orders.    CBC Auto Differential [[id number]]  (Abnormal) Collected: 02/07/21 0400    Specimen: Blood Updated: 02/07/21 0427     WBC 13.76 10*3/mm3      RBC 2.66 10*6/mm3      Hemoglobin 8.1 g/dL      Hematocrit 24.8 %      MCV 93.2 fL      MCH 30.5 pg      MCHC 32.7 g/dL      RDW 13.2 %      RDW-SD 45.2 fl      MPV 9.1 fL      Platelets 439 10*3/mm3      Neutrophil % 68.4 %      Lymphocyte % 17.7 %      Monocyte % 7.8 %      Eosinophil % 1.2 %      Basophil % 0.5 %      Immature Grans % 4.4 %      Neutrophils, Absolute 9.41 10*3/mm3      Lymphocytes, Absolute 2.44 10*3/mm3      Monocytes, Absolute 1.08 10*3/mm3      Eosinophils, Absolute 0.16 10*3/mm3      Basophils, Absolute 0.07 10*3/mm3      Immature Grans, Absolute 0.60 10*3/mm3      nRBC 0.0 /100 WBC     POC Glucose Once [[id number]]  (Abnormal) Collected: 02/06/21 1651    Specimen: Blood Updated: 02/06/21 1700     Glucose 193 mg/dL     POC Glucose Once [[id number]]  (Abnormal) Collected: 02/06/21 1126    Specimen: Blood Updated: 02/06/21 1152     Glucose 180 mg/dL                                         Glucose   Date/Time Value Ref Range Status   02/07/2021 0711 132 (H) 70 - 130 mg/dL Final   02/06/2021 1651 193 (H) 70 - 130 mg/dL Final   02/06/2021 1126 180 (H) 70 - 130 mg/dL Final   02/06/2021 0712 136 (H) 70 - 130 mg/dL Final   02/06/2021 0112 155 (H) 70 - 130 mg/dL Final   02/05/2021  1640 147 (H) 70 - 130 mg/dL Final   02/05/2021 1132 168 (H) 70 - 130 mg/dL Final   02/05/2021 0810 204 (H) 70 - 130 mg/dL Final     Results from last 7 days   Lab Units 02/03/21  1540   LACTATE mmol/L 1.0             Results from last 7 days   Lab Units 02/03/21  1627   ADENOVIRUS  Not Detected         Radiology:  Imaging Results (Last 24 Hours)     ** No results found for the last 24 hours. **          Cardiology:  ECG/EMG Results (last 24 hours)     ** No results found for the last 24 hours. **          I have reviewed recent labs results and consult notes.  Parts of this note may have been copied and pasted but patient was examined and interviewed by me today    Assessment and Plan:  1.  Acute gangrenous cholecystitis postop day #3  status post open cholecystectomy white count is improving but liver enzymes are significantly elevated from yesterday.  Await surgical evaluation    2.    Acute kidney injury chronic kidney disease stage III renal functions are almost back to baseline continue monitor renal functions    3.  Adult-onset diabetes mellitus insulin-dependent controlled continue with Accu-Chek sliding cell insulin     4.  Hypertension normotensive off medications        5.    Anemia stable postop    6.  History of pulmonary sarcoidosis in remission continue with low-dose steroids      7.  DVT prophylaxis SCD    Much of this encounter note is an electronic transcription/translation of spoken language to printed text using Dragon Software

## 2021-02-08 LAB
ALBUMIN SERPL-MCNC: 2.5 G/DL (ref 3.5–5.2)
ALBUMIN/GLOB SERPL: 0.7 G/DL
ALP SERPL-CCNC: 520 U/L (ref 39–117)
ALT SERPL W P-5'-P-CCNC: 79 U/L (ref 1–33)
ANION GAP SERPL CALCULATED.3IONS-SCNC: 8.8 MMOL/L (ref 5–15)
AST SERPL-CCNC: 60 U/L (ref 1–32)
BASOPHILS # BLD AUTO: 0.07 10*3/MM3 (ref 0–0.2)
BASOPHILS NFR BLD AUTO: 0.6 % (ref 0–1.5)
BILIRUB SERPL-MCNC: 0.5 MG/DL (ref 0–1.2)
BUN SERPL-MCNC: 12 MG/DL (ref 8–23)
BUN/CREAT SERPL: 11.9 (ref 7–25)
CALCIUM SPEC-SCNC: 9 MG/DL (ref 8.6–10.5)
CHLORIDE SERPL-SCNC: 96 MMOL/L (ref 98–107)
CO2 SERPL-SCNC: 32.2 MMOL/L (ref 22–29)
CREAT SERPL-MCNC: 1.01 MG/DL (ref 0.57–1)
DEPRECATED RDW RBC AUTO: 44.6 FL (ref 37–54)
EOSINOPHIL # BLD AUTO: 0.26 10*3/MM3 (ref 0–0.4)
EOSINOPHIL NFR BLD AUTO: 2.2 % (ref 0.3–6.2)
ERYTHROCYTE [DISTWIDTH] IN BLOOD BY AUTOMATED COUNT: 13.2 % (ref 12.3–15.4)
GFR SERPL CREATININE-BSD FRML MDRD: 55 ML/MIN/1.73
GLOBULIN UR ELPH-MCNC: 3.8 GM/DL
GLUCOSE BLDC GLUCOMTR-MCNC: 145 MG/DL (ref 70–130)
GLUCOSE BLDC GLUCOMTR-MCNC: 145 MG/DL (ref 70–130)
GLUCOSE BLDC GLUCOMTR-MCNC: 188 MG/DL (ref 70–130)
GLUCOSE BLDC GLUCOMTR-MCNC: 223 MG/DL (ref 70–130)
GLUCOSE SERPL-MCNC: 142 MG/DL (ref 65–99)
HCT VFR BLD AUTO: 25.3 % (ref 34–46.6)
HGB BLD-MCNC: 8 G/DL (ref 12–15.9)
IMM GRANULOCYTES # BLD AUTO: 0.57 10*3/MM3 (ref 0–0.05)
IMM GRANULOCYTES NFR BLD AUTO: 4.8 % (ref 0–0.5)
LAB AP CASE REPORT: NORMAL
LAB AP DIAGNOSIS COMMENT: NORMAL
LYMPHOCYTES # BLD AUTO: 2.07 10*3/MM3 (ref 0.7–3.1)
LYMPHOCYTES NFR BLD AUTO: 17.5 % (ref 19.6–45.3)
MCH RBC QN AUTO: 29.4 PG (ref 26.6–33)
MCHC RBC AUTO-ENTMCNC: 31.6 G/DL (ref 31.5–35.7)
MCV RBC AUTO: 93 FL (ref 79–97)
MONOCYTES # BLD AUTO: 0.88 10*3/MM3 (ref 0.1–0.9)
MONOCYTES NFR BLD AUTO: 7.5 % (ref 5–12)
NEUTROPHILS NFR BLD AUTO: 67.4 % (ref 42.7–76)
NEUTROPHILS NFR BLD AUTO: 7.95 10*3/MM3 (ref 1.7–7)
NRBC BLD AUTO-RTO: 0 /100 WBC (ref 0–0.2)
PATH REPORT.FINAL DX SPEC: NORMAL
PATH REPORT.GROSS SPEC: NORMAL
PLATELET # BLD AUTO: 465 10*3/MM3 (ref 140–450)
PMV BLD AUTO: 9.2 FL (ref 6–12)
POTASSIUM SERPL-SCNC: 3.8 MMOL/L (ref 3.5–5.2)
PROT SERPL-MCNC: 6.3 G/DL (ref 6–8.5)
RBC # BLD AUTO: 2.72 10*6/MM3 (ref 3.77–5.28)
SODIUM SERPL-SCNC: 137 MMOL/L (ref 136–145)
WBC # BLD AUTO: 11.8 10*3/MM3 (ref 3.4–10.8)

## 2021-02-08 PROCEDURE — 94799 UNLISTED PULMONARY SVC/PX: CPT

## 2021-02-08 PROCEDURE — 63710000001 PREDNISONE PER 5 MG: Performed by: SURGERY

## 2021-02-08 PROCEDURE — 80053 COMPREHEN METABOLIC PANEL: CPT | Performed by: SURGERY

## 2021-02-08 PROCEDURE — 99024 POSTOP FOLLOW-UP VISIT: CPT | Performed by: SURGERY

## 2021-02-08 PROCEDURE — 85025 COMPLETE CBC W/AUTO DIFF WBC: CPT | Performed by: SURGERY

## 2021-02-08 PROCEDURE — 25010000002 CEFTRIAXONE SODIUM-DEXTROSE 1-3.74 GM-%(50ML) RECONSTITUTED SOLUTION: Performed by: SURGERY

## 2021-02-08 PROCEDURE — 82962 GLUCOSE BLOOD TEST: CPT

## 2021-02-08 PROCEDURE — 63710000001 INSULIN ASPART PER 5 UNITS: Performed by: SURGERY

## 2021-02-08 RX ADMIN — ALLOPURINOL 100 MG: 100 TABLET ORAL at 08:20

## 2021-02-08 RX ADMIN — HYDROCODONE BITARTRATE AND ACETAMINOPHEN 1 TABLET: 7.5; 325 TABLET ORAL at 21:28

## 2021-02-08 RX ADMIN — SODIUM CHLORIDE, PRESERVATIVE FREE 10 ML: 5 INJECTION INTRAVENOUS at 08:20

## 2021-02-08 RX ADMIN — ATORVASTATIN CALCIUM 20 MG: 20 TABLET, FILM COATED ORAL at 08:20

## 2021-02-08 RX ADMIN — PREDNISONE 1 MG: 1 TABLET ORAL at 08:20

## 2021-02-08 RX ADMIN — PREGABALIN 75 MG: 75 CAPSULE ORAL at 20:00

## 2021-02-08 RX ADMIN — PANTOPRAZOLE SODIUM 40 MG: 40 TABLET, DELAYED RELEASE ORAL at 06:26

## 2021-02-08 RX ADMIN — INSULIN ASPART 3 UNITS: 100 INJECTION, SOLUTION INTRAVENOUS; SUBCUTANEOUS at 12:21

## 2021-02-08 RX ADMIN — SODIUM CHLORIDE, PRESERVATIVE FREE 10 ML: 5 INJECTION INTRAVENOUS at 20:00

## 2021-02-08 RX ADMIN — HYDROCODONE BITARTRATE AND ACETAMINOPHEN 1 TABLET: 7.5; 325 TABLET ORAL at 17:28

## 2021-02-08 RX ADMIN — HYDROCODONE BITARTRATE AND ACETAMINOPHEN 1 TABLET: 7.5; 325 TABLET ORAL at 12:21

## 2021-02-08 RX ADMIN — HYDROCODONE BITARTRATE AND ACETAMINOPHEN 1 TABLET: 5; 325 TABLET ORAL at 03:49

## 2021-02-08 RX ADMIN — INSULIN ASPART 2 UNITS: 100 INJECTION, SOLUTION INTRAVENOUS; SUBCUTANEOUS at 17:23

## 2021-02-08 RX ADMIN — CEFTRIAXONE 1 G: 1 INJECTION, SOLUTION INTRAVENOUS at 17:23

## 2021-02-08 RX ADMIN — ALLOPURINOL 100 MG: 100 TABLET ORAL at 20:00

## 2021-02-08 RX ADMIN — PREGABALIN 75 MG: 75 CAPSULE ORAL at 08:20

## 2021-02-08 RX ADMIN — HYDROCODONE BITARTRATE AND ACETAMINOPHEN 1 TABLET: 5; 325 TABLET ORAL at 08:24

## 2021-02-08 NOTE — NURSING NOTE
Continued Stay Note  FERNANDEZ Robles     Patient Name: Lisbeth Lucas  MRN: [id number]  Today's Date: 2/8/2021    Admit Date: 2/3/2021    Discharge Plan     Row Name 02/08/21 1442       Plan    Plan Comments  Patient up in chair and tolerating regular diet.  Plan is to return home when stable.  Will continue to follow        Discharge Codes    No documentation.             Ramila Avendaño RN

## 2021-02-08 NOTE — PAYOR COMM NOTE
"Lisbeth Blanca (66 y.o. Female)     ATTN: NURSE REVIEWER  RE: UPDATED CLINICALS  REF# [id number]  PLS REPLY TO GUILLERMO GREEN [phone] FAX# [phone]        Date of Birth Social Security Number Address Home Phone MRN    1955  [address] [place] [address] [phone] [id number]    [employer] Marital Status          None        Admission Date Admission Type Admitting Provider Attending Provider Department, Room/Bed    2/3/21 Urgent Hazel Fan MD Kemparajurs, Plavakeerthi, MD [facility] SURG, 1419/1    Discharge Date Discharge Disposition Discharge Destination                       Attending Provider: Hazel Fan MD    Allergies: No Known Allergies    Isolation: None   Infection: None   Code Status: CPR    Ht: 152.4 cm (60\")   Wt: 80.7 kg (178 lb)    Admission Cmt: None   Principal Problem: None                Active Insurance as of 2/3/2021     Primary Coverage     Payor Plan Insurance Group Employer/Plan Group    [facility] MEDICARE REPLACEMENT [employer] MEDICARE REPLACEMENT      Payor Plan Address Payor Plan Phone Number Payor Plan Fax Number Effective Dates    [address] [phone]  7/8/2020 - None Entered    [facility] [id number]       Subscriber Name Subscriber Birth Date Member ID       LISBETH BLANCA 1955 [id number]           Secondary Coverage     Payor Plan Insurance Group Employer/Plan Group    MEDICARE MEDICARE A & B      Payor Plan Address Payor Plan Phone Number Payor Plan Fax Number Effective Dates    [address] [phone]  2/1/2021 - None Entered    [place] [address]       Subscriber Name Subscriber Birth Date Member ID       LISBETH BLANCA 1955 [id number]                 Emergency Contacts      (Rel.) Home Phone Work Phone Mobile Phone    Nate Ramos (Son) [phone] -- --    TateMilana (Daughter) [phone] -- --            Vital Signs (last day)     Date/Time   Temp   " Temp src   Pulse   Resp   BP   Patient Position   SpO2    02/08/21 1523   97.4 (36.3)   Oral   78   16   140/60   Lying   96    02/08/21 1315   --   --   --   --   --   --   94    02/08/21 1130   97.6 (36.4)   Oral   102   18   114/59   Sitting   91    02/08/21 0558   98.4 (36.9)   Oral   91   18   111/68   Lying   91    02/07/21 1920   98.1 (36.7)   Oral   92   18   128/73   Lying   95    02/07/21 1528   97.8 (36.6)   Oral   78   16   139/71   Lying   97    02/07/21 0619   98.5 (36.9)   Oral   96   18   108/64   Lying   91              Lines, Drains & Airways    Active LDAs     Name:   Placement date:   Placement time:   Site:   Days:    Peripheral IV 02/05/21 0024 Right Forearm   02/05/21    0024    Forearm   3    Closed/Suction Drain Abdomen Bulb 19 Fr.   02/04/21    1717    Abdomen   4                  Current Facility-Administered Medications   Medication Dose Route Frequency Provider Last Rate Last Admin   • acetaminophen (TYLENOL) tablet 650 mg  650 mg Oral Q4H PRN Andie Robison MD        Or   • acetaminophen (TYLENOL) suppository 650 mg  650 mg Rectal Q4H PRN Andie Robison MD       • allopurinol (ZYLOPRIM) tablet 100 mg  100 mg Oral BID Andie Robison MD   100 mg at 02/08/21 0820   • atorvastatin (LIPITOR) tablet 20 mg  20 mg Oral Daily Andie Robison MD   20 mg at 02/08/21 0820   • cefTRIAXone (ROCEPHIN) IVPB 1 g/50ml dextrose (premix)  1 g Intravenous Q24H Andie Robison  mL/hr at 02/07/21 1716 1 g at 02/07/21 1716   • dextrose (D50W) 25 g/ 50mL Intravenous Solution 25 g  25 g Intravenous Q15 Min PRN Andie Robison MD       • dextrose (GLUTOSE) oral gel 15 g  15 g Oral Q15 Min PRN Andie Robison MD       • glucagon (GLUCAGEN) injection 1 mg  1 mg Subcutaneous Q15 Min PRN Andie Robison MD       • HYDROcodone-acetaminophen (NORCO) 5-325 MG per tablet 1 tablet  1 tablet Oral Q4H PRN Andie Robison MD   1 tablet at 02/08/21 0824   •  HYDROcodone-acetaminophen (NORCO) 7.5-325 MG per tablet 1 tablet  1 tablet Oral Q4H PRN Andie Robison MD   1 tablet at 02/08/21 1221   • HYDROmorphone (DILAUDID) injection 0.5 mg  0.5 mg Intravenous Q2H PRN Andie Robison MD        And   • naloxone (NARCAN) injection 0.1 mg  0.1 mg Intravenous Q5 Min PRN Andie Robison MD       • insulin aspart (novoLOG) injection 0-7 Units  0-7 Units Subcutaneous TID AC Andie Robison MD   3 Units at 02/08/21 1221   • ondansetron (ZOFRAN) tablet 4 mg  4 mg Oral 4x Daily PRN Andie Robison MD        Or   • ondansetron (ZOFRAN) injection 4 mg  4 mg Intravenous 4x Daily PRN Andie Robison MD       • pantoprazole (PROTONIX) EC tablet 40 mg  40 mg Oral QAM Andie Robison MD   40 mg at 02/08/21 0626   • predniSONE (DELTASONE) tablet 1 mg  1 mg Oral Daily Andie Robison MD   1 mg at 02/08/21 0820   • pregabalin (LYRICA) capsule 75 mg  75 mg Oral Q12H Andie Robison MD   75 mg at 02/08/21 0820   • sodium chloride 0.9 % flush 1-10 mL  1-10 mL Intravenous PRN Andie Robison MD       • sodium chloride 0.9 % flush 10 mL  10 mL Intravenous Q12H Andie Robison MD   10 mL at 02/08/21 0820   • sodium chloride 0.9 % flush 10 mL  10 mL Intravenous PRN Andie Robison MD       • sodium chloride 0.9 % flush 10 mL  10 mL Intravenous Q12H Andie Robison MD   10 mL at 02/07/21 2025   • sodium chloride 0.9 % infusion 40 mL  40 mL Intravenous PRN Andie Robison MD         Blood Administration Record (From admission, onward)    None          Lab Results (last 24 hours)     Procedure Component Value Units Date/Time    Tissue Pathology Exam [[id number]] Collected: 02/04/21 1651    Specimen: Tissue from Gallbladder Updated: 02/08/21 1223     Case Report --     Surgical Pathology Report                         Case: [id number]                                  Authorizing Provider:  Andie Robison MD     Collected:           02/04/2021  "04:51 PM          Ordering Location:     [facility]   Received:            02/04/2021 05:41 PM                                 OR                                                                           Pathologist:           Donny Duong MD                                                         Specimen:    Gallbladder                                                                                 Final Diagnosis --     1. Gallbladder, Cholecystectomy:  Benign gallbladder with   A. Acute necrotizing cholecystitis.   B. Extensive abscess formation.     Terell/kds       Comment --     There is a fragment of liver tissue with acute inflammation extending into the liver tissue and acute inflammation of reactive fibrous tissue around the liver tissue.    Terell/kds       Gross Description --     1.  Received in formalin labeled with the patient's name and designated \"gallbladder\".  The specimen consists of a pink to red distorted gallbladder that was received opened devoid of contents.  The gallbladder is in 3 fragments ranging in size from 2.6 cm up to 10.5 x 7.2 x 3.8 cm.  All the fragments are rubbery and diffusely erythematous.  The smaller fragments are unable to be oriented.  The largest fragment is focally disrupted at the fundus.  The serosal surface is wrinkled and slightly nodular.  The hepatic surface is markedly granular.  The fundus area of disruption measures approximately 4.3 x 3.5 cm.  The cystic duct appears intact and appears to be occluded.  Further opening the gallbladder reveals a minimal amount of green to yellow purulent material and clotted blood.  The gallbladder wall is variably thickened and diffusely indurated measuring up to 1.7 cm in maximum thickness.  The lumen is pink to red and trabecular.  No mass or polyp formation are grossly identified.  Sectioning through the gallbladder wall reveals multiple areas of hemorrhage.  Near the gallbladder neck the tissue is slightly more " edematous and has a green tinge to a portion of the tissue.  The specimen is submitted as follows:  1A - cystic duct and representative section near gallbladder neck.  1B - other representative sections near the gallbladder neck.  1C - representative sections from mid body.  1D - representative sections at the fundus.      Total blocks this case: 4.     mb/o/liban/brb      POC Glucose Once [[id number]]  (Abnormal) Collected: 02/08/21 1148    Specimen: Blood Updated: 02/08/21 1206     Glucose 223 mg/dL     POC Glucose Once [[id number]]  (Abnormal) Collected: 02/08/21 0802    Specimen: Blood Updated: 02/08/21 0809     Glucose 145 mg/dL     Comprehensive Metabolic Panel [[id number]]  (Abnormal) Collected: 02/08/21 0413    Specimen: Blood Updated: 02/08/21 0527     Glucose 142 mg/dL      BUN 12 mg/dL      Creatinine 1.01 mg/dL      Sodium 137 mmol/L      Potassium 3.8 mmol/L      Chloride 96 mmol/L      CO2 32.2 mmol/L      Calcium 9.0 mg/dL      Total Protein 6.3 g/dL      Albumin 2.50 g/dL      ALT (SGPT) 79 U/L      AST (SGOT) 60 U/L      Alkaline Phosphatase 520 U/L      Total Bilirubin 0.5 mg/dL      eGFR Non African Amer 55 mL/min/1.73      Globulin 3.8 gm/dL      A/G Ratio 0.7 g/dL      BUN/Creatinine Ratio 11.9     Anion Gap 8.8 mmol/L     Narrative:      GFR Normal >60  Chronic Kidney Disease <60  Kidney Failure <15      CBC & Differential [[id number]]  (Abnormal) Collected: 02/08/21 0413    Specimen: Blood Updated: 02/08/21 0503    Narrative:      The following orders were created for panel order CBC & Differential.  Procedure                               Abnormality         Status                     ---------                               -----------         ------                     CBC Auto Differential[[id number]]        Abnormal            Final result                 Please view results for these tests on the individual orders.    CBC Auto Differential [[id number]]  (Abnormal) Collected: 02/08/21 0413     Specimen: Blood Updated: 02/08/21 0503     WBC 11.80 10*3/mm3      RBC 2.72 10*6/mm3      Hemoglobin 8.0 g/dL      Hematocrit 25.3 %      MCV 93.0 fL      MCH 29.4 pg      MCHC 31.6 g/dL      RDW 13.2 %      RDW-SD 44.6 fl      MPV 9.2 fL      Platelets 465 10*3/mm3      Neutrophil % 67.4 %      Lymphocyte % 17.5 %      Monocyte % 7.5 %      Eosinophil % 2.2 %      Basophil % 0.6 %      Immature Grans % 4.8 %      Neutrophils, Absolute 7.95 10*3/mm3      Lymphocytes, Absolute 2.07 10*3/mm3      Monocytes, Absolute 0.88 10*3/mm3      Eosinophils, Absolute 0.26 10*3/mm3      Basophils, Absolute 0.07 10*3/mm3      Immature Grans, Absolute 0.57 10*3/mm3      nRBC 0.0 /100 WBC     POC Glucose Once [[id number]]  (Abnormal) Collected: 02/08/21 0326    Specimen: Blood Updated: 02/08/21 0332     Glucose 145 mg/dL         Imaging Results (Last 24 Hours)     ** No results found for the last 24 hours. **        ECG/EMG Results (last 24 hours)     ** No results found for the last 24 hours. **        Ventilator/Non-Invasive Ventilation Settings (From admission, onward)    None           Respiratory Therapy (last 24 hours)      Respiratory Therapy Flowsheet     Row Name 02/08/21 1523 02/08/21 1315 02/08/21 1130 02/08/21 0823 02/08/21 0558       $ Oximetry Charges    $ O2 Pt/System Assessment  --  yes  --  --  --       Oxygen Therapy    SpO2  96 %  94 %  91 %  --  91 %    Pulse Oximetry Type  --  --  --  --  Intermittent    Device (Oxygen Therapy)  room air  room air  room air  room air  room air       Vital Signs    Temp  97.4 °F (36.3 °C)  --  97.6 °F (36.4 °C)  --  98.4 °F (36.9 °C)    Temp src  Oral  --  Oral  --  Oral    Pulse  78  --  102  --  91    Heart Rate Source  Monitor  --  Monitor  --  Monitor    Resp  16  --  18  --  18    Resp Rate Source  Visual  --  Visual  --  Visual    BP  140/60  --  114/59  --  111/68    Noninvasive MAP (mmHg)  84  --  --  --  --    BP Location  Left arm  --  Left arm  --  Left arm    BP Method   Automatic  --  Automatic  --  Automatic    Patient Position  Lying  --  Sitting  --  Lying       Assessment    Respiratory WDL  --  --  --  WDL except  --    Cough Frequency  --  --  --  infrequent  --    Cough Type  --  --  --  good;nonproductive  --       Breath Sounds    Breath Sounds  --  --  --  All Fields  --    All Lung Fields Breath Sounds  --  --  --  clear  --    Row Name 02/08/21 0400 02/08/21 0200 02/08/21 0000 02/07/21 2200 02/07/21 2151       Oxygen Therapy    Device (Oxygen Therapy)  room air  --  --  --  --       Assessment    Respiratory WDL  WDL  --  WDL  --  --    Cough Frequency  infrequent  --  infrequent  --  --    Cough Type  nonproductive  --  nonproductive  --  --    Airway Safety Measures  mask at bedside  mask at bedside  mask at bedside  mask at bedside  --       Breath Sounds    Breath Sounds  All Fields  --  All Fields  --  --    All Lung Fields Breath Sounds  clear  --  clear  --  --       Treatment/Therapy    Incentive Spirometer Predicted Level (mL)  --  --  --  --  1500    Number of Repetitions (IS)  --  --  --  --  10    Level Incentive Spirometer (mL)  --  --  --  --  1500    Patient Tolerance (IS)  good  --  good  --  good    Row Name 02/07/21 2051 02/07/21 2000 02/07/21 1920 02/07/21 1817          Oxygen Therapy    SpO2  --  --  95 %  --     Pulse Oximetry Type  --  --  Intermittent  --     Device (Oxygen Therapy)  --  room air  room air  --        Vital Signs    Temp  --  --  98.1 °F (36.7 °C)  --     Temp src  --  --  Oral  --     Pulse  --  --  92  --     Heart Rate Source  --  --  Monitor  --     Resp  --  --  18  --     Resp Rate Source  --  --  Visual  --     BP  --  --  128/73  --     BP Location  --  --  Left arm  --     BP Method  --  --  Automatic  --     Patient Position  --  --  Lying  --        Assessment    Respiratory WDL  --  WDL  --  --     Cough Frequency  --  infrequent  --  --     Cough Type  --  nonproductive  --  --     Airway Safety Measures  --  mask at  bedside  --  mask at bedside        Breath Sounds    Breath Sounds  --  All Fields  --  --     All Lung Fields Breath Sounds  --  clear  --  --        Treatment/Therapy    Incentive Spirometer Predicted Level (mL)  1500  --  --  --     Patient Tolerance (IS)  --  good  --  --         Operative/Procedure Notes (last 24 hours) (Notes from 02/07/21 1717 through 02/08/21 1717)    No notes of this type exist for this encounter.

## 2021-02-08 NOTE — PROGRESS NOTES
"Daily Progress Note:      Chief complaint: Follow-up of cholecystitis, hypertension, chronic kidney disease, diarrhea    Subjective: Postop day #4 open cholecystectomy pain is improved is passing flatus has had a bowel movement      LOS: 5 days     Vital Signs  Temp:  [97.8 °F (36.6 °C)-98.4 °F (36.9 °C)] 98.4 °F (36.9 °C)  Heart Rate:  [78-92] 91  Resp:  [16-18] 18  BP: (111-139)/(68-73) 111/68  Oxygen Therapy  SpO2: 91 %  Pulse Oximetry Type: Intermittent  Device (Oxygen Therapy): room air  Flow (L/min): 2  ETCO2 (mmHg): 35 mmHg}  Body mass index is 34.76 kg/m².  Flowsheet Rows      First Filed Value   Admission Height  152.4 cm (60\") Documented at 02/03/2021 1330   Admission Weight  80.7 kg (178 lb) Documented at 02/03/2021 1330                   Documented weights    02/03/21 1330   Weight: 80.7 kg (178 lb)           Patient Vitals for the past 24 hrs:   BP Temp Temp src Pulse Resp SpO2   02/08/21 0558 111/68 98.4 °F (36.9 °C) Oral 91 18 91 %   02/07/21 1920 128/73 98.1 °F (36.7 °C) Oral 92 18 95 %   02/07/21 1528 139/71 97.8 °F (36.6 °C) Oral 78 16 97 %       80.7 kg (178 lb)      Intake/Output Summary (Last 24 hours) at 2/8/2021 0737  Last data filed at 2/8/2021 0300  Gross per 24 hour   Intake 1080 ml   Output 1761 ml   Net -681 ml       Review of Systems   Constitutional: Negative for activity change, appetite change and fatigue.   HENT: Negative for congestion.    Respiratory: Negative for cough, chest tightness, shortness of breath and wheezing.    Cardiovascular: Negative for chest pain.   Gastrointestinal: Positive for abdominal pain (Right upper quadrant incisional). Negative for abdominal distention, diarrhea, nausea and vomiting.   Endocrine: Negative for polyphagia and polyuria.   Genitourinary: Negative for frequency.   Skin: Negative for rash.   Neurological: Negative for light-headedness.   Hematological: Does not bruise/bleed easily.   Psychiatric/Behavioral: Negative for agitation and behavioral " problems.       Physical Exam  Vitals signs and nursing note reviewed.   Constitutional:       Appearance: She is well-developed. She is obese.   HENT:      Head: Normocephalic.   Eyes:      Conjunctiva/sclera: Conjunctivae normal.   Neck:      Musculoskeletal: Normal range of motion.      Thyroid: No thyromegaly.      Vascular: No JVD.   Cardiovascular:      Rate and Rhythm: Normal rate and regular rhythm.      Heart sounds: Normal heart sounds. No murmur.   Pulmonary:      Effort: Pulmonary effort is normal. No respiratory distress.      Breath sounds: Normal breath sounds. No wheezing or rales.   Abdominal:      General: Bowel sounds are normal. There is no distension.      Palpations: Abdomen is soft.      Tenderness: There is abdominal tenderness (Dressing intact) in the right upper quadrant. There is no guarding.   Skin:     General: Skin is warm and dry.      Findings: No rash.   Neurological:      Mental Status: She is alert.         Medication Review:   I have reviewed the patient's current medication list  Scheduled Meds:allopurinol, 100 mg, Oral, BID  atorvastatin, 20 mg, Oral, Daily  cefTRIAXone, 1 g, Intravenous, Q24H  insulin aspart, 0-7 Units, Subcutaneous, TID AC  pantoprazole, 40 mg, Oral, QAM  predniSONE, 1 mg, Oral, Daily  pregabalin, 75 mg, Oral, Q12H  sodium chloride, 10 mL, Intravenous, Q12H  sodium chloride, 10 mL, Intravenous, Q12H      Continuous Infusions:   PRN Meds:.•  acetaminophen **OR** acetaminophen  •  dextrose  •  dextrose  •  glucagon (human recombinant)  •  HYDROcodone-acetaminophen  •  HYDROcodone-acetaminophen  •  HYDROmorphone **AND** naloxone  •  ondansetron **OR** ondansetron  •  sodium chloride  •  sodium chloride  •  sodium chloride      Labs:  Results from last 7 days   Lab Units 02/08/21  0413 02/07/21  0400 02/06/21  0444 02/05/21  0337 02/04/21  0351 02/03/21  1032   WBC 10*3/mm3 11.80* 13.76* 18.29* 28.06* 14.88* 17.74*   HEMOGLOBIN g/dL 8.0* 8.1* 8.4* 10.0* 10.6*  11.7*   HEMATOCRIT % 25.3* 24.8* 26.4* 31.1* 33.4* 36.4   PLATELETS 10*3/mm3 465* 439 478* 512* 430 519*     Results from last 7 days   Lab Units 02/08/21 0413 02/07/21 0400 02/06/21 0444 02/05/21  0337 02/04/21 2013 02/04/21 0351 02/03/21  1032   SODIUM mmol/L 137 135* 135* 134* 133* 136 135*   POTASSIUM mmol/L 3.8 3.9 3.9 4.8 4.0 3.8 3.6   CHLORIDE mmol/L 96* 95* 95* 94* 92* 94* 93*   CO2 mmol/L 32.2* 31.7* 30.4* 25.4 23.0 30.5* 29.5*   BUN mg/dL 12 19 30* 30* 24* 24* 29*   CREATININE mg/dL 1.01* 1.03* 1.31* 1.83* 1.45* 1.16* 1.40*   CALCIUM mg/dL 9.0 9.0 8.8 9.0 9.3 9.5 10.0   BILIRUBIN mg/dL 0.5 0.5 0.3 0.5  --  0.6 0.6   ALK PHOS U/L 520* 658* 288* 375*  --  455* 487*   ALT (SGPT) U/L 79* 125* 45* 65*  --  45* 43*   AST (SGOT) U/L 60* 153* 40* 78*  --  45* 39*   GLUCOSE mg/dL 142* 139* 142* 193* 183* 116* 158*           Results from last 7 days   Lab Units 02/08/21 0413 02/07/21 0400 02/06/21 0444 02/03/21  1540   AST (SGOT) U/L 60* 153* 40*   < >  --    ALT (SGPT) U/L 79* 125* 45*   < >  --    LACTATE mmol/L  --   --   --   --  1.0   PLATELETS 10*3/mm3 465* 439 478*   < >  --     < > = values in this interval not displayed.         Lab Results (last 24 hours)     Procedure Component Value Units Date/Time    Comprehensive Metabolic Panel [[id number]]  (Abnormal) Collected: 02/08/21 0413    Specimen: Blood Updated: 02/08/21 0527     Glucose 142 mg/dL      BUN 12 mg/dL      Creatinine 1.01 mg/dL      Sodium 137 mmol/L      Potassium 3.8 mmol/L      Chloride 96 mmol/L      CO2 32.2 mmol/L      Calcium 9.0 mg/dL      Total Protein 6.3 g/dL      Albumin 2.50 g/dL      ALT (SGPT) 79 U/L      AST (SGOT) 60 U/L      Alkaline Phosphatase 520 U/L      Total Bilirubin 0.5 mg/dL      eGFR Non African Amer 55 mL/min/1.73      Globulin 3.8 gm/dL      A/G Ratio 0.7 g/dL      BUN/Creatinine Ratio 11.9     Anion Gap 8.8 mmol/L     Narrative:      GFR Normal >60  Chronic Kidney Disease <60  Kidney Failure <15      CBC &  Differential [[id number]]  (Abnormal) Collected: 02/08/21 0413    Specimen: Blood Updated: 02/08/21 0503    Narrative:      The following orders were created for panel order CBC & Differential.  Procedure                               Abnormality         Status                     ---------                               -----------         ------                     CBC Auto Differential[[id number]]        Abnormal            Final result                 Please view results for these tests on the individual orders.    CBC Auto Differential [[id number]]  (Abnormal) Collected: 02/08/21 0413    Specimen: Blood Updated: 02/08/21 0503     WBC 11.80 10*3/mm3      RBC 2.72 10*6/mm3      Hemoglobin 8.0 g/dL      Hematocrit 25.3 %      MCV 93.0 fL      MCH 29.4 pg      MCHC 31.6 g/dL      RDW 13.2 %      RDW-SD 44.6 fl      MPV 9.2 fL      Platelets 465 10*3/mm3      Neutrophil % 67.4 %      Lymphocyte % 17.5 %      Monocyte % 7.5 %      Eosinophil % 2.2 %      Basophil % 0.6 %      Immature Grans % 4.8 %      Neutrophils, Absolute 7.95 10*3/mm3      Lymphocytes, Absolute 2.07 10*3/mm3      Monocytes, Absolute 0.88 10*3/mm3      Eosinophils, Absolute 0.26 10*3/mm3      Basophils, Absolute 0.07 10*3/mm3      Immature Grans, Absolute 0.57 10*3/mm3      nRBC 0.0 /100 WBC     POC Glucose Once [[id number]]  (Abnormal) Collected: 02/08/21 0326    Specimen: Blood Updated: 02/08/21 0332     Glucose 145 mg/dL     POC Glucose Once [[id number]]  (Abnormal) Collected: 02/07/21 1625    Specimen: Blood Updated: 02/07/21 1635     Glucose 181 mg/dL     POC Glucose Once [[id number]]  (Abnormal) Collected: 02/07/21 1109    Specimen: Blood Updated: 02/07/21 1134     Glucose 192 mg/dL                                         Glucose   Date/Time Value Ref Range Status   02/08/2021 0326 145 (H) 70 - 130 mg/dL Final   02/07/2021 1625 181 (H) 70 - 130 mg/dL Final   02/07/2021 1109 192 (H) 70 - 130 mg/dL Final   02/07/2021 0711 132 (H) 70 - 130 mg/dL Final    02/06/2021 1651 193 (H) 70 - 130 mg/dL Final   02/06/2021 1126 180 (H) 70 - 130 mg/dL Final   02/06/2021 0712 136 (H) 70 - 130 mg/dL Final   02/06/2021 0112 155 (H) 70 - 130 mg/dL Final     Results from last 7 days   Lab Units 02/03/21  1540   LACTATE mmol/L 1.0             Results from last 7 days   Lab Units 02/03/21  1627   ADENOVIRUS  Not Detected         Radiology:  Imaging Results (Last 24 Hours)     ** No results found for the last 24 hours. **          Cardiology:  ECG/EMG Results (last 24 hours)     ** No results found for the last 24 hours. **          I have reviewed recent labs results and consult notes.  Parts of this note may have been copied and pasted but patient was examined and interviewed by me today    Assessment and Plan:  1.  Acute gangrenous cholecystitis postop day #4  status post open cholecystectomy white count is improving liver enzymes improving has had a bowel movement and is passing flatus advance diet per surgery    2.    Acute kidney injury chronic kidney disease stage III renal functions are almost back to baseline continue monitor renal functions    3.  Adult-onset diabetes mellitus insulin-dependent controlled continue with Accu-Chek sliding cell insulin     4.  Hypertension normotensive off medications        5.    Anemia stable postop    6.  History of pulmonary sarcoidosis in remission continue with low-dose steroids      7.  DVT prophylaxis SCD    Much of this encounter note is an electronic transcription/translation of spoken language to printed text using Dragon Software

## 2021-02-08 NOTE — PROGRESS NOTES
Chief Complaint: POD # 4    Subjective   Patient reports positive flatus, no nausea or vomiting, no bowel movement  Objective     Vital signs in last 24 hours:  Temp:  [97.6 °F (36.4 °C)-98.4 °F (36.9 °C)] 97.6 °F (36.4 °C)  Heart Rate:  [] 102  Resp:  [16-18] 18  BP: (111-139)/(59-73) 114/59    Intake/Output last 3 shifts:  I/O last 3 completed shifts:  In: 1080 [P.O.:1080]  Out: 2121 [Urine:2100; Drains:20; Stool:1]    Intake/Output this shift:  I/O this shift:  In: 600 [P.O.:600]  Out: 700 [Urine:700]    Physical Exam:  Respiratory: CTA, good inspiratory effort  CV: RRR  Abd: + BS, soft, ND, usual post-op tenderness, no rebound, no guarding, incision C/D/I  FAMILIA drain serous  Results from last 7 days   Lab Units 02/08/21  0413   SODIUM mmol/L 137   POTASSIUM mmol/L 3.8   CHLORIDE mmol/L 96*   CO2 mmol/L 32.2*   BUN mg/dL 12   CREATININE mg/dL 1.01*   CALCIUM mg/dL 9.0   BILIRUBIN mg/dL 0.5   ALK PHOS U/L 520*   ALT (SGPT) U/L 79*   AST (SGOT) U/L 60*   GLUCOSE mg/dL 142*     Results from last 7 days   Lab Units 02/08/21  0413   WBC 10*3/mm3 11.80*   HEMOGLOBIN g/dL 8.0*   HEMATOCRIT % 25.3*   PLATELETS 10*3/mm3 465*     Assessment/Plan   S/P Lap coverted to open cholecystectomy   Expected post-op ileus slowly resolving, tolerating diet   Leukocytosis - improving, change to po antibiotics when WBC is normal for a total of 14 days   Creatine improving     Andie Robison MD  General, Minimally Invasive and Endoscopic Surgery  [facility]    [address]    [address]  [place] [address]               [place] [address]    P: [phone]  F: [phone]    Cc:  Hazel Fan MD

## 2021-02-08 NOTE — PROGRESS NOTES
Adult Nutrition  Assessment/PES    Patient Name:  Lisbeth Lucas  YOB: 1955  MRN: [id number]  Admit Date:  2/3/2021    Assessment Date:  2/8/2021    Comments:  Encourage po and voice prefs. Expect improving po with regular diet today    Reason for Assessment     Row Name 02/08/21 1131          Reason for Assessment    Reason For Assessment  follow-up protocol     Diagnosis  gastrointestinal disease s/p open CCY for gangrenous gall bladder, A/CKD, DM, HTN hx sarcoidosis         Nutrition/Diet History     Row Name 02/08/21 1132          Nutrition/Diet History    Typical Food/Fluid Intake  Spoke w pt during breakfast reported tired of Fulls ready for Regular diet.         Anthropometrics     Row Name 02/08/21 1132          Anthropometrics    Weight  -- no new wt        Body Mass Index (BMI)    BMI Assessment  BMI 30-34.9: obesity grade I         Labs/Tests/Procedures/Meds     Row Name 02/08/21 1132          Labs/Procedures/Meds    Lab Results Reviewed  reviewed     Lab Results Comments  glu 145        Diagnostic Tests/Procedures    Diagnostic Test/Procedure Reviewed  reviewed        Medications    Pertinent Medications Reviewed  reviewed     Pertinent Medications Comments  novolog, prednisone             Nutrition Prescription Ordered     Row Name 02/08/21 1133          Nutrition Prescription PO    Current PO Diet  Regular         Evaluation of Received Nutrient/Fluid Intake     Row Name 02/08/21 1133          Fluid Intake Evaluation    Oral Fluid (mL)  [address] ave x 3, 54%        PO Evaluation    Number of Meals  3     % PO Intake  67               Problem/Interventions:  Problem 1     Row Name 02/08/21 1134          Nutrition Diagnoses Problem 1    Problem 1  Altered GI Function     Etiology (related to)  Medical Diagnosis     Signs/Symptoms (evidenced by)  Report/Observation               Intervention Goal     Row Name 02/08/21 1134          Intervention Goal    General  Meet nutritional needs for  age/condition     PO  Tolerate PO;PO intake (%)     PO Intake %  50 % or greater         Nutrition Intervention     Row Name 02/08/21 1134          Nutrition Intervention    RD/Tech Action  Follow Tx progress           Education/Evaluation     Row Name 02/08/21 1134          Education    Education  No discharge needs identified at this time        Monitor/Evaluation    Monitor  Per protocol;I&O;PO intake;Pertinent labs;Weight           Electronically signed by:  Izzy Rust RD  02/08/21 11:35 EST

## 2021-02-08 NOTE — PLAN OF CARE
Goal Outcome Evaluation:  Plan of Care Reviewed With: patient  Progress: improving  Outcome Summary: FAMILIA with small amt serosang drainage; pain controlled with oral pain med;diet advanced to reg; tolerating well; up ad jennifer; Rocephin q 24 hrs

## 2021-02-08 NOTE — ADDENDUM NOTE
Encounter addended by: Melissa Partida RN on: 2/8/2021 8:16 AM   Actions taken: Clinical Note Signed

## 2021-02-09 LAB
ALBUMIN SERPL-MCNC: 2.6 G/DL (ref 3.5–5.2)
ALBUMIN/GLOB SERPL: 0.7 G/DL
ALP SERPL-CCNC: 502 U/L (ref 39–117)
ALT SERPL W P-5'-P-CCNC: 61 U/L (ref 1–33)
ANION GAP SERPL CALCULATED.3IONS-SCNC: 9.6 MMOL/L (ref 5–15)
AST SERPL-CCNC: 37 U/L (ref 1–32)
BILIRUB SERPL-MCNC: 0.4 MG/DL (ref 0–1.2)
BUN SERPL-MCNC: 12 MG/DL (ref 8–23)
BUN/CREAT SERPL: 11.7 (ref 7–25)
CALCIUM SPEC-SCNC: 9 MG/DL (ref 8.6–10.5)
CHLORIDE SERPL-SCNC: 95 MMOL/L (ref 98–107)
CO2 SERPL-SCNC: 31.4 MMOL/L (ref 22–29)
CREAT SERPL-MCNC: 1.03 MG/DL (ref 0.57–1)
DEPRECATED RDW RBC AUTO: 44.3 FL (ref 37–54)
ERYTHROCYTE [DISTWIDTH] IN BLOOD BY AUTOMATED COUNT: 13 % (ref 12.3–15.4)
GFR SERPL CREATININE-BSD FRML MDRD: 54 ML/MIN/1.73
GLOBULIN UR ELPH-MCNC: 3.8 GM/DL
GLUCOSE BLDC GLUCOMTR-MCNC: 117 MG/DL (ref 70–130)
GLUCOSE BLDC GLUCOMTR-MCNC: 160 MG/DL (ref 70–130)
GLUCOSE BLDC GLUCOMTR-MCNC: 219 MG/DL (ref 70–130)
GLUCOSE BLDC GLUCOMTR-MCNC: 231 MG/DL (ref 70–130)
GLUCOSE SERPL-MCNC: 145 MG/DL (ref 65–99)
HCT VFR BLD AUTO: 25.3 % (ref 34–46.6)
HGB BLD-MCNC: 7.9 G/DL (ref 12–15.9)
MCH RBC QN AUTO: 29 PG (ref 26.6–33)
MCHC RBC AUTO-ENTMCNC: 31.2 G/DL (ref 31.5–35.7)
MCV RBC AUTO: 93 FL (ref 79–97)
PLATELET # BLD AUTO: 415 10*3/MM3 (ref 140–450)
PMV BLD AUTO: 8.8 FL (ref 6–12)
POTASSIUM SERPL-SCNC: 3.6 MMOL/L (ref 3.5–5.2)
PROT SERPL-MCNC: 6.4 G/DL (ref 6–8.5)
RBC # BLD AUTO: 2.72 10*6/MM3 (ref 3.77–5.28)
SODIUM SERPL-SCNC: 136 MMOL/L (ref 136–145)
WBC # BLD AUTO: 9.83 10*3/MM3 (ref 3.4–10.8)

## 2021-02-09 PROCEDURE — 99024 POSTOP FOLLOW-UP VISIT: CPT | Performed by: SURGERY

## 2021-02-09 PROCEDURE — 85027 COMPLETE CBC AUTOMATED: CPT | Performed by: FAMILY MEDICINE

## 2021-02-09 PROCEDURE — 94799 UNLISTED PULMONARY SVC/PX: CPT

## 2021-02-09 PROCEDURE — 25010000002 CEFTRIAXONE SODIUM-DEXTROSE 1-3.74 GM-%(50ML) RECONSTITUTED SOLUTION: Performed by: SURGERY

## 2021-02-09 PROCEDURE — 82962 GLUCOSE BLOOD TEST: CPT

## 2021-02-09 PROCEDURE — 63710000001 PREDNISONE PER 5 MG: Performed by: SURGERY

## 2021-02-09 PROCEDURE — 80053 COMPREHEN METABOLIC PANEL: CPT | Performed by: FAMILY MEDICINE

## 2021-02-09 PROCEDURE — 63710000001 INSULIN ASPART PER 5 UNITS: Performed by: FAMILY MEDICINE

## 2021-02-09 RX ORDER — AMOXICILLIN AND CLAVULANATE POTASSIUM 875; 125 MG/1; MG/1
1 TABLET, FILM COATED ORAL EVERY 12 HOURS SCHEDULED
Status: DISCONTINUED | OUTPATIENT
Start: 2021-02-09 | End: 2021-02-11 | Stop reason: HOSPADM

## 2021-02-09 RX ORDER — ATORVASTATIN CALCIUM 20 MG/1
20 TABLET, FILM COATED ORAL NIGHTLY
Status: DISCONTINUED | OUTPATIENT
Start: 2021-02-09 | End: 2021-02-11 | Stop reason: HOSPADM

## 2021-02-09 RX ORDER — GLIPIZIDE 5 MG/1
2.5 TABLET ORAL
Status: DISCONTINUED | OUTPATIENT
Start: 2021-02-09 | End: 2021-02-11 | Stop reason: HOSPADM

## 2021-02-09 RX ADMIN — GLIPIZIDE 2.5 MG: 5 TABLET ORAL at 08:30

## 2021-02-09 RX ADMIN — HYDROCODONE BITARTRATE AND ACETAMINOPHEN 1 TABLET: 5; 325 TABLET ORAL at 18:10

## 2021-02-09 RX ADMIN — PANTOPRAZOLE SODIUM 40 MG: 40 TABLET, DELAYED RELEASE ORAL at 06:14

## 2021-02-09 RX ADMIN — SODIUM CHLORIDE, PRESERVATIVE FREE 10 ML: 5 INJECTION INTRAVENOUS at 20:29

## 2021-02-09 RX ADMIN — INSULIN ASPART 4 UNITS: 100 INJECTION, SOLUTION INTRAVENOUS; SUBCUTANEOUS at 12:00

## 2021-02-09 RX ADMIN — GLIPIZIDE 2.5 MG: 5 TABLET ORAL at 18:10

## 2021-02-09 RX ADMIN — AMOXICILLIN AND CLAVULANATE POTASSIUM 1 TABLET: 875; 125 TABLET, FILM COATED ORAL at 20:28

## 2021-02-09 RX ADMIN — INSULIN ASPART 4 UNITS: 100 INJECTION, SOLUTION INTRAVENOUS; SUBCUTANEOUS at 20:41

## 2021-02-09 RX ADMIN — HYDROCODONE BITARTRATE AND ACETAMINOPHEN 1 TABLET: 7.5; 325 TABLET ORAL at 21:59

## 2021-02-09 RX ADMIN — SODIUM CHLORIDE, PRESERVATIVE FREE 10 ML: 5 INJECTION INTRAVENOUS at 08:30

## 2021-02-09 RX ADMIN — PREGABALIN 75 MG: 75 CAPSULE ORAL at 08:29

## 2021-02-09 RX ADMIN — PREDNISONE 1 MG: 1 TABLET ORAL at 08:29

## 2021-02-09 RX ADMIN — HYDROCODONE BITARTRATE AND ACETAMINOPHEN 1 TABLET: 5; 325 TABLET ORAL at 08:29

## 2021-02-09 RX ADMIN — ATORVASTATIN CALCIUM 20 MG: 20 TABLET, FILM COATED ORAL at 08:33

## 2021-02-09 RX ADMIN — HYDROCODONE BITARTRATE AND ACETAMINOPHEN 1 TABLET: 7.5; 325 TABLET ORAL at 04:01

## 2021-02-09 RX ADMIN — HYDROCODONE BITARTRATE AND ACETAMINOPHEN 1 TABLET: 5; 325 TABLET ORAL at 14:03

## 2021-02-09 RX ADMIN — ATORVASTATIN CALCIUM 20 MG: 20 TABLET, FILM COATED ORAL at 20:28

## 2021-02-09 RX ADMIN — CEFTRIAXONE 1 G: 1 INJECTION, SOLUTION INTRAVENOUS at 17:57

## 2021-02-09 RX ADMIN — ALLOPURINOL 100 MG: 100 TABLET ORAL at 20:28

## 2021-02-09 RX ADMIN — PREGABALIN 75 MG: 75 CAPSULE ORAL at 20:28

## 2021-02-09 RX ADMIN — ALLOPURINOL 100 MG: 100 TABLET ORAL at 08:29

## 2021-02-09 NOTE — PROGRESS NOTES
Chief Complaint: POD # 5    Subjective   Pt tolerating regular diet, pain controlled, + flatus, no BM   Objective     Vital signs in last 24 hours:  Temp:  [97 °F (36.1 °C)-98.1 °F (36.7 °C)] 97.9 °F (36.6 °C)  Heart Rate:  [78-98] 89  Resp:  [18] 18  BP: (103-118)/(54-68) 112/54    Intake/Output last 3 shifts:  I/O last 3 completed shifts:  In: 1010 [P.O.:960; IV Piggyback:50]  Out: 3564 [Urine:3550; Drains:14]    Intake/Output this shift:  I/O this shift:  In: 360 [P.O.:360]  Out: 462 [Urine:450; Drains:12]    Physical Exam:  Respiratory: CTA, good inspiratory effort  CV: RRR  Abd: + BS, soft, ND, usual post-op tenderness, no rebound, no guarding, incision C/D/I, FAMILIA serous  Results from last 7 days   Lab Units 02/09/21  0742   WBC 10*3/mm3 9.83   HEMOGLOBIN g/dL 7.9*   HEMATOCRIT % 25.3*   PLATELETS 10*3/mm3 415     Results from last 7 days   Lab Units 02/09/21  0742   SODIUM mmol/L 136   POTASSIUM mmol/L 3.6   CHLORIDE mmol/L 95*   CO2 mmol/L 31.4*   BUN mg/dL 12   CREATININE mg/dL 1.03*   CALCIUM mg/dL 9.0   BILIRUBIN mg/dL 0.4   ALK PHOS U/L 502*   ALT (SGPT) U/L 61*   AST (SGOT) U/L 37*   GLUCOSE mg/dL 145*       Assessment/Plan   S/P open cholecystectomy  Leukocytosis - resolved, change to po Augmentin   Await BM    Andie Robison MD  General, Minimally Invasive and Endoscopic Surgery  [facility]    [address] [facility] [address] [facility] [address]    [address]  [place] [address]               [place] [address]    P: [phone]  F: [phone]    Cc:  Hazel Fan MD

## 2021-02-09 NOTE — PROGRESS NOTES
"Daily Progress Note:      Chief complaint: Follow-up of cholecystitis, hypertension, chronic kidney disease, diarrhea    Subjective: Postop day #5 open cholecystectomy tolerating regular diet.  Complains of increased right lower quadrant abdominal pain since yesterday afternoon.  No nausea vomiting      LOS: 6 days     Vital Signs  Temp:  [97 °F (36.1 °C)-98.1 °F (36.7 °C)] 97 °F (36.1 °C)  Heart Rate:  [] 78  Resp:  [16-18] 18  BP: (103-140)/(59-68) 108/68  Oxygen Therapy  SpO2: 95 %  Pulse Oximetry Type: Intermittent  Device (Oxygen Therapy): room air  Flow (L/min): 2  ETCO2 (mmHg): 35 mmHg}  Body mass index is 34.76 kg/m².  Flowsheet Rows      First Filed Value   Admission Height  152.4 cm (60\") Documented at 02/03/2021 1330   Admission Weight  80.7 kg (178 lb) Documented at 02/03/2021 1330                   Documented weights    02/03/21 1330   Weight: 80.7 kg (178 lb)           Patient Vitals for the past 24 hrs:   BP Temp Temp src Pulse Resp SpO2 Weight   02/09/21 0626 108/68 97 °F (36.1 °C) Oral 78 18 95 % --   02/08/21 2100 -- -- -- -- -- 96 % --   02/08/21 2001 103/61 98.1 °F (36.7 °C) Oral 86 18 95 % --   02/08/21 1523 140/60 97.4 °F (36.3 °C) Oral 78 16 96 % --   02/08/21 1315 -- -- -- -- -- 94 % --   02/08/21 1130 114/59 97.6 °F (36.4 °C) Oral 102 18 91 % --       80.7 kg (178 lb)      Intake/Output Summary (Last 24 hours) at 2/9/2021 0725  Last data filed at 2/9/2021 0626  Gross per 24 hour   Intake 770 ml   Output 2404 ml   Net -1634 ml       Review of Systems   Constitutional: Negative for activity change, appetite change and fatigue.   HENT: Negative for congestion.    Respiratory: Negative for cough, chest tightness, shortness of breath and wheezing.    Cardiovascular: Negative for chest pain.   Gastrointestinal: Positive for abdominal pain (Right upper quadrant incisional). Negative for abdominal distention, diarrhea, nausea and vomiting.   Endocrine: Negative for polyphagia and polyuria. "   Genitourinary: Negative for frequency.   Skin: Negative for rash.   Neurological: Negative for light-headedness.   Hematological: Does not bruise/bleed easily.   Psychiatric/Behavioral: Negative for agitation and behavioral problems.       Physical Exam  Vitals signs and nursing note reviewed.   Constitutional:       Appearance: She is well-developed. She is obese.   HENT:      Head: Normocephalic.   Eyes:      Conjunctiva/sclera: Conjunctivae normal.   Neck:      Musculoskeletal: Normal range of motion.      Thyroid: No thyromegaly.      Vascular: No JVD.   Cardiovascular:      Rate and Rhythm: Normal rate and regular rhythm.      Heart sounds: Normal heart sounds. No murmur.   Pulmonary:      Effort: Pulmonary effort is normal. No respiratory distress.      Breath sounds: Normal breath sounds. No wheezing or rales.   Abdominal:      General: Bowel sounds are normal. There is no distension.      Palpations: Abdomen is soft.      Tenderness: There is abdominal tenderness (Right lower quadrant). There is no guarding.   Skin:     General: Skin is warm and dry.      Findings: No rash.   Neurological:      Mental Status: She is alert.         Medication Review:   I have reviewed the patient's current medication list  Scheduled Meds:allopurinol, 100 mg, Oral, BID  atorvastatin, 20 mg, Oral, Daily  cefTRIAXone, 1 g, Intravenous, Q24H  insulin aspart, 0-9 Units, Subcutaneous, 4x Daily AC & at Bedtime  pantoprazole, 40 mg, Oral, QAM  predniSONE, 1 mg, Oral, Daily  pregabalin, 75 mg, Oral, Q12H  sodium chloride, 10 mL, Intravenous, Q12H  sodium chloride, 10 mL, Intravenous, Q12H      Continuous Infusions:   PRN Meds:.•  acetaminophen **OR** acetaminophen  •  dextrose  •  dextrose  •  glucagon (human recombinant)  •  HYDROcodone-acetaminophen  •  HYDROcodone-acetaminophen  •  HYDROmorphone **AND** naloxone  •  ondansetron **OR** ondansetron  •  sodium chloride  •  sodium chloride  •  sodium chloride      Labs:  Results from  last 7 days   Lab Units 02/08/21 0413 02/07/21 0400 02/06/21 0444 02/05/21 0337 02/04/21 0351 02/03/21  1032   WBC 10*3/mm3 11.80* 13.76* 18.29* 28.06* 14.88* 17.74*   HEMOGLOBIN g/dL 8.0* 8.1* 8.4* 10.0* 10.6* 11.7*   HEMATOCRIT % 25.3* 24.8* 26.4* 31.1* 33.4* 36.4   PLATELETS 10*3/mm3 465* 439 478* 512* 430 519*     Results from last 7 days   Lab Units 02/08/21 0413 02/07/21 0400 02/06/21 0444 02/05/21 0337 02/04/21 2013 02/04/21 0351 02/03/21  1032   SODIUM mmol/L 137 135* 135* 134* 133* 136 135*   POTASSIUM mmol/L 3.8 3.9 3.9 4.8 4.0 3.8 3.6   CHLORIDE mmol/L 96* 95* 95* 94* 92* 94* 93*   CO2 mmol/L 32.2* 31.7* 30.4* 25.4 23.0 30.5* 29.5*   BUN mg/dL 12 19 30* 30* 24* 24* 29*   CREATININE mg/dL 1.01* 1.03* 1.31* 1.83* 1.45* 1.16* 1.40*   CALCIUM mg/dL 9.0 9.0 8.8 9.0 9.3 9.5 10.0   BILIRUBIN mg/dL 0.5 0.5 0.3 0.5  --  0.6 0.6   ALK PHOS U/L 520* 658* 288* 375*  --  455* 487*   ALT (SGPT) U/L 79* 125* 45* 65*  --  45* 43*   AST (SGOT) U/L 60* 153* 40* 78*  --  45* 39*   GLUCOSE mg/dL 142* 139* 142* 193* 183* 116* 158*           Results from last 7 days   Lab Units 02/08/21 0413 02/07/21 0400 02/06/21 0444 02/03/21  1540   AST (SGOT) U/L 60* 153* 40*   < >  --    ALT (SGPT) U/L 79* 125* 45*   < >  --    LACTATE mmol/L  --   --   --   --  1.0   PLATELETS 10*3/mm3 465* 439 478*   < >  --     < > = values in this interval not displayed.         Lab Results (last 24 hours)     Procedure Component Value Units Date/Time    POC Glucose Once [[id number]]  (Abnormal) Collected: 02/08/21 1716    Specimen: Blood Updated: 02/08/21 1723     Glucose 188 mg/dL     Tissue Pathology Exam [[id number]] Collected: 02/04/21 1651    Specimen: Tissue from Gallbladder Updated: 02/08/21 1223     Case Report --     Surgical Pathology Report                         Case: [id number]                                  Authorizing Provider:  Andie Robison MD     Collected:           02/04/2021 04:51 PM          Ordering  "Location:     [facility] ANAT   Received:            02/04/2021 05:41 PM                                 OR                                                                           Pathologist:           Donny Duong MD                                                         Specimen:    Gallbladder                                                                                 Final Diagnosis --     1. Gallbladder, Cholecystectomy:  Benign gallbladder with   A. Acute necrotizing cholecystitis.   B. Extensive abscess formation.     Terell/kds       Comment --     There is a fragment of liver tissue with acute inflammation extending into the liver tissue and acute inflammation of reactive fibrous tissue around the liver tissue.    Terell/kds       Gross Description --     1.  Received in formalin labeled with the patient's name and designated \"gallbladder\".  The specimen consists of a pink to red distorted gallbladder that was received opened devoid of contents.  The gallbladder is in 3 fragments ranging in size from 2.6 cm up to 10.5 x 7.2 x 3.8 cm.  All the fragments are rubbery and diffusely erythematous.  The smaller fragments are unable to be oriented.  The largest fragment is focally disrupted at the fundus.  The serosal surface is wrinkled and slightly nodular.  The hepatic surface is markedly granular.  The fundus area of disruption measures approximately 4.3 x 3.5 cm.  The cystic duct appears intact and appears to be occluded.  Further opening the gallbladder reveals a minimal amount of green to yellow purulent material and clotted blood.  The gallbladder wall is variably thickened and diffusely indurated measuring up to 1.7 cm in maximum thickness.  The lumen is pink to red and trabecular.  No mass or polyp formation are grossly identified.  Sectioning through the gallbladder wall reveals multiple areas of hemorrhage.  Near the gallbladder neck the tissue is slightly more edematous and has a green " tinge to a portion of the tissue.  The specimen is submitted as follows:  1A - cystic duct and representative section near gallbladder neck.  1B - other representative sections near the gallbladder neck.  1C - representative sections from mid body.  1D - representative sections at the fundus.      Total blocks this case: 4.     mb/o/liban/brb      POC Glucose Once [[id number]]  (Abnormal) Collected: 02/08/21 1148    Specimen: Blood Updated: 02/08/21 1206     Glucose 223 mg/dL     POC Glucose Once [[id number]]  (Abnormal) Collected: 02/08/21 0802    Specimen: Blood Updated: 02/08/21 0809     Glucose 145 mg/dL                                         Glucose   Date/Time Value Ref Range Status   02/08/2021 1716 188 (H) 70 - 130 mg/dL Final   02/08/2021 1148 223 (H) 70 - 130 mg/dL Final   02/08/2021 0802 145 (H) 70 - 130 mg/dL Final   02/08/2021 0326 145 (H) 70 - 130 mg/dL Final   02/07/2021 1625 181 (H) 70 - 130 mg/dL Final   02/07/2021 1109 192 (H) 70 - 130 mg/dL Final   02/07/2021 0711 132 (H) 70 - 130 mg/dL Final   02/06/2021 1651 193 (H) 70 - 130 mg/dL Final     Results from last 7 days   Lab Units 02/03/21  1540   LACTATE mmol/L 1.0             Results from last 7 days   Lab Units 02/03/21  1627   ADENOVIRUS  Not Detected         Radiology:  Imaging Results (Last 24 Hours)     ** No results found for the last 24 hours. **          Cardiology:  ECG/EMG Results (last 24 hours)     ** No results found for the last 24 hours. **          I have reviewed recent labs results and consult notes.  Parts of this note may have been copied and pasted but patient was examined and interviewed by me today    Assessment and Plan:  1.  Acute gangrenous cholecystitis postop day #5  status post open cholecystectomy white count is improving liver enzymes improving tolerating regular diet.  Complains of increasing right lower quadrant pain await surgical evaluation    2.    Acute kidney injury chronic kidney disease stage III renal  functions are almost back to baseline continue monitor renal functions    3.  Adult-onset diabetes mellitus insulin-dependent controlled continue with Accu-Chek sliding cell insulin     4.  Hypertension normotensive off medications        5.    Anemia stable postop    6.  History of pulmonary sarcoidosis in remission continue with low-dose steroids      7.  DVT prophylaxis SCD    Much of this encounter note is an electronic transcription/translation of spoken language to printed text using Dragon Software

## 2021-02-09 NOTE — PLAN OF CARE
Goal Outcome Evaluation:  Plan of Care Reviewed With: patient  Progress: improving  Outcome Summary: Pt up to chair and ambulates in room. Continue IV Rocephin. Tolerating diet w/o complaints. FAMILIA w/ sersang output. Norco for pain.

## 2021-02-10 LAB
ALBUMIN SERPL-MCNC: 2.7 G/DL (ref 3.5–5.2)
ALBUMIN/GLOB SERPL: 0.7 G/DL
ALP SERPL-CCNC: 468 U/L (ref 39–117)
ALT SERPL W P-5'-P-CCNC: 52 U/L (ref 1–33)
ANION GAP SERPL CALCULATED.3IONS-SCNC: 9.3 MMOL/L (ref 5–15)
AST SERPL-CCNC: 32 U/L (ref 1–32)
BILIRUB SERPL-MCNC: 0.4 MG/DL (ref 0–1.2)
BUN SERPL-MCNC: 11 MG/DL (ref 8–23)
BUN/CREAT SERPL: 10.9 (ref 7–25)
CALCIUM SPEC-SCNC: 8.9 MG/DL (ref 8.6–10.5)
CHLORIDE SERPL-SCNC: 95 MMOL/L (ref 98–107)
CO2 SERPL-SCNC: 30.7 MMOL/L (ref 22–29)
CREAT SERPL-MCNC: 1.01 MG/DL (ref 0.57–1)
DEPRECATED RDW RBC AUTO: 45.1 FL (ref 37–54)
ERYTHROCYTE [DISTWIDTH] IN BLOOD BY AUTOMATED COUNT: 13.2 % (ref 12.3–15.4)
GFR SERPL CREATININE-BSD FRML MDRD: 55 ML/MIN/1.73
GLOBULIN UR ELPH-MCNC: 4 GM/DL
GLUCOSE BLDC GLUCOMTR-MCNC: 137 MG/DL (ref 70–130)
GLUCOSE BLDC GLUCOMTR-MCNC: 165 MG/DL (ref 70–130)
GLUCOSE BLDC GLUCOMTR-MCNC: 187 MG/DL (ref 70–130)
GLUCOSE BLDC GLUCOMTR-MCNC: 190 MG/DL (ref 70–130)
GLUCOSE SERPL-MCNC: 112 MG/DL (ref 65–99)
HCT VFR BLD AUTO: 27.1 % (ref 34–46.6)
HGB BLD-MCNC: 8.4 G/DL (ref 12–15.9)
MCH RBC QN AUTO: 29.2 PG (ref 26.6–33)
MCHC RBC AUTO-ENTMCNC: 31 G/DL (ref 31.5–35.7)
MCV RBC AUTO: 94.1 FL (ref 79–97)
PLATELET # BLD AUTO: 484 10*3/MM3 (ref 140–450)
PMV BLD AUTO: 9 FL (ref 6–12)
POTASSIUM SERPL-SCNC: 3.7 MMOL/L (ref 3.5–5.2)
PROT SERPL-MCNC: 6.7 G/DL (ref 6–8.5)
RBC # BLD AUTO: 2.88 10*6/MM3 (ref 3.77–5.28)
SODIUM SERPL-SCNC: 135 MMOL/L (ref 136–145)
WBC # BLD AUTO: 9.68 10*3/MM3 (ref 3.4–10.8)

## 2021-02-10 PROCEDURE — 63710000001 PREDNISONE PER 5 MG: Performed by: SURGERY

## 2021-02-10 PROCEDURE — 80053 COMPREHEN METABOLIC PANEL: CPT | Performed by: FAMILY MEDICINE

## 2021-02-10 PROCEDURE — 63710000001 INSULIN ASPART PER 5 UNITS: Performed by: FAMILY MEDICINE

## 2021-02-10 PROCEDURE — 85027 COMPLETE CBC AUTOMATED: CPT | Performed by: FAMILY MEDICINE

## 2021-02-10 PROCEDURE — 99024 POSTOP FOLLOW-UP VISIT: CPT | Performed by: SURGERY

## 2021-02-10 PROCEDURE — 82962 GLUCOSE BLOOD TEST: CPT

## 2021-02-10 RX ORDER — BISACODYL 10 MG
10 SUPPOSITORY, RECTAL RECTAL DAILY
Status: DISCONTINUED | OUTPATIENT
Start: 2021-02-10 | End: 2021-02-11 | Stop reason: HOSPADM

## 2021-02-10 RX ORDER — OXYCODONE HYDROCHLORIDE AND ACETAMINOPHEN 5; 325 MG/1; MG/1
1 TABLET ORAL EVERY 4 HOURS PRN
Qty: 30 TABLET | Refills: 0 | Status: SHIPPED | OUTPATIENT
Start: 2021-02-10 | End: 2021-02-15

## 2021-02-10 RX ORDER — AMOXICILLIN AND CLAVULANATE POTASSIUM 875; 125 MG/1; MG/1
1 TABLET, FILM COATED ORAL EVERY 12 HOURS SCHEDULED
Qty: 12 TABLET | Refills: 0 | Status: SHIPPED | OUTPATIENT
Start: 2021-02-10 | End: 2021-02-16

## 2021-02-10 RX ADMIN — PREDNISONE 1 MG: 1 TABLET ORAL at 09:16

## 2021-02-10 RX ADMIN — HYDROCODONE BITARTRATE AND ACETAMINOPHEN 1 TABLET: 7.5; 325 TABLET ORAL at 13:05

## 2021-02-10 RX ADMIN — AMOXICILLIN AND CLAVULANATE POTASSIUM 1 TABLET: 875; 125 TABLET, FILM COATED ORAL at 09:16

## 2021-02-10 RX ADMIN — BISACODYL 10 MG: 10 SUPPOSITORY RECTAL at 09:16

## 2021-02-10 RX ADMIN — HYDROCODONE BITARTRATE AND ACETAMINOPHEN 1 TABLET: 7.5; 325 TABLET ORAL at 22:26

## 2021-02-10 RX ADMIN — INSULIN ASPART 2 UNITS: 100 INJECTION, SOLUTION INTRAVENOUS; SUBCUTANEOUS at 18:36

## 2021-02-10 RX ADMIN — SODIUM CHLORIDE, PRESERVATIVE FREE 10 ML: 5 INJECTION INTRAVENOUS at 09:17

## 2021-02-10 RX ADMIN — SODIUM CHLORIDE, PRESERVATIVE FREE 10 ML: 5 INJECTION INTRAVENOUS at 09:16

## 2021-02-10 RX ADMIN — PREGABALIN 75 MG: 75 CAPSULE ORAL at 21:31

## 2021-02-10 RX ADMIN — INSULIN ASPART 2 UNITS: 100 INJECTION, SOLUTION INTRAVENOUS; SUBCUTANEOUS at 12:51

## 2021-02-10 RX ADMIN — ALLOPURINOL 100 MG: 100 TABLET ORAL at 09:16

## 2021-02-10 RX ADMIN — PREGABALIN 75 MG: 75 CAPSULE ORAL at 09:16

## 2021-02-10 RX ADMIN — AMOXICILLIN AND CLAVULANATE POTASSIUM 1 TABLET: 875; 125 TABLET, FILM COATED ORAL at 21:31

## 2021-02-10 RX ADMIN — ALLOPURINOL 100 MG: 100 TABLET ORAL at 21:31

## 2021-02-10 RX ADMIN — GLIPIZIDE 2.5 MG: 5 TABLET ORAL at 09:16

## 2021-02-10 RX ADMIN — PANTOPRAZOLE SODIUM 40 MG: 40 TABLET, DELAYED RELEASE ORAL at 06:13

## 2021-02-10 RX ADMIN — INSULIN ASPART 2 UNITS: 100 INJECTION, SOLUTION INTRAVENOUS; SUBCUTANEOUS at 21:59

## 2021-02-10 RX ADMIN — ATORVASTATIN CALCIUM 20 MG: 20 TABLET, FILM COATED ORAL at 21:31

## 2021-02-10 RX ADMIN — HYDROCODONE BITARTRATE AND ACETAMINOPHEN 1 TABLET: 7.5; 325 TABLET ORAL at 04:08

## 2021-02-10 RX ADMIN — GLIPIZIDE 2.5 MG: 5 TABLET ORAL at 18:35

## 2021-02-10 RX ADMIN — SODIUM CHLORIDE, PRESERVATIVE FREE 10 ML: 5 INJECTION INTRAVENOUS at 21:33

## 2021-02-10 RX ADMIN — HYDROCODONE BITARTRATE AND ACETAMINOPHEN 1 TABLET: 7.5; 325 TABLET ORAL at 18:47

## 2021-02-10 NOTE — PROGRESS NOTES
"Daily Progress Note:      Chief complaint: Follow-up of cholecystitis, hypertension, chronic kidney disease, diarrhea    Subjective: Postop day #6 open cholecystectomy still complains of right lower quadrant abdominal pain.  No fever nausea or vomiting tolerating regular diet has had no vomiting for 3 days      LOS: 7 days     Vital Signs  Temp:  [97.6 °F (36.4 °C)-98.2 °F (36.8 °C)] 98.2 °F (36.8 °C)  Heart Rate:  [81-98] 81  Resp:  [18] 18  BP: (112-142)/(54-67) 142/67  Oxygen Therapy  SpO2: 95 %  Pulse Oximetry Type: Intermittent  Device (Oxygen Therapy): room air  Flow (L/min): 2  ETCO2 (mmHg): 35 mmHg}  Body mass index is 35.66 kg/m².  Flowsheet Rows      First Filed Value   Admission Height  152.4 cm (60\") Documented at 02/03/2021 1330   Admission Weight  80.7 kg (178 lb) Documented at 02/03/2021 1330                   Documented weights    02/03/21 1330 02/10/21 0536   Weight: 80.7 kg (178 lb) 82.8 kg (182 lb 9.6 oz)           Patient Vitals for the past 24 hrs:   BP Temp Temp src Pulse Resp SpO2 Weight   02/10/21 0536 142/67 98.2 °F (36.8 °C) Oral 81 18 95 % 82.8 kg (182 lb 9.6 oz)   02/09/21 1933 126/60 97.6 °F (36.4 °C) Oral 92 18 97 % --   02/09/21 1535 112/54 97.9 °F (36.6 °C) Oral 89 18 95 % --   02/09/21 1131 118/58 97.9 °F (36.6 °C) Oral 98 18 94 % --       82.8 kg (182 lb 9.6 oz)      Intake/Output Summary (Last 24 hours) at 2/10/2021 0735  Last data filed at 2/10/2021 0536  Gross per 24 hour   Intake 600 ml   Output 1362 ml   Net -762 ml       Review of Systems   Constitutional: Negative for activity change, appetite change and fatigue.   HENT: Negative for congestion.    Respiratory: Negative for cough, chest tightness, shortness of breath and wheezing.    Cardiovascular: Negative for chest pain.   Gastrointestinal: Positive for abdominal pain (Right upper quadrant incisional). Negative for abdominal distention, diarrhea, nausea and vomiting.   Endocrine: Negative for polyphagia and polyuria. "   Genitourinary: Negative for frequency.   Skin: Negative for rash.   Neurological: Negative for light-headedness.   Hematological: Does not bruise/bleed easily.   Psychiatric/Behavioral: Negative for agitation and behavioral problems.       Physical Exam  Vitals signs and nursing note reviewed.   Constitutional:       Appearance: She is well-developed. She is obese.   HENT:      Head: Normocephalic.   Eyes:      Conjunctiva/sclera: Conjunctivae normal.   Neck:      Musculoskeletal: Normal range of motion.      Thyroid: No thyromegaly.      Vascular: No JVD.   Cardiovascular:      Rate and Rhythm: Normal rate and regular rhythm.      Heart sounds: Normal heart sounds. No murmur.   Pulmonary:      Effort: Pulmonary effort is normal. No respiratory distress.      Breath sounds: Normal breath sounds. No wheezing or rales.   Abdominal:      General: Bowel sounds are normal. There is no distension.      Palpations: Abdomen is soft.      Tenderness: There is abdominal tenderness (Right lower quadrant). There is no guarding.   Skin:     General: Skin is warm and dry.      Findings: No rash.   Neurological:      Mental Status: She is alert.         Medication Review:   I have reviewed the patient's current medication list  Scheduled Meds:allopurinol, 100 mg, Oral, BID  amoxicillin-clavulanate, 1 tablet, Oral, Q12H  atorvastatin, 20 mg, Oral, Nightly  glipizide, 2.5 mg, Oral, BID AC  insulin aspart, 0-9 Units, Subcutaneous, 4x Daily AC & at Bedtime  pantoprazole, 40 mg, Oral, QAM  predniSONE, 1 mg, Oral, Daily  pregabalin, 75 mg, Oral, Q12H  sodium chloride, 10 mL, Intravenous, Q12H  sodium chloride, 10 mL, Intravenous, Q12H      Continuous Infusions:   PRN Meds:.•  acetaminophen **OR** acetaminophen  •  dextrose  •  dextrose  •  glucagon (human recombinant)  •  HYDROcodone-acetaminophen  •  HYDROcodone-acetaminophen  •  HYDROmorphone **AND** naloxone  •  ondansetron **OR** ondansetron  •  sodium chloride  •  sodium  chloride  •  sodium chloride      Labs:  Results from last 7 days   Lab Units 02/10/21  0409 02/09/21  0742 02/08/21  0413 02/07/21  0400 02/06/21  0444 02/05/21  0337 02/04/21  0351   WBC 10*3/mm3 9.68 9.83 11.80* 13.76* 18.29* 28.06* 14.88*   HEMOGLOBIN g/dL 8.4* 7.9* 8.0* 8.1* 8.4* 10.0* 10.6*   HEMATOCRIT % 27.1* 25.3* 25.3* 24.8* 26.4* 31.1* 33.4*   PLATELETS 10*3/mm3 484* 415 465* 439 478* 512* 430     Results from last 7 days   Lab Units 02/10/21  0409 02/09/21  0742 02/08/21 0413 02/07/21 0400 02/06/21 0444 02/05/21 0337 02/04/21 2013 02/04/21  0351   SODIUM mmol/L 135* 136 137 135* 135* 134* 133* 136   POTASSIUM mmol/L 3.7 3.6 3.8 3.9 3.9 4.8 4.0 3.8   CHLORIDE mmol/L 95* 95* 96* 95* 95* 94* 92* 94*   CO2 mmol/L 30.7* 31.4* 32.2* 31.7* 30.4* 25.4 23.0 30.5*   BUN mg/dL 11 12 12 19 30* 30* 24* 24*   CREATININE mg/dL 1.01* 1.03* 1.01* 1.03* 1.31* 1.83* 1.45* 1.16*   CALCIUM mg/dL 8.9 9.0 9.0 9.0 8.8 9.0 9.3 9.5   BILIRUBIN mg/dL 0.4 0.4 0.5 0.5 0.3 0.5  --  0.6   ALK PHOS U/L 468* 502* 520* 658* 288* 375*  --  455*   ALT (SGPT) U/L 52* 61* 79* 125* 45* 65*  --  45*   AST (SGOT) U/L 32 37* 60* 153* 40* 78*  --  45*   GLUCOSE mg/dL 112* 145* 142* 139* 142* 193* 183* 116*           Results from last 7 days   Lab Units 02/10/21  0409 02/09/21  0742 02/08/21  0413  02/03/21  1540   AST (SGOT) U/L 32 37* 60*   < >  --    ALT (SGPT) U/L 52* 61* 79*   < >  --    LACTATE mmol/L  --   --   --   --  1.0   PLATELETS 10*3/mm3 484* 415 465*   < >  --     < > = values in this interval not displayed.         Lab Results (last 24 hours)     Procedure Component Value Units Date/Time    Comprehensive Metabolic Panel [[id number]]  (Abnormal) Collected: 02/10/21 0409    Specimen: Blood Updated: 02/10/21 0454     Glucose 112 mg/dL      BUN 11 mg/dL      Creatinine 1.01 mg/dL      Sodium 135 mmol/L      Potassium 3.7 mmol/L      Chloride 95 mmol/L      CO2 30.7 mmol/L      Calcium 8.9 mg/dL      Total Protein 6.7 g/dL       Albumin 2.70 g/dL      ALT (SGPT) 52 U/L      AST (SGOT) 32 U/L      Alkaline Phosphatase 468 U/L      Total Bilirubin 0.4 mg/dL      eGFR Non African Amer 55 mL/min/1.73      Globulin 4.0 gm/dL      A/G Ratio 0.7 g/dL      BUN/Creatinine Ratio 10.9     Anion Gap 9.3 mmol/L     Narrative:      GFR Normal >60  Chronic Kidney Disease <60  Kidney Failure <15      CBC (No Diff) [[id number]]  (Abnormal) Collected: 02/10/21 0409    Specimen: Blood Updated: 02/10/21 0429     WBC 9.68 10*3/mm3      RBC 2.88 10*6/mm3      Hemoglobin 8.4 g/dL      Hematocrit 27.1 %      MCV 94.1 fL      MCH 29.2 pg      MCHC 31.0 g/dL      RDW 13.2 %      RDW-SD 45.1 fl      MPV 9.0 fL      Platelets 484 10*3/mm3     POC Glucose Once [[id number]]  (Abnormal) Collected: 02/09/21 2020    Specimen: Blood Updated: 02/09/21 2027     Glucose 219 mg/dL     POC Glucose Once [[id number]]  (Normal) Collected: 02/09/21 1629    Specimen: Blood Updated: 02/09/21 1637     Glucose 117 mg/dL     POC Glucose Once [[id number]]  (Abnormal) Collected: 02/09/21 1145    Specimen: Blood Updated: 02/09/21 1205     Glucose 231 mg/dL     Comprehensive Metabolic Panel [[id number]]  (Abnormal) Collected: 02/09/21 0742    Specimen: Blood Updated: 02/09/21 0814     Glucose 145 mg/dL      BUN 12 mg/dL      Creatinine 1.03 mg/dL      Sodium 136 mmol/L      Potassium 3.6 mmol/L      Chloride 95 mmol/L      CO2 31.4 mmol/L      Calcium 9.0 mg/dL      Total Protein 6.4 g/dL      Albumin 2.60 g/dL      ALT (SGPT) 61 U/L      AST (SGOT) 37 U/L      Alkaline Phosphatase 502 U/L      Total Bilirubin 0.4 mg/dL      eGFR Non African Amer 54 mL/min/1.73      Globulin 3.8 gm/dL      A/G Ratio 0.7 g/dL      BUN/Creatinine Ratio 11.7     Anion Gap 9.6 mmol/L     Narrative:      GFR Normal >60  Chronic Kidney Disease <60  Kidney Failure <15      CBC (No Diff) [[id number]]  (Abnormal) Collected: 02/09/21 0742    Specimen: Blood Updated: 02/09/21 0751     WBC 9.83 10*3/mm3      RBC 2.72 10*6/mm3       Hemoglobin 7.9 g/dL      Hematocrit 25.3 %      MCV 93.0 fL      MCH 29.0 pg      MCHC 31.2 g/dL      RDW 13.0 %      RDW-SD 44.3 fl      MPV 8.8 fL      Platelets 415 10*3/mm3     POC Glucose Once [[id number]]  (Abnormal) Collected: 02/09/21 0733    Specimen: Blood Updated: 02/09/21 0742     Glucose 160 mg/dL                                         Glucose   Date/Time Value Ref Range Status   02/09/2021 2020 219 (H) 70 - 130 mg/dL Final   02/09/2021 1629 117 70 - 130 mg/dL Final   02/09/2021 1145 231 (H) 70 - 130 mg/dL Final   02/09/2021 0733 160 (H) 70 - 130 mg/dL Final   02/08/2021 1716 188 (H) 70 - 130 mg/dL Final   02/08/2021 1148 223 (H) 70 - 130 mg/dL Final   02/08/2021 0802 145 (H) 70 - 130 mg/dL Final   02/08/2021 0326 145 (H) 70 - 130 mg/dL Final     Results from last 7 days   Lab Units 02/03/21  1540   LACTATE mmol/L 1.0             Results from last 7 days   Lab Units 02/03/21  1627   ADENOVIRUS  Not Detected         Radiology:  Imaging Results (Last 24 Hours)     ** No results found for the last 24 hours. **          Cardiology:  ECG/EMG Results (last 24 hours)     ** No results found for the last 24 hours. **          I have reviewed recent labs results and consult notes.  Parts of this note may have been copied and pasted but patient was examined and interviewed by me today    Assessment and Plan:  1.  Acute gangrenous cholecystitis postop day #5  status post open cholecystectomy.  Leukocytosis is resolved.  Liver enzymes are decreasing.  Still with no BM has good bowel sounds we will try suppository today    2.    Kidney disease stage III at baseline    3.  Adult-onset diabetes mellitus insulin-dependent controlled continue with Accu-Chek sliding cell insulin.  Oral hypoglycemic started yesterday remains off long-acting insulin     4.  Hypertension normotensive off medications        5.    Anemia stable postop    6.  History of pulmonary sarcoidosis in remission continue with low-dose  steroids      7.  DVT prophylaxis SCD    Much of this encounter note is an electronic transcription/translation of spoken language to printed text using Dragon Software

## 2021-02-10 NOTE — PLAN OF CARE
Problem: Adult Inpatient Plan of Care  Goal: Plan of Care Review  Outcome: Ongoing, Progressing  Flowsheets (Taken 2/10/2021 1621)  Plan of Care Reviewed With: patient  Outcome Summary: pt has suppository and had 2 BM's afterwards. Per Figert she can go home, awaiting call back from [facility]. Pain meds given as requested.   Goal Outcome Evaluation:  Plan of Care Reviewed With: patient     Outcome Summary: pt has suppository and had 2 BM's afterwards. Per Figert she can go home, awaiting call back from [facility]. Pain meds given as requested.

## 2021-02-10 NOTE — PROGRESS NOTES
Chief Complaint: POD # 6    Subjective   Pt tolerating reg diet, +flatus, no BM  Objective     Vital signs in last 24 hours:  Temp:  [97.6 °F (36.4 °C)-98.2 °F (36.8 °C)] 98.2 °F (36.8 °C)  Heart Rate:  [81-98] 81  Resp:  [18] 18  BP: (112-142)/(54-67) 142/67    Intake/Output last 3 shifts:  I/O last 3 completed shifts:  In: 600 [P.O.:600]  Out: 2662 [Urine:2650; Drains:12]    Intake/Output this shift:  No intake/output data recorded.    Physical Exam:  Respiratory: CTA, good inspiratory effort  CV: RRR  Abd: + BS, soft, ND, usual post-op tenderness, no rebound, no guarding, incision C/D/I, FAMILIA serous  Results from last 7 days   Lab Units 02/10/21  0409   WBC 10*3/mm3 9.68   HEMOGLOBIN g/dL 8.4*   HEMATOCRIT % 27.1*   PLATELETS 10*3/mm3 484*     Results from last 7 days   Lab Units 02/10/21  0409   SODIUM mmol/L 135*   POTASSIUM mmol/L 3.7   CHLORIDE mmol/L 95*   CO2 mmol/L 30.7*   BUN mg/dL 11   CREATININE mg/dL 1.01*   CALCIUM mg/dL 8.9   BILIRUBIN mg/dL 0.4   ALK PHOS U/L 468*   ALT (SGPT) U/L 52*   AST (SGOT) U/L 32   GLUCOSE mg/dL 112*       Assessment/Plan   S/P  open cholecystectomy  Leukocytosis - resolved, change to po Augmentin   Await BM - triica Robison MD  General, Minimally Invasive and Endoscopic Surgery  [facility]    [address] [facility] [address] [place] [address]    [address]  [place] [address]               [place] [address]    P: [phone]  F: [phone]    Cc:  Hazel Fan MD

## 2021-02-10 NOTE — PLAN OF CARE
Goal Outcome Evaluation:  Plan of Care Reviewed With: patient     Outcome Summary: Patient VSS, complaints of pain controlled with po prn meds. Ambulating to bathroom without difficulty. Dressing and FAMILIA drain in place. Resting in bed quietly at this time.

## 2021-02-11 VITALS
OXYGEN SATURATION: 100 % | TEMPERATURE: 97.8 F | BODY MASS INDEX: 35.02 KG/M2 | HEIGHT: 60 IN | SYSTOLIC BLOOD PRESSURE: 110 MMHG | DIASTOLIC BLOOD PRESSURE: 69 MMHG | HEART RATE: 85 BPM | RESPIRATION RATE: 18 BRPM | WEIGHT: 178.4 LBS

## 2021-02-11 PROBLEM — D86.9 SARCOIDOSIS: Status: ACTIVE | Noted: 2021-02-11

## 2021-02-11 PROBLEM — I10 ESSENTIAL HYPERTENSION: Status: ACTIVE | Noted: 2021-02-11

## 2021-02-11 PROBLEM — E11.21 DIABETES MELLITUS WITH NEPHROPATHY (HCC): Status: ACTIVE | Noted: 2021-02-11

## 2021-02-11 LAB
GLUCOSE BLDC GLUCOMTR-MCNC: 147 MG/DL (ref 70–130)
GLUCOSE BLDC GLUCOMTR-MCNC: 158 MG/DL (ref 70–130)

## 2021-02-11 PROCEDURE — 82962 GLUCOSE BLOOD TEST: CPT

## 2021-02-11 PROCEDURE — 63710000001 PREDNISONE PER 5 MG: Performed by: SURGERY

## 2021-02-11 PROCEDURE — 99024 POSTOP FOLLOW-UP VISIT: CPT | Performed by: SURGERY

## 2021-02-11 RX ORDER — HYDROCODONE BITARTRATE AND ACETAMINOPHEN 5; 325 MG/1; MG/1
1 TABLET ORAL EVERY 6 HOURS PRN
Qty: 15 TABLET | Refills: 0 | Status: SHIPPED | OUTPATIENT
Start: 2021-02-11 | End: 2021-02-15

## 2021-02-11 RX ORDER — INSULIN DEGLUDEC 200 U/ML
10 INJECTION, SOLUTION SUBCUTANEOUS DAILY
Start: 2021-02-11

## 2021-02-11 RX ORDER — ATENOLOL AND CHLORTHALIDONE TABLET 50; 25 MG/1; MG/1
0.5 TABLET ORAL DAILY
Start: 2021-02-11

## 2021-02-11 RX ORDER — SACCHAROMYCES BOULARDII 250 MG
250 CAPSULE ORAL 2 TIMES DAILY
Qty: 30 CAPSULE | Refills: 0 | Status: SHIPPED | OUTPATIENT
Start: 2021-02-11

## 2021-02-11 RX ADMIN — PREDNISONE 1 MG: 1 TABLET ORAL at 09:23

## 2021-02-11 RX ADMIN — GLIPIZIDE 2.5 MG: 5 TABLET ORAL at 06:33

## 2021-02-11 RX ADMIN — AMOXICILLIN AND CLAVULANATE POTASSIUM 1 TABLET: 875; 125 TABLET, FILM COATED ORAL at 09:23

## 2021-02-11 RX ADMIN — PREGABALIN 75 MG: 75 CAPSULE ORAL at 09:23

## 2021-02-11 RX ADMIN — PANTOPRAZOLE SODIUM 40 MG: 40 TABLET, DELAYED RELEASE ORAL at 06:33

## 2021-02-11 RX ADMIN — ALLOPURINOL 100 MG: 100 TABLET ORAL at 09:23

## 2021-02-11 RX ADMIN — SODIUM CHLORIDE, PRESERVATIVE FREE 10 ML: 5 INJECTION INTRAVENOUS at 09:23

## 2021-02-11 RX ADMIN — HYDROCODONE BITARTRATE AND ACETAMINOPHEN 1 TABLET: 7.5; 325 TABLET ORAL at 06:33

## 2021-02-11 NOTE — PROGRESS NOTES
Adult Nutrition  Assessment/PES    Patient Name:  Lisbeth Lucas  YOB: 1955  MRN: [id number]  Admit Date:  2/3/2021    Assessment Date:  2/11/2021    Comments:  Pt tolerating diet with good intake.    Reason for Assessment     Row Name 02/11/21 1617          Reason for Assessment    Reason For Assessment  follow-up protocol     Diagnosis  -- status post open cholecystectomy, history of diabetes, hypertension and hyperlipidemia         Nutrition/Diet History     Row Name 02/11/21 1618          Nutrition/Diet History    Typical Food/Fluid Intake  tolerating diet with no complaints           Labs/Tests/Procedures/Meds     Row Name 02/11/21 1619          Labs/Procedures/Meds    Lab Results Reviewed  reviewed        Medications    Pertinent Medications Reviewed  reviewed             Nutrition Prescription Ordered     Row Name 02/11/21 1620          Nutrition Prescription PO    Common Modifiers  Cardiac;Consistent Carbohydrate         Evaluation of Received Nutrient/Fluid Intake     Row Name 02/11/21 1620          PO Evaluation    Number of Meals  4     % PO Intake  94%               Problem/Interventions:  Problem 1     Row Name 02/11/21 1621          Nutrition Diagnoses Problem 1    Problem 1  Altered GI Function     Etiology (related to)  Medical Diagnosis     Signs/Symptoms (evidenced by)  Report/Observation               Intervention Goal     Row Name 02/11/21 1621          Intervention Goal    PO  Maintain intake;Tolerate PO     PO Intake %  75 % or greater of meals         Nutrition Intervention     Row Name 02/11/21 1621          Nutrition Intervention    RD/Tech Action  Follow Tx progress           Education/Evaluation     Row Name 02/11/21 1622          Education    Education  Education offered and refused deferred heart healthy, diet for diabetes education        Monitor/Evaluation    Monitor  I&O;PO intake;Pertinent labs;Weight;Skin status           Electronically signed by:  Alla Skaggs  RD  02/11/21 16:22 EST

## 2021-02-11 NOTE — DISCHARGE SUMMARY
Lisbeth Lucas  1955  [id number]        Discharge Summary    Date of Admission: 2/3/2021  Date of Discharge:  2/11/2021    Primary Discharge Diagnoses:     Acute gangrenous cholecystitis  Acute kidney injury chronic kidney disease stage III resolved  Gastroenteritis  Mild postoperative ileus    Secondary Discharge Diagnoses:     Hypertension  Hyperlipidemia  Adult-onset diabetes mellitus insulin-dependent  Sarcoidosis  Hyperuricemia    PCP  Patient Care Team:  Hazel Fan MD as PCP - General  Hazel Fan MD as PCP - Family Medicine    Consults:   Consults     Date and Time Order Name Status Description    2/3/2021 1639 Inpatient General Surgery Consult              History of Present Illness:  66-year-old white female with history of diabetes hypertension hyperlipidemia chronic kidney disease who became ill a week ago with nausea 1 episode of vomiting and fatigue.  She had several episodes of diarrhea was seen in urgent care diagnosed with a viral infection was Covid negative.  Patient continued feeling extremely fatigued and worn out  throughout the week with intermittent episodes of diarrhea was seen in my office on 2/1/2021.  Exam was unremarkable but labs done in the office showed worsening renal functions with creatinine 1.6 baseline being between 1.2-1.3 and BUN elevated to 50 elevated liver enzymes are marked elevated alk phos..  She denies any abdominal pain vomiting.  Initial plan for with the patient to get some IV fluids and be discharged home but her white count came back 17,000 and CT of the abdomen and ultrasound gallbladder showed possible gangrenous cholecystitis.  Patient is being admitted for further evaluation and treatment.  She has no abdominal symptoms consistent with chronic cholecystits or cholelithiasis.    Hospital Course     Patient admitted to hospital start IV fluids and pancultured.  Surgical consultation was obtained she was taken the operating  room on 2/4/2021 for laparoscopic converted to open cholecystectomy.  Postoperative course was uneventful except for mild ileus which resolved.  Her diet was advanced and she was tolerating a diet and having bowel was at time of discharge.  She was ambulating without any problems.  Her blood pressure remained stable off of medications.  Renal function remained stable.  Leukocytosis resolved.  Liver enzymes are improving.  Patient did have some postoperative anemia with hemoglobin between 8 and 8.5 which stayed stable.    She is discharged home satisfied condition on p.o. Augmentin for 5 days as well as a probiotic.  Her insulin was decreased to 10 units daily and her diuretic was decreased to 1/2 tablet daily.  She will have a office visit with me next week.  And with Dr. Robison as instructed.      Operations and Procedures Performed:  Procedure(s):  CHOLECYSTECTOMY LAPAROSCOPIC converted to open cholecystectomy     Ct Abdomen Pelvis Without Contrast    Result Date: 2/3/2021  Narrative: CT Abdomen Pelvis WO INDICATION: Right upper quadrant pain with nausea vomiting and diarrhea for several weeks. Elevated liver enzymes. Prior hysterectomy. TECHNIQUE: CT of the abdomen and pelvis without IV contrast. Coronal and sagittal reconstructions were obtained.  Radiation dose reduction techniques included automated exposure control or exposure modulation based on body size. Count of known CT and cardiac nuc med studies performed in previous 12 months: 0. COMPARISON: None available. FINDINGS: Abdomen: Minimal subsegmental atelectasis right lower lobe. Small calcified granuloma right lower lobe. Small hiatal hernia. Distended gallbladder with gallbladder wall thickening and extensive pericholecystic inflammatory changes highly concerning for acute cholecystitis. No gas in the gallbladder wall. Liver unremarkable. 4.2 cm low-attenuation lesion spleen may represent cyst or hemangioma. Pancreas, kidneys and adrenal glands appear  normal. Moderate contrast distention of the stomach. Small bowel unremarkable. Sigmoid diverticulosis without diverticulitis. Pelvis: Bladder decompressed. Uterus surgically absent. Osseous structures unremarkable.     Impression: Markedly abnormal gallbladder with gallbladder wall thickening, pericholecystic fluid and extensive pericholecystic inflammatory changes. Findings all suggest acute cholecystitis (calculous versus acalculous). No evidence of emphysematous cholecystitis and no biliary obstruction. 4.2 cm low-attenuation lesion within the spleen may represent cyst or hemangioma. Small hiatal hernia. Findings called to the medical surgical floor and discussed with the patient's nurse Tiny who states will notify Dr. Fan that this study has been read. Signer Name: HARIS Villafuerte MD  Signed: 2/3/2021 3:33 PM  Workstation Name: [id number]  [facility]    Xr Chest 2 View    Result Date: 2/3/2021  Narrative: CR Chest 2 Vws INDICATION:  66-year-old female with shortness of air for several weeks. COMPARISON:  Chest 8/9/2013 FINDINGS: PA and lateral views of the chest.  Heart and mediastinal contours are normal. The lungs are clear. No pneumothorax or pleural effusion.      Impression: No acute cardiopulmonary findings. Signer Name: Sonu Bautista MD  Signed: 2/3/2021 3:21 PM  Workstation Name: [id number]  [facility]    Us Gallbladder    Result Date: 2/3/2021  Narrative: ULTRASOUND ABDOMEN, LIMITED, 02/03/2021  HISTORY: 66-year-old female admitted to the hospital today with 1 week history of worsening right upper quadrant abdomen pain with nausea and vomiting. Elevated liver enzymes. Diabetes.  TECHNIQUE: Grayscale ultrasound imaging of the right upper abdomen.  COMPARISON: *  CT abdomen/pelvis, 02/03/2021.  FINDINGS: The gallbladder is markedly abnormal. There is severe diffuse gallbladder wall thickening, and multifocal fluid-filled spaces are present  throughout the gallbladder wall. The gallbladder is filled with dense, heterogeneous echogenic biliary sludge, there may be small stone material suspended within this fluid. The appearance is highly concerning for gangrenous cholecystitis. An underlying gallbladder mass would be difficult to exclude on this study.  There is no intrahepatic or extrahepatic bile duct dilatation (CBD 4 mm).  Liver is within normal limits. Pancreas is obscured by overlying bowel gas. Right kidney is negative with no hydronephrosis.      Impression: 1.  Markedly abnormal gallbladder showing findings highly concerning for acute gangrenous cholecystitis. Surgical consultation is indicated. Underlying gallbladder mass would be difficult to exclude on this study but is less likely. 2.  No bile duct dilatation. 3.  I have message the ordering physician with results at the time of this dictation.  This report was finalized on 2/3/2021 3:40 PM by Dr. Dillon Whitney MD.        Labs:  Results from last 7 days   Lab Units 02/10/21  0409 02/09/21  0742 02/08/21 0413 02/07/21 0400 02/06/21 0444 02/05/21 0337   WBC 10*3/mm3 9.68 9.83 11.80* 13.76* 18.29* 28.06*   HEMOGLOBIN g/dL 8.4* 7.9* 8.0* 8.1* 8.4* 10.0*   HEMATOCRIT % 27.1* 25.3* 25.3* 24.8* 26.4* 31.1*   PLATELETS 10*3/mm3 484* 415 465* 439 478* 512*     Results from last 7 days   Lab Units 02/10/21  0409 02/09/21  0742 02/08/21 0413 02/07/21  0400 02/06/21 0444 02/05/21 0337 02/04/21 2013   SODIUM mmol/L 135* 136 137 135* 135* 134* 133*   POTASSIUM mmol/L 3.7 3.6 3.8 3.9 3.9 4.8 4.0   CHLORIDE mmol/L 95* 95* 96* 95* 95* 94* 92*   CO2 mmol/L 30.7* 31.4* 32.2* 31.7* 30.4* 25.4 23.0   BUN mg/dL 11 12 12 19 30* 30* 24*   CREATININE mg/dL 1.01* 1.03* 1.01* 1.03* 1.31* 1.83* 1.45*   CALCIUM mg/dL 8.9 9.0 9.0 9.0 8.8 9.0 9.3   BILIRUBIN mg/dL 0.4 0.4 0.5 0.5 0.3 0.5  --    ALK PHOS U/L 468* 502* 520* 658* 288* 375*  --    ALT (SGPT) U/L 52* 61* 79* 125* 45* 65*  --    AST (SGOT) U/L 32  37* 60* 153* 40* 78*  --    GLUCOSE mg/dL 112* 145* 142* 139* 142* 193* 183*           Lab Results (last 24 hours)     Procedure Component Value Units Date/Time    POC Glucose Once [[id number]]  (Abnormal) Collected: 02/11/21 0734    Specimen: Blood Updated: 02/11/21 0750     Glucose 147 mg/dL     POC Glucose Once [[id number]]  (Abnormal) Collected: 02/10/21 2138    Specimen: Blood Updated: 02/10/21 2144     Glucose 187 mg/dL     POC Glucose Once [[id number]]  (Abnormal) Collected: 02/10/21 1700    Specimen: Blood Updated: 02/10/21 1710     Glucose 165 mg/dL     POC Glucose Once [[id number]]  (Abnormal) Collected: 02/10/21 1131    Specimen: Blood Updated: 02/10/21 1148     Glucose 190 mg/dL                                   Invalid input(s): LDLCALC          Glucose   Date/Time Value Ref Range Status   02/11/2021 0734 147 (H) 70 - 130 mg/dL Final   02/10/2021 2138 187 (H) 70 - 130 mg/dL Final   02/10/2021 1700 165 (H) 70 - 130 mg/dL Final   02/10/2021 1131 190 (H) 70 - 130 mg/dL Final   02/10/2021 0724 137 (H) 70 - 130 mg/dL Final   02/09/2021 2020 219 (H) 70 - 130 mg/dL Final   02/09/2021 1629 117 70 - 130 mg/dL Final   02/09/2021 1145 231 (H) 70 - 130 mg/dL Final                         Radiology:  Imaging Results (Last 24 Hours)     ** No results found for the last 24 hours. **          PROCEDURES  Procedure(s):  CHOLECYSTECTOMY LAPAROSCOPIC converted to open cholecystectomy        Allergies:  has No Known Allergies.      Discharge Medications:     Your medication list      START taking these medications      Instructions Last Dose Given Next Dose Due   amoxicillin-clavulanate 875-125 MG per tablet  Commonly known as: AUGMENTIN      Take 1 tablet by mouth Every 12 (Twelve) Hours for 12 doses. Indications: Infection Within the Abdomen       HYDROcodone-acetaminophen 5-325 MG per tablet  Commonly known as: NORCO      Take 1 tablet by mouth Every 6 (Six) Hours As Needed for Moderate Pain  for up to 3 days.        oxyCODONE-acetaminophen 5-325 MG per tablet  Commonly known as: Percocet      Take 1 tablet by mouth Every 4 (Four) Hours As Needed for Severe Pain  for up to 5 days.       saccharomyces boulardii 250 MG capsule  Commonly known as: Florastor      Take 1 capsule by mouth 2 (Two) Times a Day.          CHANGE how you take these medications      Instructions Last Dose Given Next Dose Due   atenolol-chlorthalidone 50-25 MG per tablet  Commonly known as: TENORETIC  What changed: how much to take      Take 0.5 tablets by mouth Daily.       Tresiba FlexTouch 200 UNIT/ML solution pen-injector pen injection  Generic drug: Insulin Degludec  What changed: See the new instructions.      Inject 10 Units under the skin into the appropriate area as directed Daily.          CONTINUE taking these medications      Instructions Last Dose Given Next Dose Due   allopurinol 100 MG tablet  Commonly known as: ZYLOPRIM      Take 100 mg by mouth 2 (Two) Times a Day.       atorvastatin 20 MG tablet  Commonly known as: LIPITOR      Take  by mouth Daily.       clobetasol 0.05 % cream  Commonly known as: TEMOVATE      APPLY THIN LAYER TO SCALP AND RIGHT FOREARM TWICE DAILY AS NEEDED       glipizide 10 MG 24 hr tablet  Commonly known as: GLUCOTROL XL      Take 10 mg by mouth Daily.       lisinopril 2.5 MG tablet  Commonly known as: PRINIVIL,ZESTRIL      Take 2.5 mg by mouth Daily.       omeprazole 20 MG capsule  Commonly known as: priLOSEC      Take 20 mg by mouth Daily.       predniSONE 1 MG tablet  Commonly known as: DELTASONE      Take  by mouth Daily.       pregabalin 75 MG capsule  Commonly known as: LYRICA      Take 75 mg by mouth every night at bedtime.             Where to Get Your Medications      These medications were sent to [facility] [place] [address] [place] [address]    Hours: 7:00AM TO 5:00PM MON TO FRI Phone: [phone]   · HYDROcodone-acetaminophen 5-325 MG per tablet  · saccharomyces boulardii 250 MG  capsule     These medications were sent to [id number] - [place], [place] - [address] - [phone]  - [phone] FX  [address] [place] [address]    Phone: [phone]   · amoxicillin-clavulanate 875-125 MG per tablet  · oxyCODONE-acetaminophen 5-325 MG per tablet     Information about where to get these medications is not yet available    Ask your nurse or doctor about these medications  · atenolol-chlorthalidone 50-25 MG per tablet  · Tresiba FlexTouch 200 UNIT/ML solution pen-injector pen injection         Home medications and discharge medications reconciled with patient      Condition on Discharge: Stable    Discharge Disposition  Home    Visiting Nurse:    No     Home PT/OT:  No     Home Safety Evaluation:  No     DME  None    Discharge Diet:      Dietary Orders (From admission, onward)     Start     Ordered    02/09/21 0649  Diet Regular; Consistent Carbohydrate, Cardiac  Diet Effective Now     Question Answer Comment   Diet Texture / Consistency Regular    Common Modifiers Consistent Carbohydrate    Common Modifiers Cardiac        02/09/21 0648                Activity at Discharge:  As tolerated      Follow-up Appointments:  Additional Instructions for the Follow-ups that You Need to Schedule     Call MD for problems / concerns.   As directed      Discharge Follow-up with PCP   As directed       Currently Documented PCP:    Hazel Fan MD    PCP Phone Number:    [phone]     Follow Up Details: thurdsday-set up appt if pt unable to come in will do telehealth         Discharge Follow-up with Specified Provider: dr. vines call her office to see when   As directed      To: dr. vines call her office to see when           Follow-up Information     Hazel Fan MD .    Specialty: Family Medicine  Why: thurdsday-set up appt if pt unable to come in will do telehealth  Contact information:  [address]  [place]  [address]  [phone]                   Test Results Pending at Discharge       Hazel Fan MD  02/11/21  07:56 EST    Much of this encounter note is an electronic transcription/translation of spoken language to printed text using Dragon Software

## 2021-02-11 NOTE — PROGRESS NOTES
Chief Complaint: POD # 7    Subjective   Pt reports 2 good BMs, tolerating diet, pain controlled on po pain medication   Objective     Vital signs in last 24 hours:  Temp:  [97.6 °F (36.4 °C)-98.7 °F (37.1 °C)] 97.6 °F (36.4 °C)  Heart Rate:  [81-94] 92  Resp:  [17-20] 19  BP: (102-148)/(63-76) 148/76    Intake/Output last 3 shifts:  I/O last 3 completed shifts:  In: 1080 [P.O.:1080]  Out: 2962 [Urine:2950; Drains:10; Stool:2]    Intake/Output this shift:  No intake/output data recorded.    Physical Exam:  Respiratory: CTA, good inspiratory effort  CV: RRR  Abd: + BS, soft, ND, usual post-op tenderness, no rebound, no guarding, incision C/D/I   Results from last 7 days   Lab Units 02/10/21  0409   WBC 10*3/mm3 9.68   HEMOGLOBIN g/dL 8.4*   HEMATOCRIT % 27.1*   PLATELETS 10*3/mm3 484*     Results from last 7 days   Lab Units 02/10/21  0409   SODIUM mmol/L 135*   POTASSIUM mmol/L 3.7   CHLORIDE mmol/L 95*   CO2 mmol/L 30.7*   BUN mg/dL 11   CREATININE mg/dL 1.01*   CALCIUM mg/dL 8.9   BILIRUBIN mg/dL 0.4   ALK PHOS U/L 468*   ALT (SGPT) U/L 52*   AST (SGOT) U/L 32   GLUCOSE mg/dL 112*         Assessment/Plan   S/P open cholecystectomy   Complete antibiotics   F/u in 2 weeks     Andie Robison MD  General, Minimally Invasive and Endoscopic Surgery  [facility]    [address]    [address]  [place], [place] [address] [place] [address]    P: [phone]  F: [phone]    Cc:  Hazel Fan MD

## 2021-02-11 NOTE — PROGRESS NOTES
"Daily Progress Note:      Chief complaint: Follow-up of cholecystitis, hypertension, chronic kidney disease, diarrhea    Subjective: Postop day #7 open cholecystectomy still complains of right lower quadrant abdominal pain.  2 BMs yesterday no other complaints      LOS: 8 days     Vital Signs  Temp:  [97.6 °F (36.4 °C)-98.7 °F (37.1 °C)] 97.6 °F (36.4 °C)  Heart Rate:  [81-94] 92  Resp:  [17-20] 19  BP: (102-148)/(63-76) 148/76  Oxygen Therapy  SpO2: 98 %  Pulse Oximetry Type: Intermittent  Device (Oxygen Therapy): room air  Flow (L/min): 2  ETCO2 (mmHg): 35 mmHg}  Body mass index is 34.84 kg/m².  Flowsheet Rows      First Filed Value   Admission Height  152.4 cm (60\") Documented at 02/03/2021 1330   Admission Weight  80.7 kg (178 lb) Documented at 02/03/2021 1330                   Documented weights    02/03/21 1330 02/10/21 0536 02/11/21 0521   Weight: 80.7 kg (178 lb) 82.8 kg (182 lb 9.6 oz) 80.9 kg (178 lb 6.4 oz)           Patient Vitals for the past 24 hrs:   BP Temp Temp src Pulse Resp SpO2 Weight   02/11/21 0521 148/76 97.6 °F (36.4 °C) Oral 92 19 98 % 80.9 kg (178 lb 6.4 oz)   02/10/21 1942 102/64 98.5 °F (36.9 °C) Oral 90 20 98 % --   02/10/21 1554 107/68 98.7 °F (37.1 °C) Oral 81 17 96 % --   02/10/21 1130 114/63 98.4 °F (36.9 °C) Oral 94 17 95 % --       80.9 kg (178 lb 6.4 oz)      Intake/Output Summary (Last 24 hours) at 2/11/2021 0747  Last data filed at 2/11/2021 0521  Gross per 24 hour   Intake 1080 ml   Output 2062 ml   Net -982 ml       Review of Systems   Constitutional: Negative for activity change, appetite change and fatigue.   HENT: Negative for congestion.    Respiratory: Negative for cough, chest tightness, shortness of breath and wheezing.    Cardiovascular: Negative for chest pain.   Gastrointestinal: Positive for abdominal pain (Right upper quadrant incisional). Negative for abdominal distention, diarrhea, nausea and vomiting.   Endocrine: Negative for polyphagia and polyuria. "   Genitourinary: Negative for frequency.   Skin: Negative for rash.   Neurological: Negative for light-headedness.   Hematological: Does not bruise/bleed easily.   Psychiatric/Behavioral: Negative for agitation and behavioral problems.       Physical Exam  Vitals signs and nursing note reviewed.   Constitutional:       Appearance: She is well-developed. She is obese.   HENT:      Head: Normocephalic.   Eyes:      Conjunctiva/sclera: Conjunctivae normal.   Neck:      Musculoskeletal: Normal range of motion.      Thyroid: No thyromegaly.      Vascular: No JVD.   Cardiovascular:      Rate and Rhythm: Normal rate and regular rhythm.      Heart sounds: Normal heart sounds. No murmur.   Pulmonary:      Effort: Pulmonary effort is normal. No respiratory distress.      Breath sounds: Normal breath sounds. No wheezing or rales.   Abdominal:      General: Bowel sounds are normal. There is no distension.      Palpations: Abdomen is soft.      Tenderness: There is abdominal tenderness (Right lower quadrant). There is no guarding.   Skin:     General: Skin is warm and dry.      Findings: No rash.   Neurological:      Mental Status: She is alert.         Medication Review:   I have reviewed the patient's current medication list  Scheduled Meds:allopurinol, 100 mg, Oral, BID  amoxicillin-clavulanate, 1 tablet, Oral, Q12H  atorvastatin, 20 mg, Oral, Nightly  bisacodyl, 10 mg, Rectal, Daily  glipizide, 2.5 mg, Oral, BID AC  insulin aspart, 0-9 Units, Subcutaneous, 4x Daily AC & at Bedtime  pantoprazole, 40 mg, Oral, QAM  predniSONE, 1 mg, Oral, Daily  pregabalin, 75 mg, Oral, Q12H  sodium chloride, 10 mL, Intravenous, Q12H  sodium chloride, 10 mL, Intravenous, Q12H      Continuous Infusions:   PRN Meds:.•  acetaminophen **OR** acetaminophen  •  dextrose  •  dextrose  •  glucagon (human recombinant)  •  HYDROcodone-acetaminophen  •  HYDROcodone-acetaminophen  •  ondansetron **OR** ondansetron  •  sodium chloride  •  sodium  chloride  •  sodium chloride      Labs:  Results from last 7 days   Lab Units 02/10/21  0409 02/09/21  0742 02/08/21  0413 02/07/21  0400 02/06/21  0444 02/05/21  0337   WBC 10*3/mm3 9.68 9.83 11.80* 13.76* 18.29* 28.06*   HEMOGLOBIN g/dL 8.4* 7.9* 8.0* 8.1* 8.4* 10.0*   HEMATOCRIT % 27.1* 25.3* 25.3* 24.8* 26.4* 31.1*   PLATELETS 10*3/mm3 484* 415 465* 439 478* 512*     Results from last 7 days   Lab Units 02/10/21  0409 02/09/21  0742 02/08/21  0413 02/07/21  0400 02/06/21  0444 02/05/21  0337 02/04/21  2013   SODIUM mmol/L 135* 136 137 135* 135* 134* 133*   POTASSIUM mmol/L 3.7 3.6 3.8 3.9 3.9 4.8 4.0   CHLORIDE mmol/L 95* 95* 96* 95* 95* 94* 92*   CO2 mmol/L 30.7* 31.4* 32.2* 31.7* 30.4* 25.4 23.0   BUN mg/dL 11 12 12 19 30* 30* 24*   CREATININE mg/dL 1.01* 1.03* 1.01* 1.03* 1.31* 1.83* 1.45*   CALCIUM mg/dL 8.9 9.0 9.0 9.0 8.8 9.0 9.3   BILIRUBIN mg/dL 0.4 0.4 0.5 0.5 0.3 0.5  --    ALK PHOS U/L 468* 502* 520* 658* 288* 375*  --    ALT (SGPT) U/L 52* 61* 79* 125* 45* 65*  --    AST (SGOT) U/L 32 37* 60* 153* 40* 78*  --    GLUCOSE mg/dL 112* 145* 142* 139* 142* 193* 183*           Results from last 7 days   Lab Units 02/10/21  0409 02/09/21  0742 02/08/21  0413   AST (SGOT) U/L 32 37* 60*   ALT (SGPT) U/L 52* 61* 79*   PLATELETS 10*3/mm3 484* 415 465*         Lab Results (last 24 hours)     Procedure Component Value Units Date/Time    POC Glucose Once [[id number]]  (Abnormal) Collected: 02/10/21 2138    Specimen: Blood Updated: 02/10/21 2144     Glucose 187 mg/dL     POC Glucose Once [[id number]]  (Abnormal) Collected: 02/10/21 1700    Specimen: Blood Updated: 02/10/21 1710     Glucose 165 mg/dL     POC Glucose Once [[id number]]  (Abnormal) Collected: 02/10/21 1131    Specimen: Blood Updated: 02/10/21 1148     Glucose 190 mg/dL                                         Glucose   Date/Time Value Ref Range Status   02/10/2021 2138 187 (H) 70 - 130 mg/dL Final   02/10/2021 1700 165 (H) 70 - 130 mg/dL Final   02/10/2021  1131 190 (H) 70 - 130 mg/dL Final   02/10/2021 0724 137 (H) 70 - 130 mg/dL Final   02/09/2021 2020 219 (H) 70 - 130 mg/dL Final   02/09/2021 1629 117 70 - 130 mg/dL Final   02/09/2021 1145 231 (H) 70 - 130 mg/dL Final   02/09/2021 0733 160 (H) 70 - 130 mg/dL Final                         Radiology:  Imaging Results (Last 24 Hours)     ** No results found for the last 24 hours. **          Cardiology:  ECG/EMG Results (last 24 hours)     ** No results found for the last 24 hours. **          I have reviewed recent labs results and consult notes.  Parts of this note may have been copied and pasted but patient was examined and interviewed by me today    Assessment and Plan:  1.  Acute gangrenous cholecystitis postop day #5  status post open cholecystectomy.  Leukocytosis is resolved.  Liver enzymes are decreasing.  Home today    2.    Kidney disease stage III at baseline    3.  Adult-onset diabetes mellitus insulin-dependent controlled continue with Accu-Chek sliding cell insulin.  Oral hypoglycemic started yesterday remains off long-acting insulin     4.  Hypertension normotensive off medications        5.    Anemia stable postop    6.  History of pulmonary sarcoidosis in remission continue with low-dose steroids      7.  DVT prophylaxis SCD    Much of this encounter note is an electronic transcription/translation of spoken language to printed text using Dragon Software

## 2021-02-11 NOTE — PLAN OF CARE
Goal Outcome Evaluation:     Progress: improving  Outcome Summary: VSS, pain controlled with prn pain meds, safety measuires in place, no complaints at this time

## 2021-02-12 ENCOUNTER — READMISSION MANAGEMENT (OUTPATIENT)
Dept: CALL CENTER | Facility: HOSPITAL | Age: 66
End: 2021-02-12

## 2021-02-12 NOTE — NURSING NOTE
Case Management Discharge Note      Final Note: Discharged home.    Provided Post Acute Provider List?: N/A  N/A Provider List Comment: Not at this time    Selected Continued Care - Discharged on 2/11/2021 Admission date: 2/3/2021 - Discharge disposition: Home or Self Care    Destination    No services have been selected for the patient.              Durable Medical Equipment    No services have been selected for the patient.              Dialysis/Infusion    No services have been selected for the patient.              Home Medical Care    No services have been selected for the patient.              Therapy    No services have been selected for the patient.              Community Resources    No services have been selected for the patient.                       Final Discharge Disposition Code: 01 - home or self-care

## 2021-02-12 NOTE — OUTREACH NOTE
Prep Survey      Responses   [facility] patient discharged from?  [facility]   Is LACE score < 7 ?  No   Emergency Room discharge w/ pulse ox?  No   Eligibility  Readm Mgmt   Discharge diagnosis  Acute gangrenous cholecystitis   Does the patient have one of the following disease processes/diagnoses(primary or secondary)?  General Surgery   Does the patient have Home health ordered?  No   Is there a DME ordered?  No   Prep survey completed?  Yes          Aileen Yo RN

## 2021-02-12 NOTE — PAYOR COMM NOTE
"Lisbeth Blanca (66 y.o. Female)     ATTN: NURSE REVIEWER  RE: DISCHARGE NOTIFICATION  REF#[id number]  PLS  REPLY TO GUILLERMO GREEN [phone] FAX# [phone]      Date of Birth Social Security Number Address Home Phone MRN    1955  [address] [place] [address] [phone] [id number]    Zoroastrianism Marital Status          None        Admission Date Admission Type Admitting Provider Attending Provider Department, Room/Bed    2/3/21 Urgent Hazel Fan MD  [facility] MED SURG, 1419/1    Discharge Date Discharge Disposition Discharge Destination        2/11/2021 Home or Self Care              Attending Provider: (none)   Allergies: No Known Allergies    Isolation: None   Infection: None   Code Status: Prior    Ht: 152.4 cm (60\")   Wt: 80.9 kg (178 lb 6.4 oz)    Admission Cmt: None   Principal Problem: None                Active Insurance as of 2/3/2021     Primary Coverage     Payor Plan Insurance Group Employer/Plan Group    [facility] MEDICARE REPLACEMENT [employer] MEDICARE REPLACEMENT      Payor Plan Address Payor Plan Phone Number Payor Plan Fax Number Effective Dates    [address] [address] [phone]  7/8/2020 - None Entered    [facility] [id number]       Subscriber Name Subscriber Birth Date Member ID       LISBETH BLANCA 1955 [id number]                 Emergency Contacts      (Rel.) Home Phone Work Phone Mobile Phone    Nate Ramos (Son) [phone] -- --    Jose Ybarraelle (Daughter) [phone] -- --               Discharge Summary      Hazel Fan MD at 02/11/21 Lex Blanca  1955  [id number]        Discharge Summary    Date of Admission: 2/3/2021  Date of Discharge:  2/11/2021    Primary Discharge Diagnoses:     Acute gangrenous cholecystitis  Acute kidney injury chronic kidney disease stage III resolved  Gastroenteritis  Mild postoperative ileus    Secondary Discharge Diagnoses: "     Hypertension  Hyperlipidemia  Adult-onset diabetes mellitus insulin-dependent  Sarcoidosis  Hyperuricemia    PCP  Patient Care Team:  Hazel Fan MD as PCP - General  Hazel Fan MD as PCP - Family Medicine    Consults:   Consults     Date and Time Order Name Status Description    2/3/2021 1639 Inpatient General Surgery Consult              History of Present Illness:  66-year-old white female with history of diabetes hypertension hyperlipidemia chronic kidney disease who became ill a week ago with nausea 1 episode of vomiting and fatigue.  She had several episodes of diarrhea was seen in urgent care diagnosed with a viral infection was Covid negative.  Patient continued feeling extremely fatigued and worn out  throughout the week with intermittent episodes of diarrhea was seen in my office on 2/1/2021.  Exam was unremarkable but labs done in the office showed worsening renal functions with creatinine 1.6 baseline being between 1.2-1.3 and BUN elevated to 50 elevated liver enzymes are marked elevated alk phos..  She denies any abdominal pain vomiting.  Initial plan for with the patient to get some IV fluids and be discharged home but her white count came back 17,000 and CT of the abdomen and ultrasound gallbladder showed possible gangrenous cholecystitis.  Patient is being admitted for further evaluation and treatment.  She has no abdominal symptoms consistent with chronic cholecystits or cholelithiasis.    Hospital Course     Patient admitted to hospital start IV fluids and pancultured.  Surgical consultation was obtained she was taken the operating room on 2/4/2021 for laparoscopic converted to open cholecystectomy.  Postoperative course was uneventful except for mild ileus which resolved.  Her diet was advanced and she was tolerating a diet and having bowel was at time of discharge.  She was ambulating without any problems.  Her blood pressure remained stable off of medications.   Renal function remained stable.  Leukocytosis resolved.  Liver enzymes are improving.  Patient did have some postoperative anemia with hemoglobin between 8 and 8.5 which stayed stable.    She is discharged home satisfied condition on p.o. Augmentin for 5 days as well as a probiotic.  Her insulin was decreased to 10 units daily and her diuretic was decreased to 1/2 tablet daily.  She will have a office visit with me next week.  And with Dr. Robison as instructed.      Operations and Procedures Performed:  Procedure(s):  CHOLECYSTECTOMY LAPAROSCOPIC converted to open cholecystectomy     Ct Abdomen Pelvis Without Contrast    Result Date: 2/3/2021  Narrative: CT Abdomen Pelvis WO INDICATION: Right upper quadrant pain with nausea vomiting and diarrhea for several weeks. Elevated liver enzymes. Prior hysterectomy. TECHNIQUE: CT of the abdomen and pelvis without IV contrast. Coronal and sagittal reconstructions were obtained.  Radiation dose reduction techniques included automated exposure control or exposure modulation based on body size. Count of known CT and cardiac nuc med studies performed in previous 12 months: 0. COMPARISON: None available. FINDINGS: Abdomen: Minimal subsegmental atelectasis right lower lobe. Small calcified granuloma right lower lobe. Small hiatal hernia. Distended gallbladder with gallbladder wall thickening and extensive pericholecystic inflammatory changes highly concerning for acute cholecystitis. No gas in the gallbladder wall. Liver unremarkable. 4.2 cm low-attenuation lesion spleen may represent cyst or hemangioma. Pancreas, kidneys and adrenal glands appear normal. Moderate contrast distention of the stomach. Small bowel unremarkable. Sigmoid diverticulosis without diverticulitis. Pelvis: Bladder decompressed. Uterus surgically absent. Osseous structures unremarkable.     Impression: Markedly abnormal gallbladder with gallbladder wall thickening, pericholecystic fluid and extensive  pericholecystic inflammatory changes. Findings all suggest acute cholecystitis (calculous versus acalculous). No evidence of emphysematous cholecystitis and no biliary obstruction. 4.2 cm low-attenuation lesion within the spleen may represent cyst or hemangioma. Small hiatal hernia. Findings called to the medical surgical floor and discussed with the patient's nurse Tiny who states will notify Dr. Fan that this study has been read. Signer Name: HARIS Villafuerte MD  Signed: 2/3/2021 3:33 PM  Workstation Name: [id number]  [facility]    Xr Chest 2 View    Result Date: 2/3/2021  Narrative: CR Chest 2 Vws INDICATION:  66-year-old female with shortness of air for several weeks. COMPARISON:  Chest 8/9/2013 FINDINGS: PA and lateral views of the chest.  Heart and mediastinal contours are normal. The lungs are clear. No pneumothorax or pleural effusion.      Impression: No acute cardiopulmonary findings. Signer Name: Sonu Bautista MD  Signed: 2/3/2021 3:21 PM  Workstation Name: [id number]  [facility]    Us Gallbladder    Result Date: 2/3/2021  Narrative: ULTRASOUND ABDOMEN, LIMITED, 02/03/2021  HISTORY: 66-year-old female admitted to the hospital today with 1 week history of worsening right upper quadrant abdomen pain with nausea and vomiting. Elevated liver enzymes. Diabetes.  TECHNIQUE: Grayscale ultrasound imaging of the right upper abdomen.  COMPARISON: *  CT abdomen/pelvis, 02/03/2021.  FINDINGS: The gallbladder is markedly abnormal. There is severe diffuse gallbladder wall thickening, and multifocal fluid-filled spaces are present throughout the gallbladder wall. The gallbladder is filled with dense, heterogeneous echogenic biliary sludge, there may be small stone material suspended within this fluid. The appearance is highly concerning for gangrenous cholecystitis. An underlying gallbladder mass would be difficult to exclude on this study.  There is no  intrahepatic or extrahepatic bile duct dilatation (CBD 4 mm).  Liver is within normal limits. Pancreas is obscured by overlying bowel gas. Right kidney is negative with no hydronephrosis.      Impression: 1.  Markedly abnormal gallbladder showing findings highly concerning for acute gangrenous cholecystitis. Surgical consultation is indicated. Underlying gallbladder mass would be difficult to exclude on this study but is less likely. 2.  No bile duct dilatation. 3.  I have message the ordering physician with results at the time of this dictation.  This report was finalized on 2/3/2021 3:40 PM by Dr. Dillon Whitney MD.        Labs:  Results from last 7 days   Lab Units 02/10/21  0409 02/09/21  0742 02/08/21  0413 02/07/21 0400 02/06/21 0444 02/05/21 0337   WBC 10*3/mm3 9.68 9.83 11.80* 13.76* 18.29* 28.06*   HEMOGLOBIN g/dL 8.4* 7.9* 8.0* 8.1* 8.4* 10.0*   HEMATOCRIT % 27.1* 25.3* 25.3* 24.8* 26.4* 31.1*   PLATELETS 10*3/mm3 484* 415 465* 439 478* 512*     Results from last 7 days   Lab Units 02/10/21  0409 02/09/21  0742 02/08/21 0413 02/07/21 0400 02/06/21 0444 02/05/21 0337 02/04/21 2013   SODIUM mmol/L 135* 136 137 135* 135* 134* 133*   POTASSIUM mmol/L 3.7 3.6 3.8 3.9 3.9 4.8 4.0   CHLORIDE mmol/L 95* 95* 96* 95* 95* 94* 92*   CO2 mmol/L 30.7* 31.4* 32.2* 31.7* 30.4* 25.4 23.0   BUN mg/dL 11 12 12 19 30* 30* 24*   CREATININE mg/dL 1.01* 1.03* 1.01* 1.03* 1.31* 1.83* 1.45*   CALCIUM mg/dL 8.9 9.0 9.0 9.0 8.8 9.0 9.3   BILIRUBIN mg/dL 0.4 0.4 0.5 0.5 0.3 0.5  --    ALK PHOS U/L 468* 502* 520* 658* 288* 375*  --    ALT (SGPT) U/L 52* 61* 79* 125* 45* 65*  --    AST (SGOT) U/L 32 37* 60* 153* 40* 78*  --    GLUCOSE mg/dL 112* 145* 142* 139* 142* 193* 183*           Lab Results (last 24 hours)     Procedure Component Value Units Date/Time    POC Glucose Once [[id number]]  (Abnormal) Collected: 02/11/21 0734    Specimen: Blood Updated: 02/11/21 0750     Glucose 147 mg/dL     POC Glucose Once [[id number]]   (Abnormal) Collected: 02/10/21 2138    Specimen: Blood Updated: 02/10/21 2144     Glucose 187 mg/dL     POC Glucose Once [[id number]]  (Abnormal) Collected: 02/10/21 1700    Specimen: Blood Updated: 02/10/21 1710     Glucose 165 mg/dL     POC Glucose Once [[id number]]  (Abnormal) Collected: 02/10/21 1131    Specimen: Blood Updated: 02/10/21 1148     Glucose 190 mg/dL                                   Invalid input(s): LDLCALC          Glucose   Date/Time Value Ref Range Status   02/11/2021 0734 147 (H) 70 - 130 mg/dL Final   02/10/2021 2138 187 (H) 70 - 130 mg/dL Final   02/10/2021 1700 165 (H) 70 - 130 mg/dL Final   02/10/2021 1131 190 (H) 70 - 130 mg/dL Final   02/10/2021 0724 137 (H) 70 - 130 mg/dL Final   02/09/2021 2020 219 (H) 70 - 130 mg/dL Final   02/09/2021 1629 117 70 - 130 mg/dL Final   02/09/2021 1145 231 (H) 70 - 130 mg/dL Final                         Radiology:  Imaging Results (Last 24 Hours)     ** No results found for the last 24 hours. **          PROCEDURES  Procedure(s):  CHOLECYSTECTOMY LAPAROSCOPIC converted to open cholecystectomy        Allergies:  has No Known Allergies.      Discharge Medications:     Your medication list      START taking these medications      Instructions Last Dose Given Next Dose Due   amoxicillin-clavulanate 875-125 MG per tablet  Commonly known as: AUGMENTIN      Take 1 tablet by mouth Every 12 (Twelve) Hours for 12 doses. Indications: Infection Within the Abdomen       HYDROcodone-acetaminophen 5-325 MG per tablet  Commonly known as: NORCO      Take 1 tablet by mouth Every 6 (Six) Hours As Needed for Moderate Pain  for up to 3 days.       oxyCODONE-acetaminophen 5-325 MG per tablet  Commonly known as: Percocet      Take 1 tablet by mouth Every 4 (Four) Hours As Needed for Severe Pain  for up to 5 days.       saccharomyces boulardii 250 MG capsule  Commonly known as: Florastor      Take 1 capsule by mouth 2 (Two) Times a Day.          CHANGE how you take these  medications      Instructions Last Dose Given Next Dose Due   atenolol-chlorthalidone 50-25 MG per tablet  Commonly known as: TENORETIC  What changed: how much to take      Take 0.5 tablets by mouth Daily.       Tresiba FlexTouch 200 UNIT/ML solution pen-injector pen injection  Generic drug: Insulin Degludec  What changed: See the new instructions.      Inject 10 Units under the skin into the appropriate area as directed Daily.          CONTINUE taking these medications      Instructions Last Dose Given Next Dose Due   allopurinol 100 MG tablet  Commonly known as: ZYLOPRIM      Take 100 mg by mouth 2 (Two) Times a Day.       atorvastatin 20 MG tablet  Commonly known as: LIPITOR      Take  by mouth Daily.       clobetasol 0.05 % cream  Commonly known as: TEMOVATE      APPLY THIN LAYER TO SCALP AND RIGHT FOREARM TWICE DAILY AS NEEDED       glipizide 10 MG 24 hr tablet  Commonly known as: GLUCOTROL XL      Take 10 mg by mouth Daily.       lisinopril 2.5 MG tablet  Commonly known as: PRINIVIL,ZESTRIL      Take 2.5 mg by mouth Daily.       omeprazole 20 MG capsule  Commonly known as: priLOSEC      Take 20 mg by mouth Daily.       predniSONE 1 MG tablet  Commonly known as: DELTASONE      Take  by mouth Daily.       pregabalin 75 MG capsule  Commonly known as: LYRICA      Take 75 mg by mouth every night at bedtime.             Where to Get Your Medications      These medications were sent to [facility] [address] [address]    Hours: 7:00AM TO 5:00PM MON TO FRI Phone: [phone]   · HYDROcodone-acetaminophen 5-325 MG per tablet  · saccharomyces boulardii 250 MG capsule     These medications were sent to [facility]/pharmacy #[id number] - [place], [place] - [address] - [phone]  - [phone]   [address] [facility] [place] [address]    Phone: [phone]   · amoxicillin-clavulanate 875-125 MG per tablet  · oxyCODONE-acetaminophen 5-325 MG per tablet     Information about where  to get these medications is not yet available    Ask your nurse or doctor about these medications  · atenolol-chlorthalidone 50-25 MG per tablet  · Tresiba FlexTouch 200 UNIT/ML solution pen-injector pen injection         Home medications and discharge medications reconciled with patient      Condition on Discharge: Stable    Discharge Disposition  Home    Visiting Nurse:    No     Home PT/OT:  No     Home Safety Evaluation:  No     DME  None    Discharge Diet:      Dietary Orders (From admission, onward)     Start     Ordered    02/09/21 0649  Diet Regular; Consistent Carbohydrate, Cardiac  Diet Effective Now     Question Answer Comment   Diet Texture / Consistency Regular    Common Modifiers Consistent Carbohydrate    Common Modifiers Cardiac        02/09/21 0648                Activity at Discharge:  As tolerated      Follow-up Appointments:  Additional Instructions for the Follow-ups that You Need to Schedule     Call MD for problems / concerns.   As directed      Discharge Follow-up with PCP   As directed       Currently Documented PCP:    Hazel Fan MD    PCP Phone Number:    [phone]     Follow Up Details: thurdsday-set up appt if pt unable to come in will do telehealth         Discharge Follow-up with Specified Provider: dr. vines call her office to see when   As directed      To: dr. vines call her office to see when           Follow-up Information     Hazel Fan MD .    Specialty: Family Medicine  Why: thurdsday-set up appt if pt unable to come in will do telehealth  Contact information:  [address]  [place] [address]  [phone]                   Test Results Pending at Discharge       Hazel Fan MD  02/11/21  07:56 EST    Much of this encounter note is an electronic transcription/translation of spoken language to printed text using Dragon Software              Electronically signed by Hazel Fan MD at 02/11/21 5330

## 2021-02-14 ENCOUNTER — READMISSION MANAGEMENT (OUTPATIENT)
Dept: CALL CENTER | Facility: HOSPITAL | Age: 66
End: 2021-02-14

## 2021-02-14 NOTE — OUTREACH NOTE
General Surgery Week 1 Survey      Responses   [facility] patient discharged from?  [facility]   Does the patient have one of the following disease processes/diagnoses(primary or secondary)?  General Surgery   Week 1 attempt successful?  Yes   Call start time  1255   Call end time  1257   Discharge diagnosis  Acute gangrenous cholecystitis   Meds reviewed with patient/caregiver?  Yes   Is the patient having any side effects they believe may be caused by any medication additions or changes?  No   Does the patient have all medications related to this admission filled (includes all antibiotics, pain medications, etc.)  Yes   Is the patient taking all medications as directed (includes completed medication regime)?  Yes   Does the patient have a follow up appointment scheduled with their surgeon?  Yes   Has the patient kept scheduled appointments due by today?  N/A   Psychosocial issues?  No   Did the patient receive a copy of their discharge instructions?  Yes   Nursing interventions  Reviewed instructions with patient   What is the patient's perception of their health status since discharge?  Improving   Nursing interventions  Nurse provided patient education   Is the patient /caregiver able to teach back basic post-op care?  Continue use of incentive spirometry at least 1 week post discharge, Practice 'cough and deep breath'   Is the patient/caregiver able to teach back signs and symptoms of incisional infection?  Increased redness, swelling or pain at the incisonal site, Increased drainage or bleeding   Is the patient/caregiver able to teach back steps to recovery at home?  Set small, achievable goals for return to baseline health, Rest and rebuild strength, gradually increase activity   If the patient is a current smoker, are they able to teach back resources for cessation?  Not a smoker   Is the patient/caregiver able to teach back the hierarchy of who to call/visit for symptoms/problems? PCP, Specialist, Home  health nurse, Urgent Care, ED, 911  Yes   Week 1 call completed?  Yes          Marium Curran RN

## 2021-02-17 ENCOUNTER — APPOINTMENT (OUTPATIENT)
Dept: VACCINE CLINIC | Facility: HOSPITAL | Age: 66
End: 2021-02-17

## 2021-02-22 ENCOUNTER — READMISSION MANAGEMENT (OUTPATIENT)
Dept: CALL CENTER | Facility: HOSPITAL | Age: 66
End: 2021-02-22

## 2021-02-22 NOTE — OUTREACH NOTE
General Surgery Week 2 Survey      Responses   [facility] patient discharged from?  [facility]   Does the patient have one of the following disease processes/diagnoses(primary or secondary)?  General Surgery   Week 2 attempt successful?  No   Unsuccessful attempts  Attempt 1          Benedict Swenson RN

## 2021-02-24 ENCOUNTER — OFFICE VISIT (OUTPATIENT)
Dept: SURGERY | Facility: CLINIC | Age: 66
End: 2021-02-24

## 2021-02-24 VITALS
WEIGHT: 176.6 LBS | BODY MASS INDEX: 34.67 KG/M2 | DIASTOLIC BLOOD PRESSURE: 62 MMHG | HEART RATE: 57 BPM | HEIGHT: 60 IN | OXYGEN SATURATION: 98 % | SYSTOLIC BLOOD PRESSURE: 112 MMHG

## 2021-02-24 DIAGNOSIS — Z09 FOLLOW UP: Primary | ICD-10-CM

## 2021-02-24 PROCEDURE — 99024 POSTOP FOLLOW-UP VISIT: CPT | Performed by: SURGERY

## 2021-02-24 NOTE — PROGRESS NOTES
"Chief complaint:    Chief Complaint   Patient presents with   • Post-op     open rafaela       History of Present Illness    This is Lisbeth Lucas 66 y.o. status post open cholecystectomy and is doing very well.  Patient denies fever, chills, nausea or vomiting.  Patient's pain is well-controlled.      The following portions of the patient's history were reviewed and updated as appropriate: allergies, current medications, past family history, past medical history, past social history, past surgical history and problem list.    Vitals:    02/24/21 0909   BP: 112/62   BP Location: Left arm   Patient Position: Sitting   Cuff Size: Adult   Pulse: 57   SpO2: 98%   Weight: 80.1 kg (176 lb 9.6 oz)   Height: 152.4 cm (60\")       Physical Exam  Gen.: Patient is alert and oriented ×3, no acute distress  HEENT: Normal cephalic, atraumatic, moist mucous membranes, anicteric  Incision is well-healed without evidence of infection, herniation or seroma.    Assessment/plan:    S/P open cholecystectomy  I have removed her staples.  I have instructed the patient not lift greater than 10 pounds for total of 6 weeks from the time of surgery.   I have instructed the patient follow-up as needed.    Andie Robison MD  General, Minimally Invasive and Endoscopic Surgery  [facility]    [address]    [address]  [place] [address]               [place] [address]    P: [phone]  F: [phone]    Cc:  Hazel Fan MD  "

## 2021-02-25 ENCOUNTER — READMISSION MANAGEMENT (OUTPATIENT)
Dept: CALL CENTER | Facility: HOSPITAL | Age: 66
End: 2021-02-25

## 2021-02-25 NOTE — OUTREACH NOTE
General Surgery Week 2 Survey      Responses   [facility] patient discharged from?  [facility]   Does the patient have one of the following disease processes/diagnoses(primary or secondary)?  General Surgery   Week 2 attempt successful?  Yes   Call start time  1329   Call end time  1330   Discharge diagnosis  Acute gangrenous cholecystitis   Meds reviewed with patient/caregiver?  Yes   Is the patient having any side effects they believe may be caused by any medication additions or changes?  No   Does the patient have all medications related to this admission filled (includes all antibiotics, pain medications, etc.)  Yes   Is the patient taking all medications as directed (includes completed medication regime)?  Yes   Does the patient have a follow up appointment scheduled with their surgeon?  Yes   Has the patient kept scheduled appointments due by today?  Yes   Comments  Had appt with surgeon yesterday 2/24/2021   Has home health visited the patient within 72 hours of discharge?  N/A   Psychosocial issues?  No   Did the patient receive a copy of their discharge instructions?  Yes   Nursing interventions  Reviewed instructions with patient   What is the patient's perception of their health status since discharge?  Improving   Nursing interventions  Nurse provided patient education   Is the patient /caregiver able to teach back basic post-op care?  Continue use of incentive spirometry at least 1 week post discharge, Practice 'cough and deep breath', Lifting as instructed by MD in discharge instructions   Is the patient/caregiver able to teach back signs and symptoms of incisional infection?  Increased redness, swelling or pain at the incisonal site, Increased drainage or bleeding   Is the patient/caregiver able to teach back steps to recovery at home?  Set small, achievable goals for return to baseline health, Rest and rebuild strength, gradually increase activity, Make a list of questions for surgeon's  appointment   If the patient is a current smoker, are they able to teach back resources for cessation?  Not a smoker   Is the patient/caregiver able to teach back the hierarchy of who to call/visit for symptoms/problems? PCP, Specialist, Home health nurse, Urgent Care, ED, 911  Yes   Week 2 call completed?  Yes          Geoffrey Rivera RN

## 2021-05-26 ENCOUNTER — TRANSCRIBE ORDERS (OUTPATIENT)
Dept: ADMINISTRATIVE | Facility: HOSPITAL | Age: 66
End: 2021-05-26

## 2021-05-26 DIAGNOSIS — Z78.0 MENOPAUSE: Primary | ICD-10-CM

## 2021-06-16 ENCOUNTER — TRANSCRIBE ORDERS (OUTPATIENT)
Dept: ADMINISTRATIVE | Facility: HOSPITAL | Age: 66
End: 2021-06-16

## 2021-06-16 DIAGNOSIS — Z12.31 ENCOUNTER FOR SCREENING MAMMOGRAM FOR MALIGNANT NEOPLASM OF BREAST: Primary | ICD-10-CM

## 2021-06-30 ENCOUNTER — HOSPITAL ENCOUNTER (OUTPATIENT)
Dept: MAMMOGRAPHY | Facility: HOSPITAL | Age: 66
Discharge: HOME | End: 2021-06-30
Admitting: FAMILY MEDICINE

## 2021-06-30 DIAGNOSIS — Z12.31 ENCOUNTER FOR SCREENING MAMMOGRAM FOR MALIGNANT NEOPLASM OF BREAST: ICD-10-CM

## 2021-06-30 PROCEDURE — 77063 BREAST TOMOSYNTHESIS BI: CPT

## 2021-06-30 PROCEDURE — 77067 SCR MAMMO BI INCL CAD: CPT

## 2021-07-19 ENCOUNTER — HOSPITAL ENCOUNTER (OUTPATIENT)
Dept: GENERAL RADIOLOGY | Facility: HOSPITAL | Age: 66
Discharge: HOME | End: 2021-07-19
Admitting: FAMILY MEDICINE

## 2021-07-19 ENCOUNTER — TRANSCRIBE ORDERS (OUTPATIENT)
Dept: ADMINISTRATIVE | Facility: HOSPITAL | Age: 66
End: 2021-07-19

## 2021-07-19 DIAGNOSIS — R07.9 CHEST PAIN, UNSPECIFIED TYPE: Primary | ICD-10-CM

## 2021-07-19 DIAGNOSIS — R07.9 CHEST PAIN, UNSPECIFIED TYPE: ICD-10-CM

## 2021-07-19 PROCEDURE — 71046 X-RAY EXAM CHEST 2 VIEWS: CPT

## 2021-10-11 ENCOUNTER — TRANSCRIBE ORDERS (OUTPATIENT)
Dept: ADMINISTRATIVE | Facility: HOSPITAL | Age: 66
End: 2021-10-11

## 2021-10-11 ENCOUNTER — LAB (OUTPATIENT)
Dept: LAB | Facility: HOSPITAL | Age: 66
End: 2021-10-11

## 2021-10-11 DIAGNOSIS — Z79.899 ENCOUNTER FOR LONG-TERM (CURRENT) USE OF OTHER MEDICATIONS: ICD-10-CM

## 2021-10-11 DIAGNOSIS — L40.0 PSORIASIS VULGARIS: ICD-10-CM

## 2021-10-11 DIAGNOSIS — L40.0 PSORIASIS VULGARIS: Primary | ICD-10-CM

## 2021-10-11 PROCEDURE — 36415 COLL VENOUS BLD VENIPUNCTURE: CPT

## 2021-10-11 PROCEDURE — 86480 TB TEST CELL IMMUN MEASURE: CPT

## 2021-10-15 LAB
GAMMA INTERFERON BACKGROUND BLD IA-ACNC: 0 IU/ML
M TB IFN-G BLD-IMP: NEGATIVE
M TB IFN-G CD4+ BCKGRND COR BLD-ACNC: 0.09 IU/ML
M TB IFN-G CD4+CD8+ BCKGRND COR BLD-ACNC: 0.06 IU/ML
MITOGEN IGNF BLD-ACNC: >10 IU/ML
QUANTIFERON INCUBATION: NORMAL
SERVICE CMNT-IMP: NORMAL

## 2021-12-08 ENCOUNTER — TRANSCRIBE ORDERS (OUTPATIENT)
Dept: BONE DENSITY | Facility: HOSPITAL | Age: 66
End: 2021-12-08

## 2021-12-08 DIAGNOSIS — Z78.0 MENOPAUSE: Primary | ICD-10-CM

## 2022-03-21 ENCOUNTER — APPOINTMENT (OUTPATIENT)
Dept: BONE DENSITY | Facility: HOSPITAL | Age: 67
End: 2022-03-21

## 2022-03-21 DIAGNOSIS — Z78.0 MENOPAUSE: ICD-10-CM

## 2022-03-21 PROCEDURE — 77080 DXA BONE DENSITY AXIAL: CPT

## 2022-05-13 NOTE — PROGRESS NOTES
Surgery Progress Note   Chief Complaint:  POD 3    Subjective     Interval History:     I am seeing Lisbeth for Dr. Robison.  She has not passed any flatus or moved her bowels since surgery.  She has no nausea but is belching.  She has tolerated her full liquids.        Objective     Vital Signs  Temp:  [97.5 °F (36.4 °C)-98.5 °F (36.9 °C)] 98.5 °F (36.9 °C)  Heart Rate:  [] 96  Resp:  [18] 18  BP: ()/(52-81) 108/64  Body mass index is 34.76 kg/m².    Intake/Output Summary (Last 24 hours) at 2/7/2021 1238  Last data filed at 2/7/2021 1110  Gross per 24 hour   Intake 1200 ml   Output 1385 ml   Net -185 ml     I/O this shift:  In: 360 [P.O.:360]  Out: 500 [Urine:500]       Physical Exam:   General: patient awake, alert and cooperative   Cardiovascular: regular rhythm and rate   Pulm: clear to auscultation bilaterally   Abdomen: soft, hypoactive bowel sounds   Wounds:  Without signs of infection   FAMILIA:  nonbilious drainage   Extremities: no rash or edema   Neurologic: Normal mood and behavior     Results Review:     I reviewed the patient's new clinical results.      WBC No results found for: WBCS   HGB Hemoglobin   Date Value Ref Range Status   02/07/2021 8.1 (L) 12.0 - 15.9 g/dL Final   02/06/2021 8.4 (L) 12.0 - 15.9 g/dL Final   02/05/2021 10.0 (L) 12.0 - 15.9 g/dL Final      HCT Hematocrit   Date Value Ref Range Status   02/07/2021 24.8 (L) 34.0 - 46.6 % Final   02/06/2021 26.4 (L) 34.0 - 46.6 % Final   02/05/2021 31.1 (L) 34.0 - 46.6 % Final      Platlets No results found for: LABPLAT     PT/INR:  No results found for: PROTIME/No results found for: INR    Sodium Sodium   Date Value Ref Range Status   02/07/2021 135 (L) 136 - 145 mmol/L Final   02/06/2021 135 (L) 136 - 145 mmol/L Final   02/05/2021 134 (L) 136 - 145 mmol/L Final   02/04/2021 133 (L) 136 - 145 mmol/L Final      Potassium Potassium   Date Value Ref Range Status   02/07/2021 3.9 3.5 - 5.2 mmol/L Final   02/06/2021 3.9 3.5 - 5.2 mmol/L  Final   02/05/2021 4.8 3.5 - 5.2 mmol/L Final   02/04/2021 4.0 3.5 - 5.2 mmol/L Final      Chloride Chloride   Date Value Ref Range Status   02/07/2021 95 (L) 98 - 107 mmol/L Final   02/06/2021 95 (L) 98 - 107 mmol/L Final   02/05/2021 94 (L) 98 - 107 mmol/L Final   02/04/2021 92 (L) 98 - 107 mmol/L Final      Bicarbonate No results found for: PLASMABICARB   BUN BUN   Date Value Ref Range Status   02/07/2021 19 8 - 23 mg/dL Final   02/06/2021 30 (H) 8 - 23 mg/dL Final   02/05/2021 30 (H) 8 - 23 mg/dL Final   02/04/2021 24 (H) 8 - 23 mg/dL Final      Creatinine Creatinine   Date Value Ref Range Status   02/07/2021 1.03 (H) 0.57 - 1.00 mg/dL Final   02/06/2021 1.31 (H) 0.57 - 1.00 mg/dL Final   02/05/2021 1.83 (H) 0.57 - 1.00 mg/dL Final   02/04/2021 1.45 (H) 0.57 - 1.00 mg/dL Final      Calcium Calcium   Date Value Ref Range Status   02/07/2021 9.0 8.6 - 10.5 mg/dL Final   02/06/2021 8.8 8.6 - 10.5 mg/dL Final   02/05/2021 9.0 8.6 - 10.5 mg/dL Final   02/04/2021 9.3 8.6 - 10.5 mg/dL Final      Magnesium  AST  ALT  Bilirubin, Total  AlkPhos  Albumin    Amylase  Lipase    Radiology: No results found for: MG  No components found for: AST.*  No components found for: ALT.*  No results found for: BILIRUBIN    No components found for: ALKPHOS.*  No components found for: ALBUMIN.*      No components found for: AMYLASE.*  No components found for: LIPASE.*            Imaging Results (Most Recent)     Procedure Component Value Units Date/Time    XR Chest 2 View [[id number]] Collected: 02/03/21 1521     Updated: 02/03/21 1523    Narrative:      CR Chest 2 Vws    INDICATION:    66-year-old female with shortness of air for several weeks.    COMPARISON:    Chest 8/9/2013    FINDINGS:   PA and lateral views of the chest.  Heart and mediastinal contours are normal. The lungs are clear. No pneumothorax or pleural effusion.        Impression:      No acute cardiopulmonary findings.    Signer Name: Sonu Bautista MD   Signed: 2/3/2021  3:21 PM   Workstation Name: [id number]    [facility]                    Assessment/Plan     Patient Active Problem List   Diagnosis Code   • GE (gastroenteritis) K52.9   • Acute gangrenous cholecystitis K81.0       POD 3  --will continue full liquids and give a suppository   --will recheck her liver function tests tomorrow  --her WBC is trending down nicely  --Dr. Robison will be back tomorrow      Melody Gilliam DO  02/07/21  12:38 EST   [Normal] : normal rate, regular rhythm, normal S1 and S2 and no murmur heard [de-identified] : ecchymosis  on bridge of nose

## 2022-07-25 ENCOUNTER — TRANSCRIBE ORDERS (OUTPATIENT)
Dept: ADMINISTRATIVE | Facility: HOSPITAL | Age: 67
End: 2022-07-25

## 2022-07-25 DIAGNOSIS — Z12.31 ENCOUNTER FOR SCREENING MAMMOGRAM FOR MALIGNANT NEOPLASM OF BREAST: Primary | ICD-10-CM

## 2022-08-12 ENCOUNTER — HOSPITAL ENCOUNTER (OUTPATIENT)
Dept: MAMMOGRAPHY | Facility: HOSPITAL | Age: 67
Discharge: HOME | End: 2022-08-12
Admitting: FAMILY MEDICINE

## 2022-08-12 DIAGNOSIS — Z12.31 ENCOUNTER FOR SCREENING MAMMOGRAM FOR MALIGNANT NEOPLASM OF BREAST: ICD-10-CM

## 2022-08-12 PROCEDURE — 77063 BREAST TOMOSYNTHESIS BI: CPT

## 2022-08-12 PROCEDURE — 77067 SCR MAMMO BI INCL CAD: CPT

## 2022-09-09 ENCOUNTER — TRANSCRIBE ORDERS (OUTPATIENT)
Dept: ADMINISTRATIVE | Facility: HOSPITAL | Age: 67
End: 2022-09-09

## 2022-09-09 ENCOUNTER — HOSPITAL ENCOUNTER (OUTPATIENT)
Dept: GENERAL RADIOLOGY | Facility: HOSPITAL | Age: 67
Discharge: HOME | End: 2022-09-09
Admitting: FAMILY MEDICINE

## 2022-09-09 DIAGNOSIS — M79.671 FOOT PAIN, RIGHT: Primary | ICD-10-CM

## 2022-09-09 DIAGNOSIS — M79.671 FOOT PAIN, RIGHT: ICD-10-CM

## 2022-09-09 PROCEDURE — 73630 X-RAY EXAM OF FOOT: CPT

## 2022-12-09 ENCOUNTER — TRANSCRIBE ORDERS (OUTPATIENT)
Dept: ADMINISTRATIVE | Facility: HOSPITAL | Age: 67
End: 2022-12-09

## 2022-12-09 ENCOUNTER — LAB (OUTPATIENT)
Dept: LAB | Facility: HOSPITAL | Age: 67
End: 2022-12-09

## 2022-12-09 DIAGNOSIS — Z79.899 ENCOUNTER FOR LONG-TERM (CURRENT) USE OF OTHER MEDICATIONS: Primary | ICD-10-CM

## 2022-12-09 DIAGNOSIS — Z79.899 ENCOUNTER FOR LONG-TERM (CURRENT) USE OF OTHER MEDICATIONS: ICD-10-CM

## 2022-12-09 LAB
ALBUMIN SERPL-MCNC: 4.8 G/DL (ref 3.5–5.2)
ALBUMIN/GLOB SERPL: 1.7 G/DL
ALP SERPL-CCNC: 126 U/L (ref 39–117)
ALT SERPL W P-5'-P-CCNC: 18 U/L (ref 1–33)
ANION GAP SERPL CALCULATED.3IONS-SCNC: 11.4 MMOL/L (ref 5–15)
AST SERPL-CCNC: 24 U/L (ref 1–32)
BASOPHILS # BLD AUTO: 0.06 10*3/MM3 (ref 0–0.2)
BASOPHILS NFR BLD AUTO: 0.7 % (ref 0–1.5)
BILIRUB SERPL-MCNC: 0.4 MG/DL (ref 0–1.2)
BUN SERPL-MCNC: 34 MG/DL (ref 8–23)
BUN/CREAT SERPL: 27.4 (ref 7–25)
CALCIUM SPEC-SCNC: 10.3 MG/DL (ref 8.6–10.5)
CHLORIDE SERPL-SCNC: 104 MMOL/L (ref 98–107)
CO2 SERPL-SCNC: 24.6 MMOL/L (ref 22–29)
CREAT SERPL-MCNC: 1.24 MG/DL (ref 0.57–1)
DEPRECATED RDW RBC AUTO: 40.9 FL (ref 37–54)
EGFRCR SERPLBLD CKD-EPI 2021: 47.8 ML/MIN/1.73
EOSINOPHIL # BLD AUTO: 0.19 10*3/MM3 (ref 0–0.4)
EOSINOPHIL NFR BLD AUTO: 2.1 % (ref 0.3–6.2)
ERYTHROCYTE [DISTWIDTH] IN BLOOD BY AUTOMATED COUNT: 12.9 % (ref 12.3–15.4)
GLOBULIN UR ELPH-MCNC: 2.9 GM/DL
GLUCOSE SERPL-MCNC: 135 MG/DL (ref 65–99)
HCT VFR BLD AUTO: 36.4 % (ref 34–46.6)
HGB BLD-MCNC: 12.2 G/DL (ref 12–15.9)
IMM GRANULOCYTES # BLD AUTO: 0.06 10*3/MM3 (ref 0–0.05)
IMM GRANULOCYTES NFR BLD AUTO: 0.7 % (ref 0–0.5)
LYMPHOCYTES # BLD AUTO: 2.84 10*3/MM3 (ref 0.7–3.1)
LYMPHOCYTES NFR BLD AUTO: 31.8 % (ref 19.6–45.3)
MCH RBC QN AUTO: 29.1 PG (ref 26.6–33)
MCHC RBC AUTO-ENTMCNC: 33.5 G/DL (ref 31.5–35.7)
MCV RBC AUTO: 86.9 FL (ref 79–97)
MONOCYTES # BLD AUTO: 0.61 10*3/MM3 (ref 0.1–0.9)
MONOCYTES NFR BLD AUTO: 6.8 % (ref 5–12)
NEUTROPHILS NFR BLD AUTO: 5.17 10*3/MM3 (ref 1.7–7)
NEUTROPHILS NFR BLD AUTO: 57.9 % (ref 42.7–76)
NRBC BLD AUTO-RTO: 0 /100 WBC (ref 0–0.2)
PLATELET # BLD AUTO: 289 10*3/MM3 (ref 140–450)
PMV BLD AUTO: 10.6 FL (ref 6–12)
POTASSIUM SERPL-SCNC: 4.6 MMOL/L (ref 3.5–5.2)
PROT SERPL-MCNC: 7.7 G/DL (ref 6–8.5)
RBC # BLD AUTO: 4.19 10*6/MM3 (ref 3.77–5.28)
SODIUM SERPL-SCNC: 140 MMOL/L (ref 136–145)
WBC NRBC COR # BLD: 8.93 10*3/MM3 (ref 3.4–10.8)

## 2022-12-09 PROCEDURE — 36415 COLL VENOUS BLD VENIPUNCTURE: CPT

## 2022-12-09 PROCEDURE — 86480 TB TEST CELL IMMUN MEASURE: CPT

## 2022-12-09 PROCEDURE — 85025 COMPLETE CBC W/AUTO DIFF WBC: CPT

## 2022-12-09 PROCEDURE — 80053 COMPREHEN METABOLIC PANEL: CPT

## 2022-12-11 LAB
GAMMA INTERFERON BACKGROUND BLD IA-ACNC: 0.04 IU/ML
M TB IFN-G BLD-IMP: NEGATIVE
M TB IFN-G CD4+ BCKGRND COR BLD-ACNC: 0.2 IU/ML
M TB IFN-G CD4+CD8+ BCKGRND COR BLD-ACNC: 0.18 IU/ML
MITOGEN IGNF BLD-ACNC: >10 IU/ML
QUANTIFERON INCUBATION: NORMAL
SERVICE CMNT-IMP: NORMAL

## 2023-09-15 ENCOUNTER — TRANSCRIBE ORDERS (OUTPATIENT)
Dept: ADMINISTRATIVE | Facility: HOSPITAL | Age: 68
End: 2023-09-15
Payer: MEDICARE

## 2023-09-15 DIAGNOSIS — Z12.31 ENCOUNTER FOR SCREENING MAMMOGRAM FOR MALIGNANT NEOPLASM OF BREAST: Primary | ICD-10-CM

## 2023-09-28 ENCOUNTER — TRANSCRIBE ORDERS (OUTPATIENT)
Dept: ADMINISTRATIVE | Facility: HOSPITAL | Age: 68
End: 2023-09-28
Payer: MEDICARE

## 2023-09-28 ENCOUNTER — LAB (OUTPATIENT)
Dept: LAB | Facility: HOSPITAL | Age: 68
End: 2023-09-28
Payer: MEDICARE

## 2023-09-28 ENCOUNTER — HOSPITAL ENCOUNTER (OUTPATIENT)
Dept: MAMMOGRAPHY | Facility: HOSPITAL | Age: 68
Discharge: HOME | End: 2023-09-28
Payer: MEDICARE

## 2023-09-28 DIAGNOSIS — Z79.899 ENCOUNTER FOR LONG-TERM (CURRENT) USE OF OTHER MEDICATIONS: ICD-10-CM

## 2023-09-28 DIAGNOSIS — Z12.31 ENCOUNTER FOR SCREENING MAMMOGRAM FOR MALIGNANT NEOPLASM OF BREAST: ICD-10-CM

## 2023-09-28 DIAGNOSIS — L40.0 PSORIASIS VULGARIS: Primary | ICD-10-CM

## 2023-09-28 DIAGNOSIS — L40.0 PSORIASIS VULGARIS: ICD-10-CM

## 2023-09-28 PROCEDURE — 86480 TB TEST CELL IMMUN MEASURE: CPT

## 2023-09-28 PROCEDURE — 77067 SCR MAMMO BI INCL CAD: CPT

## 2023-09-28 PROCEDURE — 77063 BREAST TOMOSYNTHESIS BI: CPT

## 2023-09-28 PROCEDURE — 36415 COLL VENOUS BLD VENIPUNCTURE: CPT

## 2023-09-30 LAB
GAMMA INTERFERON BACKGROUND BLD IA-ACNC: 0.05 IU/ML
M TB IFN-G BLD-IMP: NEGATIVE
M TB IFN-G CD4+ T-CELLS BLD-ACNC: 0.25 IU/ML
M TBIFN-G CD4+ CD8+T-CELLS BLD-ACNC: 0.25 IU/ML
MITOGEN IGNF BLD-ACNC: >10 IU/ML
QUANTIFERON INCUBATION: NORMAL
SERVICE CMNT-IMP: NORMAL

## 2025-03-27 ENCOUNTER — TRANSCRIBE ORDERS (OUTPATIENT)
Dept: ADMINISTRATIVE | Facility: HOSPITAL | Age: 70
End: 2025-03-27
Payer: MEDICARE

## 2025-03-27 DIAGNOSIS — Z12.31 VISIT FOR SCREENING MAMMOGRAM: Primary | ICD-10-CM

## 2025-05-19 ENCOUNTER — HOSPITAL ENCOUNTER (OUTPATIENT)
Dept: MAMMOGRAPHY | Facility: HOSPITAL | Age: 70
Discharge: HOME | End: 2025-05-19
Admitting: FAMILY MEDICINE
Payer: MEDICARE

## 2025-05-19 DIAGNOSIS — Z12.31 VISIT FOR SCREENING MAMMOGRAM: ICD-10-CM

## 2025-05-19 PROCEDURE — 77067 SCR MAMMO BI INCL CAD: CPT

## 2025-05-19 PROCEDURE — 77063 BREAST TOMOSYNTHESIS BI: CPT

## 2025-05-20 PROCEDURE — 77067 SCR MAMMO BI INCL CAD: CPT | Performed by: RADIOLOGY

## 2025-05-20 PROCEDURE — 77063 BREAST TOMOSYNTHESIS BI: CPT | Performed by: RADIOLOGY

## 2025-07-11 ENCOUNTER — HOSPITAL ENCOUNTER (EMERGENCY)
Facility: HOSPITAL | Age: 70
Discharge: HOME | End: 2025-07-11
Attending: STUDENT IN AN ORGANIZED HEALTH CARE EDUCATION/TRAINING PROGRAM
Payer: MEDICARE

## 2025-07-11 VITALS
RESPIRATION RATE: 18 BRPM | OXYGEN SATURATION: 100 % | TEMPERATURE: 98 F | HEART RATE: 58 BPM | DIASTOLIC BLOOD PRESSURE: 80 MMHG | HEIGHT: 64 IN | SYSTOLIC BLOOD PRESSURE: 146 MMHG | BODY MASS INDEX: 27.31 KG/M2 | WEIGHT: 160 LBS

## 2025-07-11 DIAGNOSIS — L74.0 HEAT RASH: Primary | ICD-10-CM

## 2025-07-11 DIAGNOSIS — R21 RASH AND NONSPECIFIC SKIN ERUPTION: ICD-10-CM

## 2025-07-11 PROCEDURE — 99282 EMERGENCY DEPT VISIT SF MDM: CPT | Performed by: STUDENT IN AN ORGANIZED HEALTH CARE EDUCATION/TRAINING PROGRAM

## 2025-07-11 RX ORDER — CEPHALEXIN 500 MG/1
500 CAPSULE ORAL 4 TIMES DAILY
Qty: 20 CAPSULE | Refills: 0 | Status: SHIPPED | OUTPATIENT
Start: 2025-07-11 | End: 2025-07-16

## 2025-07-12 NOTE — ED PROVIDER NOTES
Subjective   History of Present Illness    Chief Complaint   Patient presents with    Rash     LLE redness/rash, denies pain, itching, or fever         Review of Systems   Constitutional:  Negative for fever.   Skin:  Positive for rash.       Past Medical History:   Diagnosis Date    Arthritis     Diabetes mellitus     Diabetes mellitus with nephropathy 02/11/2021    Gout     Hyperlipidemia     Hypertension     Sarcoidosis 02/11/2021       No Known Allergies    Past Surgical History:   Procedure Laterality Date    CHOLECYSTECTOMY WITH INTRAOPERATIVE CHOLANGIOGRAM N/A 02/04/2021    Procedure: CHOLECYSTECTOMY LAPAROSCOPIC converted to open cholecystectomy;  Surgeon: Andie Robison MD;  Location: [facility];  Service: General;  Laterality: N/A;    HERNIA REPAIR      HYSTERECTOMY         Family History   Problem Relation Age of Onset    Breast cancer Neg Hx        Social History     Socioeconomic History    Marital status:    Tobacco Use    Smoking status: Never    Smokeless tobacco: Never   Vaping Use    Vaping status: Never Used   Substance and Sexual Activity    Alcohol use: Never    Drug use: Never    Sexual activity: Defer           Objective   Physical Exam  Vitals and nursing note reviewed.   Constitutional:       Appearance: Normal appearance.   HENT:      Head: Atraumatic.      Right Ear: External ear normal.      Left Ear: External ear normal.      Nose: Nose normal.      Mouth/Throat:      Pharynx: Oropharynx is clear.   Eyes:      Conjunctiva/sclera: Conjunctivae normal.   Cardiovascular:      Rate and Rhythm: Normal rate.   Pulmonary:      Effort: Pulmonary effort is normal. No respiratory distress.   Abdominal:      General: Abdomen is flat. There is no distension.   Musculoskeletal:         General: No swelling.      Cervical back: Neck supple.      Right lower leg: No edema.      Left lower leg: No edema.   Skin:     General: Skin is warm and dry.      Findings: Rash present.              Comments: Patient with superficial skin redness.  Does not appear erythematous, warmth, tender to touch.  No fluctuance. Non blanching.    Neurological:      Mental Status: She is alert and oriented to person, place, and time. Mental status is at baseline.      Gait: Gait normal.   Psychiatric:         Behavior: Behavior normal.               Procedures           ED Course                                                       Medical Decision Making  Problems Addressed:  Heat rash: complicated acute illness or injury  Rash and nonspecific skin eruption: complicated acute illness or injury    Risk  Prescription drug management.    This is a 70yoF, who came to the ER with her daughter for eval for the left lower leg rashes. She said that she was outside this afternoon, felt hot and sweaty. Then went and took a shower. After the shower, she saw the rashes on the left leg. Not itchy. Not warm. Not painful. NO fever. No injury. She came to the ER to see if she needs abx.     On exam, she's well appearing. No acute distress. Normal gait. Exam of the skin shows macular rashes on the left lower leg. Not erythematous. No tenderness. No swelling. Not warm. No discharge. Non blanching.    Assessment: I suspect this is heat rashes, brittanie with the story of being outside, feeling hot and sweaty, and then took a shower and the rashes started to show up.     I doubt contact dermatitis, poison ivy/oak. She's not itchy, no hives. No papules.     I doubt cellulitis, given the timeline of the rash appearance. Also no erythema, tenderness, warmth, swelling.    I doubt skin abscess, necrotizing fasciitis, SSSS, SJS/TEN.     I did advise the patient to take a picture of this rash.  Compare tomorrow to see whether it  grows  larger. I suspect that if it was heat rashes, it should improve or at least stay the same. If it's an early skin infection today, it'd get worse without treatment.  I then sent the prescription for Keflex for her in case  this got worse.    I told her about home care instructions, return precautions.  She voices understanding and agreement to this plan.  Discharged in stable condition.  Ambulatory with daughter.      Please note that portions of this note were completed with a voice recognition program.         Final diagnoses:   Heat rash   Rash and nonspecific skin eruption       ED Disposition  ED Disposition       ED Disposition   Discharge    Condition   Stable    Comment   --               Hazel Fan MD  [address]  [address]  [facility] [address] [phone]    In 3 days      [facility] EMERGENCY DEPARTMENT  [address]  [place] [facility] [facility] [address] [phone]    If symptoms worsen         Medication List        New Prescriptions      cephalexin 500 MG capsule  Commonly known as: KEFLEX  Take 1 capsule by mouth 4 (Four) Times a Day for 5 days.               Where to Get Your Medications        These medications were sent to [facility]/pharmacy #[id number] - [place], [place] - [address] - [phone]  - [phone]   [address] [address]      Phone: [phone]   cephalexin 500 MG capsule            Jose Luis Kemp MD  07/12/25 8721

## 2025-07-12 NOTE — DISCHARGE INSTRUCTIONS
Thank you for your visit to the Emergency Department.     It was a pleasure to take care of you in the emergency room today.     Today you were seen for skin rashes on the left leg. We performed a thorough history and physical exam.  We suspect that you had heat rashes.  This does not appear to be cellulitis or skin infection.  However, if in the next 1 to 2 days, if your skin redness does not improve and got worse, we prescribed the Keflex for you to take to treat skin infection.      Please return to the nearest ED or call 911 if you experience:    Worsening symptoms, severe pain, warm to touch to the area of rashes, pus, green or yellow discharge, severe pain, difficulty breathing, chest pain, fever that doesn't break with Tylenol or Motrin, uncontrolled vomiting or diarrhea that doesn't stop, passing out, lethargy (drowsiness, hard to wake up), numbness/tingling/weakness on one side, speech difficulty, cannot walk, or any concerns for limb/life threatening issues.    The Emergency Department is open 24 hours a day.     Please follow up with: your primary care doctor within 1-3 days.    In the meantime, please:  Take acetaminophen 650mg every 6-8 hours on a full stomach as needed for pain or fever.   Continue to take any other existing medications as prescribed.

## (undated) DEVICE — SPNG DISECTOR KTNER XRAY COTN 1/4X9/16IN PK/5

## (undated) DEVICE — GOWN ,SIRUS,NONREINFORCED SMALL: Brand: MEDLINE

## (undated) DEVICE — Device: Brand: MEDEX

## (undated) DEVICE — TBG INSUFL W FLTR STRL

## (undated) DEVICE — ENDOPATH XCEL BLADELESS TROCARS WITH STABILITY SLEEVES: Brand: ENDOPATH XCEL

## (undated) DEVICE — CATH CHOLANG 4.5F18IN BRGNDY

## (undated) DEVICE — SKIN AFFIX SURG ADHESIVE 72/CS 0.55ML: Brand: MEDLINE

## (undated) DEVICE — DRP C/ARM 41X74IN

## (undated) DEVICE — PENCL E/S ULTRAVAC TELESCP NOSE HOLSTR 10FT

## (undated) DEVICE — SUT PDS 1 TP1 48IN Z880G BX/12

## (undated) DEVICE — ENDOPATH PNEUMONEEDLE INSUFFLATION NEEDLES WITH LUER LOCK CONNECTORS 120MM: Brand: ENDOPATH

## (undated) DEVICE — PROXIMATE RH ROTATING HEAD SKIN STAPLERS (35 WIDE) CONTAINS 35 STAINLESS STEEL STAPLES: Brand: PROXIMATE

## (undated) DEVICE — GLV SURG SENSICARE ORTHO PF LF 6.5 STRL

## (undated) DEVICE — DECANT BG O JET

## (undated) DEVICE — RESERVOIR,SUCTION,100CC,SILICONE: Brand: MEDLINE

## (undated) DEVICE — TRAP FLD MINIVAC MEGADYNE 100ML

## (undated) DEVICE — ENDOPATH XCEL UNIVERSAL TROCAR STABLILITY SLEEVES: Brand: ENDOPATH XCEL

## (undated) DEVICE — SYR LUERLOK 30CC

## (undated) DEVICE — ST EXT IV TBG W SECUR LK 20IN

## (undated) DEVICE — LAPAROSCOPIC SMOKE FILTRATION SYSTEM: Brand: PALL LAPAROSHIELD® PLUS LAPAROSCOPIC SMOKE FILTRATION SYSTEM

## (undated) DEVICE — SOL ANTISEP SCRB PVPI 7.5PCT 4OZ

## (undated) DEVICE — APPL CHLORAPREP HI/LITE 26ML ORNG

## (undated) DEVICE — DECANTER: Brand: UNBRANDED

## (undated) DEVICE — SPNG LAP 18X18IN LF STRL PK/5

## (undated) DEVICE — PK LAP GEN 90

## (undated) DEVICE — SYR LUERLOK 20CC BX/50

## (undated) DEVICE — UNDYED BRAIDED (POLYGLACTIN 910), SYNTHETIC ABSORBABLE SUTURE: Brand: COATED VICRYL

## (undated) DEVICE — CATH IV INSYTE AUTOGARD 14G 1 1/2IN ORNG

## (undated) DEVICE — Device

## (undated) DEVICE — ENDOCUT SCISSOR TIP, DISPOSABLE: Brand: RENEW

## (undated) DEVICE — CONTAINER,SPECIMEN,OR STERILE,4OZ: Brand: MEDLINE

## (undated) DEVICE — SUT SILK 2/0 FS BLK 18IN 685G

## (undated) DEVICE — SYR LUERLOK 50ML

## (undated) DEVICE — SUT SILK 0 TIES 30IN A306H

## (undated) DEVICE — PATIENT RETURN ELECTRODE, SINGLE-USE, CONTACT QUALITY MONITORING, ADULT, WITH 9FT CORD, FOR PATIENTS WEIGING OVER 33LBS. (15KG): Brand: MEGADYNE

## (undated) DEVICE — SUT VIC 0 TN 27IN DYED JTN0G

## (undated) DEVICE — APPL HEMOS FOR DELIVERY FLOSEAL

## (undated) DEVICE — 2, DISPOSABLE SUCTION/IRRIGATOR WITH DISPOSABLE TIP: Brand: STRYKEFLOW

## (undated) DEVICE — SUT VIC 3/0 SH 27IN J416H